# Patient Record
Sex: MALE | Race: WHITE | NOT HISPANIC OR LATINO | Employment: OTHER | URBAN - NONMETROPOLITAN AREA
[De-identification: names, ages, dates, MRNs, and addresses within clinical notes are randomized per-mention and may not be internally consistent; named-entity substitution may affect disease eponyms.]

---

## 2017-09-28 ENCOUNTER — OFFICE VISIT (OUTPATIENT)
Dept: CARDIOLOGY | Facility: CLINIC | Age: 72
End: 2017-09-28

## 2017-09-28 VITALS
OXYGEN SATURATION: 93 % | BODY MASS INDEX: 29.15 KG/M2 | HEART RATE: 73 BPM | SYSTOLIC BLOOD PRESSURE: 102 MMHG | DIASTOLIC BLOOD PRESSURE: 63 MMHG | WEIGHT: 203.6 LBS | HEIGHT: 70 IN

## 2017-09-28 DIAGNOSIS — R60.9 PERIPHERAL EDEMA: ICD-10-CM

## 2017-09-28 DIAGNOSIS — R06.02 SHORTNESS OF BREATH: ICD-10-CM

## 2017-09-28 DIAGNOSIS — I50.22 CHRONIC SYSTOLIC CONGESTIVE HEART FAILURE (HCC): Primary | ICD-10-CM

## 2017-09-28 DIAGNOSIS — E78.5 HYPERLIPIDEMIA, UNSPECIFIED HYPERLIPIDEMIA TYPE: ICD-10-CM

## 2017-09-28 PROCEDURE — 93000 ELECTROCARDIOGRAM COMPLETE: CPT | Performed by: NURSE PRACTITIONER

## 2017-09-28 PROCEDURE — 99213 OFFICE O/P EST LOW 20 MIN: CPT | Performed by: NURSE PRACTITIONER

## 2017-09-28 NOTE — PROGRESS NOTES
Subjective   Rao Alcazar is a 72 y.o. male     Chief Complaint   Patient presents with   • Congestive Heart Failure     patient presents for a 1 year follow up       HPI    Problem list:    1.  Dyslipidemia  2.  CHF  3.  History of cardiac arrest  4.  Cardiomegaly  5.  History of seizures  6.  Mental retardation  7.  Echo 4/19/16-EF 55-60%, diastolic dysfunction 1, one and half centimeter echodense mass superimposed on the superior aspect of the LAD seen only in the apical views.  Trace MR, recommend SERENA or cardiac MRI to further evaluate the LA findings.  7.1  SERENA 9/8/16-no evidence of intracardiac mass trivial MR and TR  8.  Stress test 4/19/1/6-low risk, no ischemia    Patient is a 72-year-old male who presents today for follow-up with his 2 caregivers at his side.  Questions are answered based on caregivers as patient responds yes to everything per the report.  He has no chest pain, pressure, palpitations, fluttering, dizziness, presyncope, syncope, orthopnea, PND or edema.  He does not complain of any shortness of breath with activity and had not noticed him labored in his activity.  His primary care doctor at [facility] which is Dr. Garcia monitors his blood work.      Current Outpatient Prescriptions   Medication Sig Dispense Refill   • acetaminophen (PAIN RELIEVER) 325 MG tablet Take 1-2 tablets by mouth every 4 (four) hours as needed.     • alendronate (FOSAMAX) 70 MG tablet Take 1 tablet by mouth 1 (one) time per week.     • aspirin 81 MG tablet Take 1 tablet by mouth daily.     • Calcium Carb-Cholecalciferol (CALCIUM 600 + D) 600-200 MG-UNIT tablet Take 1 tablet by mouth 2 (two) times a day.     • clonazePAM (KLONOPIN) 0.5 MG tablet Take 0.5 tablets by mouth 3 (three) times a day.     • levETIRAcetam (KEPPRA) 500 MG tablet Take 4 tablets by mouth 2 (two) times a day.     • levothyroxine (SYNTHROID) 25 MCG tablet Take 1 tablet by mouth daily.     • Multiple Vitamin (THERA) tablet Take 1 tablet by mouth  daily.     • phenytoin (DILANTIN) 100 MG ER capsule Take 2 capsules by mouth 2 (two) times a day.     • polyethylene glycol (MIRALAX) powder Take  by mouth daily. Mix 1 capful in 8 ounces of water and drink daily     • ranitidine (ZANTAC) 300 MG tablet Take 1 tablet by mouth 2 (two) times a day.     • Sennosides (SENNA) 8.6 MG capsule Take 1 capsule by mouth 2 (Two) Times a Day.     • simvastatin (ZOCOR) 20 MG tablet Take 1 tablet by mouth daily.     • tamsulosin (FLOMAX) 0.4 MG capsule 24 hr capsule Take 1 capsule by mouth daily.       No current facility-administered medications for this visit.        ALLERGIES    Review of patient's allergies indicates no known allergies.    Past Medical History:   Diagnosis Date   • Atypical chest pain    • Cardiomegaly    • CHF (congestive heart failure)    • History of panic attacks    • History of seizure disorder    • Mental disorder    • Mental retardation    • Panic attacks    • Seizures        Social History     Social History   • Marital status: Single     Spouse name: N/A   • Number of children: N/A   • Years of education: N/A     Occupational History   • Not on file.     Social History Main Topics   • Smoking status: Never Smoker   • Smokeless tobacco: Never Used   • Alcohol use No   • Drug use: No   • Sexual activity: Not on file     Other Topics Concern   • Not on file     Social History Narrative       Family History   Problem Relation Age of Onset   • Family history unknown: Yes       Review of Systems   Constitutional: Negative for diaphoresis and fatigue.   HENT: Negative for rhinorrhea and sneezing.    Eyes: Negative for visual disturbance.   Respiratory: Negative for chest tightness and shortness of breath.    Cardiovascular: Negative for chest pain, palpitations and leg swelling.   Gastrointestinal: Negative for nausea and vomiting.        Dysphagia    Endocrine: Negative.    Genitourinary: Positive for difficulty urinating (incontinent ).   Musculoskeletal:  "Positive for back pain. Negative for arthralgias and neck pain.   Skin: Negative.    Allergic/Immunologic: Negative for environmental allergies.   Neurological: Positive for seizures (H/O seizures, last 2-3 mos 15-20 sec only  ). Negative for dizziness, syncope and light-headedness.   Hematological: Negative.    Psychiatric/Behavioral: The patient is nervous/anxious.         Mentally retarded        Objective   /63 (BP Location: Left arm, Patient Position: Sitting)  Pulse 73  Ht 70\" (177.8 cm)  Wt 203 lb 9.6 oz (92.4 kg)  SpO2 93%  BMI 29.21 kg/m2  Vitals:    09/28/17 0812   BP: 102/63   BP Location: Left arm   Patient Position: Sitting   Pulse: 73   SpO2: 93%   Weight: 203 lb 9.6 oz (92.4 kg)   Height: 70\" (177.8 cm)      Lab Results (most recent)     None        Physical Exam   Constitutional: Vital signs are normal. He appears well-developed and well-nourished. He is active and cooperative.   HENT:   Head: Normocephalic.   Mouth/Throat: Abnormal dentition (edentulous ).   Eyes: Lids are normal.   Neck: Normal carotid pulses, no hepatojugular reflux and no JVD present. Carotid bruit is not present.   Cardiovascular: Normal rate, regular rhythm and normal heart sounds.    Pulses:       Radial pulses are 2+ on the right side, and 2+ on the left side.        Dorsalis pedis pulses are 2+ on the right side, and 2+ on the left side.        Posterior tibial pulses are 2+ on the right side, and 2+ on the left side.   Trace edema BLE   Pulmonary/Chest: Effort normal and breath sounds normal.   Abdominal: Normal appearance and bowel sounds are normal.   Neurological: He is alert.   Patient speaks very little unable to answer questions    Skin: Skin is warm, dry and intact.   Psychiatric: He is slowed.   Flat; not very social        Procedure     ECG 12 Lead  Date/Time: 9/28/2017 8:11 AM  Performed by: MICHELLE RODRIGEZ  Authorized by: MICHELLE RODRIGEZ   Comparison: compared with previous ECG from 3/16/2016  Similar " to previous ECG  Rhythm: sinus rhythm  Rate: normal  BPM: 70  T wave flattening noted on lead: nonspecific changes.  QRS axis: normal  Clinical impression: non-specific ECG  Comments: chf                   Assessment/Plan      Diagnosis Plan   1. Chronic systolic congestive heart failure  ECG 12 Lead   2. Hyperlipidemia, unspecified hyperlipidemia type     3. Shortness of breath     4. Peripheral edema  Compression Knee High Stockings       Return in about 1 year (around 9/28/2018).       CHF-patient doing well.  Hyperlipidemia-patient is on Zocor monitor by PCP.  Shortness of breath-no complaints.  Peripheral edema-ordered compression knee socks.  He will continue his medication regimen.  He will follow-up in one year or sooner if any changes.

## 2017-09-28 NOTE — PATIENT INSTRUCTIONS
Edema  Edema is an abnormal buildup of fluids in your body tissues. Edema is somewhat dependent on gravity to pull the fluid to the lowest place in your body. That makes the condition more common in the legs and thighs (lower extremities). Painless swelling of the feet and ankles is common and becomes more likely as you get older. It is also common in looser tissues, like around your eyes.   When the affected area is squeezed, the fluid may move out of that spot and leave a dent for a few moments. This dent is called pitting.   CAUSES   There are many possible causes of edema. Eating too much salt and being on your feet or sitting for a long time can cause edema in your legs and ankles. Hot weather may make edema worse. Common medical causes of edema include:  · Heart failure.  · Liver disease.  · Kidney disease.  · Weak blood vessels in your legs.  · Cancer.  · An injury.  · Pregnancy.  · Some medications.  · Obesity.   SYMPTOMS   Edema is usually painless. Your skin may look swollen or shiny.   DIAGNOSIS   Your health care provider may be able to diagnose edema by asking about your medical history and doing a physical exam. You may need to have tests such as X-rays, an electrocardiogram, or blood tests to check for medical conditions that may cause edema.   TREATMENT   Edema treatment depends on the cause. If you have heart, liver, or kidney disease, you need the treatment appropriate for these conditions. General treatment may include:  · Elevation of the affected body part above the level of your heart.  · Compression of the affected body part. Pressure from elastic bandages or support stockings squeezes the tissues and forces fluid back into the blood vessels. This keeps fluid from entering the tissues.  · Restriction of fluid and salt intake.  · Use of a water pill (diuretic). These medications are appropriate only for some types of edema. They pull fluid out of your body and make you urinate more often. This  gets rid of fluid and reduces swelling, but diuretics can have side effects. Only use diuretics as directed by your health care provider.  HOME CARE INSTRUCTIONS   · Keep the affected body part above the level of your heart when you are lying down.    · Do not sit still or stand for prolonged periods.    · Do not put anything directly under your knees when lying down.  · Do not wear constricting clothing or garters on your upper legs.    · Exercise your legs to work the fluid back into your blood vessels. This may help the swelling go down.    · Wear elastic bandages or support stockings to reduce ankle swelling as directed by your health care provider.    · Eat a low-salt diet to reduce fluid if your health care provider recommends it.    · Only take medicines as directed by your health care provider.   SEEK MEDICAL CARE IF:   · Your edema is not responding to treatment.  · You have heart, liver, or kidney disease and notice symptoms of edema.  · You have edema in your legs that does not improve after elevating them.    · You have sudden and unexplained weight gain.  SEEK IMMEDIATE MEDICAL CARE IF:   · You develop shortness of breath or chest pain.    · You cannot breathe when you lie down.  · You develop pain, redness, or warmth in the swollen areas.    · You have heart, liver, or kidney disease and suddenly get edema.  · You have a fever and your symptoms suddenly get worse.  MAKE SURE YOU:   · Understand these instructions.  · Will watch your condition.  · Will get help right away if you are not doing well or get worse.     This information is not intended to replace advice given to you by your health care provider. Make sure you discuss any questions you have with your health care provider.     Document Released: 12/18/2006 Document Revised: 04/10/2017 Document Reviewed: 10/10/2014  [employer] Interactive Patient Education ©2017 [employer]

## 2018-09-26 ENCOUNTER — OFFICE VISIT (OUTPATIENT)
Dept: CARDIOLOGY | Facility: CLINIC | Age: 73
End: 2018-09-26

## 2018-09-26 VITALS
BODY MASS INDEX: 27.83 KG/M2 | HEART RATE: 81 BPM | WEIGHT: 194.4 LBS | OXYGEN SATURATION: 94 % | HEIGHT: 70 IN | SYSTOLIC BLOOD PRESSURE: 102 MMHG | DIASTOLIC BLOOD PRESSURE: 63 MMHG

## 2018-09-26 DIAGNOSIS — I51.7 CARDIOMEGALY: ICD-10-CM

## 2018-09-26 DIAGNOSIS — R60.9 PERIPHERAL EDEMA: ICD-10-CM

## 2018-09-26 DIAGNOSIS — R06.02 SHORTNESS OF BREATH: ICD-10-CM

## 2018-09-26 DIAGNOSIS — E78.5 HYPERLIPIDEMIA, UNSPECIFIED HYPERLIPIDEMIA TYPE: Primary | ICD-10-CM

## 2018-09-26 PROCEDURE — 99213 OFFICE O/P EST LOW 20 MIN: CPT | Performed by: NURSE PRACTITIONER

## 2018-09-26 PROCEDURE — 93000 ELECTROCARDIOGRAM COMPLETE: CPT | Performed by: NURSE PRACTITIONER

## 2018-09-26 NOTE — PATIENT INSTRUCTIONS
Obesity, Adult  Obesity is the condition of having too much total body fat. Being overweight or obese means that your weight is greater than what is considered healthy for your body size. Obesity is determined by a measurement called BMI. BMI is an estimate of body fat and is calculated from height and weight. For adults, a BMI of 30 or higher is considered obese.  Obesity can eventually lead to other health concerns and major illnesses, including:  · Stroke.  · Coronary artery disease (CAD).  · Type 2 diabetes.  · Some types of cancer, including cancers of the colon, breast, uterus, and gallbladder.  · Osteoarthritis.  · High blood pressure (hypertension).  · High cholesterol.  · Sleep apnea.  · Gallbladder stones.  · Infertility problems.    What are the causes?  The main cause of obesity is taking in (consuming) more calories than your body uses for energy. Other factors that contribute to this condition may include:  · Being born with genes that make you more likely to become obese.  · Having a medical condition that causes obesity. These conditions include:  ? Hypothyroidism.  ? Polycystic ovarian syndrome (PCOS).  ? Binge-eating disorder.  ? Cushing syndrome.  · Taking certain medicines, such as steroids, antidepressants, and seizure medicines.  · Not being physically active (sedentary lifestyle).  · Living where there are limited places to exercise safely or buy healthy foods.  · Not getting enough sleep.    What increases the risk?  The following factors may increase your risk of this condition:  · Having a family history of obesity.  · Being a woman of -American descent.  · Being a man of  descent.    What are the signs or symptoms?  Having excessive body fat is the main symptom of this condition.  How is this diagnosed?  This condition may be diagnosed based on:  · Your symptoms.  · Your medical history.  · A physical exam. Your health care provider may measure:  ? Your BMI. If you are an  adult with a BMI between 25 and less than 30, you are considered overweight. If you are an adult with a BMI of 30 or higher, you are considered obese.  ? The distances around your hips and your waist (circumferences). These may be compared to each other to help diagnose your condition.  ? Your skinfold thickness. Your health care provider may gently pinch a fold of your skin and measure it.    How is this treated?  Treatment for this condition often includes changing your lifestyle. Treatment may include some or all of the following:  · Dietary changes. Work with your health care provider and a dietitian to set a weight-loss goal that is healthy and reasonable for you. Dietary changes may include eating:  ? Smaller portions. A portion size is the amount of a particular food that is healthy for you to eat at one time. This varies from person to person.  ? Low-calorie or low-fat options.  ? More whole grains, fruits, and vegetables.  · Regular physical activity. This may include aerobic activity (cardio) and strength training.  · Medicine to help you lose weight. Your health care provider may prescribe medicine if you are unable to lose 1 pound a week after 6 weeks of eating more healthily and doing more physical activity.  · Surgery. Surgical options may include gastric banding and gastric bypass. Surgery may be done if:  ? Other treatments have not helped to improve your condition.  ? You have a BMI of 40 or higher.  ? You have life-threatening health problems related to obesity.    Follow these instructions at home:    Eating and drinking    · Follow recommendations from your health care provider about what you eat and drink. Your health care provider may advise you to:  ? Limit fast foods, sweets, and processed snack foods.  ? Choose low-fat options, such as low-fat milk instead of whole milk.  ? Eat 5 or more servings of fruits or vegetables every day.  ? Eat at home more often. This gives you more control over  what you eat.  ? Choose healthy foods when you eat out.  ? Learn what a healthy portion size is.  ? Keep low-fat snacks on hand.  ? Avoid sugary drinks, such as soda, fruit juice, iced tea sweetened with sugar, and flavored milk.  ? Eat a healthy breakfast.  · Drink enough water to keep your urine clear or pale yellow.  · Do not go without eating for long periods of time (do not fast) or follow a fad diet. Fasting and fad diets can be unhealthy and even dangerous.  Physical Activity  · Exercise regularly, as told by your health care provider. Ask your health care provider what types of exercise are safe for you and how often you should exercise.  · Warm up and stretch before being active.  · Cool down and stretch after being active.  · Rest between periods of activity.  Lifestyle  · Limit the time that you spend in front of your TV, computer, or video game system.  · Find ways to reward yourself that do not involve food.  · Limit alcohol intake to no more than 1 drink a day for nonpregnant women and 2 drinks a day for men. One drink equals 12 oz of beer, 5 oz of wine, or 1½ oz of hard liquor.  General instructions  · Keep a weight loss journal to keep track of the food you eat and how much you exercise you get.  · Take over-the-counter and prescription medicines only as told by your health care provider.  · Take vitamins and supplements only as told by your health care provider.  · Consider joining a support group. Your health care provider may be able to recommend a support group.  · Keep all follow-up visits as told by your health care provider. This is important.  Contact a health care provider if:  · You are unable to meet your weight loss goal after 6 weeks of dietary and lifestyle changes.  This information is not intended to replace advice given to you by your health care provider. Make sure you discuss any questions you have with your health care provider.  Document Released: 01/25/2006 Document Revised:  05/22/2017 Document Reviewed: 10/05/2016  [employer] Interactive Patient Education © 2018 [employer]  MyPlate from [employer]  The general, healthful diet is based on the 2010 Dietary Guidelines for Americans. The amount of food you need to eat from each food group depends on your age, sex, and level of physical activity and can be individualized by a dietitian. Go to ChooseMyPlate.gov for more information.  What do I need to know about the MyPlate plan?  · Enjoy your food, but eat less.  · Avoid oversized portions.  ? ½ of your plate should include fruits and vegetables.  ? ¼ of your plate should be grains.  ? ¼ of your plate should be protein.  Grains  · Make at least half of your grains whole grains.  · For a 2,000 calorie daily food plan, eat 6 oz every day.  · 1 oz is about 1 slice bread, 1 cup cereal, or ½ cup cooked rice, cereal, or pasta.  Vegetables  · Make half your plate fruits and vegetables.  · For a 2,000 calorie daily food plan, eat 2½ cups every day.  · 1 cup is about 1 cup raw or cooked vegetables or vegetable juice or 2 cups raw leafy greens.  Fruits  · Make half your plate fruits and vegetables.  · For a 2,000 calorie daily food plan, eat 2 cups every day.  · 1 cup is about 1 cup fruit or 100% fruit juice or ½ cup dried fruit.  Protein  · For a 2,000 calorie daily food plan, eat 5½ oz every day.  · 1 oz is about 1 oz meat, poultry, or fish, ¼ cup cooked beans, 1 egg, 1 Tbsp peanut butter, or ½ oz nuts or seeds.  Dairy  · Switch to fat-free or low-fat (1%) milk.  · For a 2,000 calorie daily food plan, eat 3 cups every day.  · 1 cup is about 1 cup milk or yogurt or soy milk (soy beverage), 1½ oz natural cheese, or 2 oz processed cheese.  Fats, Oils, and Empty Calories  · Only small amounts of oils are recommended.  · Empty calories are calories from solid fats or added sugars.  · Compare sodium in foods like soup, bread, and frozen meals. Choose the foods with lower numbers.  · Drink water instead of  sugary drinks.  What foods can I eat?  Grains  Whole grains such as whole wheat, quinoa, millet, and bulgur. Bread, rolls, and pasta made from whole grains. Brown or wild rice. Hot or cold cereals made from whole grains and without added sugar.  Vegetables  All fresh vegetables, especially fresh red, dark green, or orange vegetables. Peas and beans. Low-sodium frozen or canned vegetables prepared without added salt. Low-sodium vegetable juices.  Fruits  All fresh, frozen, and dried fruits. Canned fruit packed in water or fruit juice without added sugar. Fruit juices without added sugar.  Meats and Other Protein Sources  Boiled, baked, or grilled lean meat trimmed of fat. Skinless poultry. Fresh seafood and shellfish. Canned seafood packed in water. Unsalted nuts and unsalted nut butters. Tofu. Dried beans and pea. Eggs.  Dairy  Low-fat or fat-free milk, yogurt, and cheeses.  Sweets and Desserts  Frozen desserts made from low-fat milk.  Fats and Oils  Olive, peanut, and canola oils and margarine. Salad dressing and mayonnaise made from these oils.  Other  Soups and casseroles made from allowed ingredients and without added fat or salt.  The items listed above may not be a complete list of recommended foods or beverages. Contact your dietitian for more options.  What foods are not recommended?  Grains  Sweetened, low-fiber cereals. Packaged baked goods. Snack crackers and chips. Cheese crackers, butter crackers, and biscuits. Frozen waffles, sweet breads, doughnuts, pastries, packaged baking mixes, pancakes, cakes, and cookies.  Vegetables  Regular canned or frozen vegetables or vegetables prepared with salt. Canned tomatoes. Canned tomato sauce. Fried vegetables. Vegetables in cream sauce or cheese sauce.  Fruits  Fruits packed in syrup or made with added sugar.  Meats and Other Protein Sources  Marbled or fatty meats such as ribs. Poultry with skin. Fried meats, poultry, eggs, or fish. Sausages, hot dogs, and deli  meats such as pastrami, bologna, or salami.  Dairy  Whole milk, cream, cheeses made from whole milk, sour cream. Ice cream or yogurt made from whole milk or with added sugar.  Beverages  For adults, no more than one alcoholic drink per day. Regular soft drinks or other sugary beverages. Juice drinks.  Sweets and Desserts  Sugary or fatty desserts, candy, and other sweets.  Fats and Oils  Solid shortening or partially hydrogenated oils. Solid margarine. Margarine that contains trans fats. Butter.  The items listed above may not be a complete list of foods and beverages to avoid. Contact your dietitian for more information.  This information is not intended to replace advice given to you by your health care provider. Make sure you discuss any questions you have with your health care provider.  Document Released: 01/06/2009 Document Revised: 05/25/2017 Document Reviewed: 11/26/2014  [employer] Interactive Patient Education © 2018 [employer]
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
## 2019-09-26 ENCOUNTER — OFFICE VISIT (OUTPATIENT)
Dept: CARDIOLOGY | Facility: CLINIC | Age: 74
End: 2019-09-26

## 2019-09-26 VITALS
BODY MASS INDEX: 29.58 KG/M2 | WEIGHT: 195.2 LBS | HEART RATE: 77 BPM | HEIGHT: 68 IN | DIASTOLIC BLOOD PRESSURE: 78 MMHG | SYSTOLIC BLOOD PRESSURE: 130 MMHG | OXYGEN SATURATION: 95 %

## 2019-09-26 DIAGNOSIS — R06.02 SHORTNESS OF BREATH: ICD-10-CM

## 2019-09-26 DIAGNOSIS — I51.7 CARDIOMEGALY: Primary | ICD-10-CM

## 2019-09-26 DIAGNOSIS — E78.5 HYPERLIPIDEMIA, UNSPECIFIED HYPERLIPIDEMIA TYPE: ICD-10-CM

## 2019-09-26 DIAGNOSIS — R60.9 PERIPHERAL EDEMA: ICD-10-CM

## 2019-09-26 PROCEDURE — 93000 ELECTROCARDIOGRAM COMPLETE: CPT | Performed by: NURSE PRACTITIONER

## 2019-09-26 PROCEDURE — 99213 OFFICE O/P EST LOW 20 MIN: CPT | Performed by: NURSE PRACTITIONER

## 2019-09-26 RX ORDER — LOPERAMIDE HYDROCHLORIDE 2 MG/1
2 CAPSULE ORAL 4 TIMES DAILY PRN
Status: ON HOLD | COMMUNITY
End: 2021-09-24

## 2019-09-26 RX ORDER — PETROLATUM 42 G/100G
OINTMENT TOPICAL EVERY 12 HOURS SCHEDULED
Status: ON HOLD | COMMUNITY
End: 2021-09-24

## 2019-09-26 RX ORDER — AMOXICILLIN 250 MG
1 CAPSULE ORAL DAILY
Status: ON HOLD | COMMUNITY
End: 2021-09-24

## 2019-09-26 NOTE — PATIENT INSTRUCTIONS
"Fat and Cholesterol Restricted Eating Plan  Getting too much fat and cholesterol in your diet may cause health problems. Choosing the right foods helps keep your fat and cholesterol at normal levels. This can keep you from getting certain diseases.  Your doctor may recommend an eating plan that includes:  · Total fat: ______% or less of total calories a day.  · Saturated fat: ______% or less of total calories a day.  · Cholesterol: less than _________mg a day.  · Fiber: ______g a day.  What are tips for following this plan?  General tips    · Work with your doctor to lose weight if you need to.  · Avoid:  ? Foods with added sugar.  ? Fried foods.  ? Foods with partially hydrogenated oils.  · Limit alcohol intake to no more than 1 drink a day for nonpregnant women and 2 drinks a day for men. One drink equals 12 oz of beer, 5 oz of wine, or 1½ oz of hard liquor.  Reading food labels  · Check food labels for:  ? Trans fats.  ? Partially hydrogenated oils.  ? Saturated fat (g) in each serving.  ? Cholesterol (mg) in each serving.  ? Fiber (g) in each serving.  · Choose foods with healthy fats, such as:  ? Monounsaturated fats.  ? Polyunsaturated fats.  ? Omega-3 fats.  · Choose grain products that have whole grains. Look for the word \"whole\" as the first word in the ingredient list.  Cooking  · Cook foods using low-fat methods. These include baking, boiling, grilling, and broiling.  · Eat more home-cooked foods. Eat at restaurants and buffets less often.  · Avoid cooking using saturated fats, such as butter, cream, palm oil, palm kernel oil, and coconut oil.  Meal planning    · At meals, divide your plate into four equal parts:  ? Fill one-half of your plate with vegetables and green salads.  ? Fill one-fourth of your plate with whole grains.  ? Fill one-fourth of your plate with low-fat (lean) protein foods.  · Eat fish that is high in omega-3 fats at least two times a week. This includes mackerel, tuna, sardines, and " salmon.  · Eat foods that are high in fiber, such as whole grains, beans, apples, broccoli, carrots, peas, and barley.  Recommended foods  Grains  · Whole grains, such as whole wheat or whole grain breads, crackers, cereals, and pasta. Unsweetened oatmeal, bulgur, barley, quinoa, or brown rice. Corn or whole wheat flour tortillas.  Vegetables  · Fresh or frozen vegetables (raw, steamed, roasted, or grilled). Green salads.  Fruits  · All fresh, canned (in natural juice), or frozen fruits.  Meats and other protein foods  · Ground beef (85% or leaner), grass-fed beef, or beef trimmed of fat. Skinless chicken or turkey. Ground chicken or turkey. Pork trimmed of fat. All fish and seafood. Egg whites. Dried beans, peas, or lentils. Unsalted nuts or seeds. Unsalted canned beans. Nut butters without added sugar or oil.  Dairy  · Low-fat or nonfat dairy products, such as skim or 1% milk, 2% or reduced-fat cheeses, low-fat and fat-free ricotta or cottage cheese, or plain low-fat and nonfat yogurt.  Fats and oils  · Tub margarine without trans fats. Light or reduced-fat mayonnaise and salad dressings. Avocado. Olive, canola, sesame, or safflower oils.  The items listed above may not be a complete list of recommended foods or beverages. Contact your dietitian for more options.  The items listed above may not be a complete list of foods and beverages [you/your child] can eat. Contact a dietitian for more information.  Foods to avoid  Grains  · White bread. White pasta. White rice. Cornbread. Bagels, pastries, and croissants. Crackers and snack foods that contain trans fat and hydrogenated oils.  Vegetables  · Vegetables cooked in cheese, cream, or butter sauce. Fried vegetables.  Fruits  · Canned fruit in heavy syrup. Fruit in cream or butter sauce. Fried fruit.  Meats and other protein foods  · Fatty cuts of meat. Ribs, chicken wings, alford, sausage, bologna, salami, chitterlings, fatback, hot dogs, bratwurst, and packaged  lunch meats. Liver and organ meats. Whole eggs and egg yolks. Chicken and turkey with skin. Fried meat.  Dairy  · Whole or 2% milk, cream, half-and-half, and cream cheese. Whole milk cheeses. Whole-fat or sweetened yogurt. Full-fat cheeses. Nondairy creamers and whipped toppings. Processed cheese, cheese spreads, and cheese curds.  Beverages  · Alcohol. Sugar-sweetened drinks such as sodas, lemonade, and fruit drinks.  Fats and oils  · Butter, stick margarine, lard, shortening, ghee, or alford fat. Coconut, palm kernel, and palm oils.  Sweets and desserts  · Corn syrup, sugars, honey, and molasses. Candy. Jam and jelly. Syrup. Sweetened cereals. Cookies, pies, cakes, donuts, muffins, and ice cream.  The items listed above may not be a complete list of foods and beverages to avoid. Contact your dietitian for more information.  The items listed above may not be a complete list of foods and beverages [you/your child] should avoid. Contact a dietitian for more information.  Summary  · Choosing the right foods helps keep your fat and cholesterol at normal levels. This can keep you from getting certain diseases.  · At meals, fill one-half of your plate with vegetables and green salads.  · Eat high-fiber foods, like whole grains, beans, apples, carrots, peas, and barley.  · Limit added sugar, saturated fats, alcohol, and fried foods.  This information is not intended to replace advice given to you by your health care provider. Make sure you discuss any questions you have with your health care provider.  Document Released: 06/18/2013 Document Revised: 09/04/2018 Document Reviewed: 09/04/2018  [employer] Interactive Patient Education © 2019 [employer]  BMI for Adults    Body mass index (BMI) is a number that is calculated from a person's weight and height. BMI may help to estimate how much of a person's weight is composed of fat. BMI can help identify those who may be at higher risk for certain medical problems.  How is BMI  "used with adults?  BMI is used as a screening tool to identify possible weight problems. It is used to check whether a person is obese, overweight, healthy weight, or underweight.  How is BMI calculated?  BMI measures your weight and compares it to your height. This can be done either in English ([place] or metric measurements. Note that charts are available to help you find your BMI quickly and easily without having to do these calculations yourself.  To calculate your BMI in English ([place] measurements, your health care provider will:  1. Measure your weight in pounds (lb).  2. Multiply the number of pounds by 703.  ? For example, for a person who weighs 180 lb, multiply that number by 703, which equals [phone],540.  3. Measure your height in inches (in). Then multiply that number by itself to get a measurement called \"inches squared.\"  ? For example, for a person who is 70 in tall, the \"inches squared\" measurement is 70 in x 70 in, which equals 4900 inches squared.  4. Divide the total from Step 2 (number of lb x 703) by the total from Step 3 (inches squared): 126,540 ÷ 4900 = 25.8. This is your BMI.  To calculate your BMI in metric measurements, your health care provider will:  1. Measure your weight in kilograms (kg).  2. Measure your height in meters (m). Then multiply that number by itself to get a measurement called \"meters squared.\"  ? For example, for a person who is 1.75 m tall, the \"meters squared\" measurement is 1.75 m x 1.75 m, which is equal to 3.1 meters squared.  3. Divide the number of kilograms (your weight) by the meters squared number. In this example: 70 ÷ 3.1 = 22.6. This is your BMI.  How is BMI interpreted?  To interpret your results, your health care provider will use BMI charts to identify whether you are underweight, normal weight, overweight, or obese. The following guidelines will be used:  · Underweight: BMI less than 18.5.  · Normal weight: BMI between 18.5 and 24.9.  · Overweight: BMI " between 25 and 29.9.  · Obese: BMI of 30 and above.  Please note:  · Weight includes both fat and muscle, so someone with a muscular build, such as an athlete, may have a BMI that is higher than 24.9. In cases like these, BMI is not an accurate measure of body fat.  · To determine if excess body fat is the cause of a BMI of 25 or higher, further assessments may need to be done by a health care provider.  · BMI is usually interpreted in the same way for men and women.  Why is BMI a useful tool?  BMI is useful in two ways:  · Identifying a weight problem that may be related to a medical condition, or that may increase the risk for medical problems.  · Promoting lifestyle and diet changes in order to reach a healthy weight.  Summary  · Body mass index (BMI) is a number that is calculated from a person's weight and height.  · BMI may help to estimate how much of a person's weight is composed of fat. BMI can help identify those who may be at higher risk for certain medical problems.  · BMI can be measured using English measurements or metric measurements.  · To interpret your results, your health care provider will use BMI charts to identify whether you are underweight, normal weight, overweight, or obese.  This information is not intended to replace advice given to you by your health care provider. Make sure you discuss any questions you have with your health care provider.  Document Released: 08/29/2005 Document Revised: 10/31/2018 Document Reviewed: 10/31/2018  [employer] Interactive Patient Education © 2019 [employer]

## 2019-09-26 NOTE — PROGRESS NOTES
Subjective   Rao Alcazar is a 74 y.o. male     Chief Complaint   Patient presents with   • Follow-up     1yr   • Congestive Heart Failure       HPI    Problem list:    1.  Dyslipidemia  2.  CHF  3.  History of cardiac arrest  4.  Cardiomegaly  5.  History of seizures  6.  Mental retardation  7.  Echo 4/19/16-EF 55-60%, diastolic dysfunction 1, one and half centimeter echodense mass superimposed on the superior aspect of the LAD seen only in the apical views.  Trace MR, recommend SERENA or cardiac MRI to further evaluate the LA findings.  7.1  SERENA 9/8/16-no evidence of intracardiac mass trivial MR and TR  8.  Stress test 4/19/1/6-low risk, no ischemia    Patient is a 74-year-old male who presents today for follow-up with caregiver at his side.  Questions are answered by caregiver.  She denies that he said any chest pain, pressure, palpitations, fluttering, dizziness, presyncope, syncope, orthopnea or PND.  He did go to using the compression socks as needed due to him getting a rash.  Caregiver says the swelling is actually been doing very well.  She denies any has any shortness of breath with activity.  Patient was just pulling a piece of gum or pudding.    Current Outpatient Medications   Medication Sig Dispense Refill   • acetaminophen (PAIN RELIEVER) 325 MG tablet Take 1-2 tablets by mouth every 4 (four) hours as needed.     • aspirin 81 MG tablet Take 1 tablet by mouth daily.     • Calcium Carb-Cholecalciferol (CALCIUM 600 + D) 600-200 MG-UNIT tablet Take 1 tablet by mouth 2 (two) times a day.     • clonazePAM (KLONOPIN) 0.5 MG tablet Take 0.5 tablets by mouth 3 (three) times a day.     • hydrophor (AQUAPHOR) ointment ointment Apply  topically to the appropriate area as directed Every 12 (Twelve) Hours.     • levETIRAcetam (KEPPRA) 500 MG tablet Take 4 tablets by mouth 2 (two) times a day.     • levothyroxine (SYNTHROID) 25 MCG tablet Take 1 tablet by mouth daily.     • loperamide (IMODIUM) 2 MG capsule Take 2 mg  by mouth 4 (Four) Times a Day As Needed for Diarrhea.     • Menthol-Zinc Oxide (CALMOSEPTINE EX) Apply  topically.     • mupirocin (BACTROBAN) 2 % ointment Apply  topically to the appropriate area as directed 3 (Three) Times a Day.     • phenytoin extended (DILANTIN) 200 MG ER capsule Take 200 mg by mouth 2 (Two) Times a Day.     • Polyethylene Glycol 3350 (MIRALAX PO) Take  by mouth As Needed.     • ranitidine (ZANTAC) 300 MG tablet Take 1 tablet by mouth 2 (two) times a day.     • senna-docusate (SENOKOT S) 8.6-50 MG per tablet Take 1 tablet by mouth Daily.     • simvastatin (ZOCOR) 20 MG tablet Take 1 tablet by mouth daily.     • tamsulosin (FLOMAX) 0.4 MG capsule 24 hr capsule Take 1 capsule by mouth daily.       No current facility-administered medications for this visit.        ALLERGIES    Patient has no known allergies.    Past Medical History:   Diagnosis Date   • Atypical chest pain    • Cardiomegaly    • CHF (congestive heart failure) (CMS/HCC)    • History of panic attacks    • History of seizure disorder    • Mental disorder    • Mental retardation    • Panic attacks    • Seizures (CMS/HCC)        Social History     Socioeconomic History   • Marital status: Single     Spouse name: Not on file   • Number of children: Not on file   • Years of education: Not on file   • Highest education level: Not on file   Tobacco Use   • Smoking status: Never Smoker   • Smokeless tobacco: Never Used   Substance and Sexual Activity   • Alcohol use: No   • Drug use: No       Family History   Family history unknown: Yes       Review of Systems   Constitutional: Negative for diaphoresis and fatigue.   HENT: Negative for congestion, rhinorrhea and sore throat.    Eyes: Negative for visual disturbance.   Respiratory: Negative for chest tightness and shortness of breath.    Cardiovascular: Positive for leg swelling (uses compression socks PRN). Negative for chest pain (Denies CP ) and palpitations.   Gastrointestinal: Positive  "for constipation. Negative for abdominal pain, blood in stool, diarrhea, nausea and vomiting.   Endocrine: Negative for cold intolerance and heat intolerance.   Genitourinary: Negative for difficulty urinating, dysuria, frequency, hematuria and urgency.   Musculoskeletal: Negative for arthralgias, back pain and neck pain.   Skin: Negative for rash and wound.   Allergic/Immunologic: Negative for environmental allergies and food allergies.   Neurological: Positive for seizures (occasionally, usually once/month, last about 15-20 sec typically ). Negative for dizziness, syncope, weakness, light-headedness, numbness and headaches.   Hematological: Does not bruise/bleed easily.   Psychiatric/Behavioral: Negative for sleep disturbance. The patient is nervous/anxious (panic attacks ).        Objective   /78   Pulse 77   Ht 172.7 cm (68\") Comment: caregiver stated  Wt 88.5 kg (195 lb 3.2 oz)   SpO2 95%   BMI 29.68 kg/m²   Vitals:    09/26/19 0827   BP: 130/78   Pulse: 77   SpO2: 95%   Weight: 88.5 kg (195 lb 3.2 oz)   Height: 172.7 cm (68\")      Lab Results (most recent)     None        Physical Exam   Constitutional: Vital signs are normal. He appears well-developed and well-nourished. He is active and cooperative.   HENT:   Head: Normocephalic.   Mouth/Throat: Abnormal dentition (edentulous).   Eyes: Lids are normal.   Neck: Normal carotid pulses, no hepatojugular reflux and no JVD present. Carotid bruit is not present.   Cardiovascular: Normal rate, regular rhythm and normal heart sounds.   Pulses:       Radial pulses are 2+ on the right side, and 2+ on the left side.        Dorsalis pedis pulses are 2+ on the right side, and 2+ on the left side.        Posterior tibial pulses are 2+ on the right side, and 2+ on the left side.   Trace edema BLE.   Pulmonary/Chest: Effort normal and breath sounds normal.   Abdominal: Normal appearance and bowel sounds are normal.   Neurological: He is alert.   Skin: Skin is " warm, dry and intact.   Psychiatric: He has a normal mood and affect.   Mental retardation; repetitive speech       Procedure     ECG 12 Lead  Date/Time: 9/26/2019 8:58 AM  Performed by: Sharron Ugarte APRN  Authorized by: Sharron Ugarte APRN   Comparison: compared with previous ECG from 9/26/2018  Similar to previous ECG  Rhythm: sinus rhythm  Rate: normal  BPM: 73  QRS axis: normal  Other findings: T wave abnormality    Clinical impression: non-specific ECG                 Assessment/Plan      Diagnosis Plan   1. Cardiomegaly  ECG 12 Lead   2. Hyperlipidemia, unspecified hyperlipidemia type     3. Shortness of breath     4. Peripheral edema         Return in about 1 year (around 9/26/2020).         Cardiomegaly-patient is doing well.  Hyperlipidemia-patient is on simvastatin.  Shortness of breath-resolved.  Peripheral edema-patient uses compression socks as needed.        Patient's Body mass index is 29.68 kg/m². BMI is above normal parameters. Recommendations include: educational material.      Electronically signed by:

## 2021-09-24 ENCOUNTER — APPOINTMENT (OUTPATIENT)
Dept: GENERAL RADIOLOGY | Facility: HOSPITAL | Age: 76
End: 2021-09-24

## 2021-09-24 ENCOUNTER — APPOINTMENT (OUTPATIENT)
Dept: CT IMAGING | Facility: HOSPITAL | Age: 76
End: 2021-09-24

## 2021-09-24 ENCOUNTER — HOSPITAL ENCOUNTER (INPATIENT)
Facility: HOSPITAL | Age: 76
LOS: 25 days | Discharge: HOSPICE | End: 2021-10-19
Attending: EMERGENCY MEDICINE | Admitting: INTERNAL MEDICINE

## 2021-09-24 DIAGNOSIS — R13.12 OROPHARYNGEAL DYSPHAGIA: ICD-10-CM

## 2021-09-24 DIAGNOSIS — J18.9 PNEUMONIA OF BOTH LOWER LOBES DUE TO INFECTIOUS ORGANISM: Primary | ICD-10-CM

## 2021-09-24 DIAGNOSIS — J96.21 ACUTE ON CHRONIC RESPIRATORY FAILURE WITH HYPOXIA (HCC): ICD-10-CM

## 2021-09-24 PROBLEM — I50.32 CHRONIC DIASTOLIC CHF (CONGESTIVE HEART FAILURE) (HCC): Status: ACTIVE | Noted: 2021-09-24

## 2021-09-24 LAB
ALBUMIN SERPL-MCNC: 3.7 G/DL (ref 3.5–5.2)
ALBUMIN/GLOB SERPL: 0.9 G/DL
ALP SERPL-CCNC: 181 U/L (ref 39–117)
ALT SERPL W P-5'-P-CCNC: 7 U/L (ref 1–41)
ANION GAP SERPL CALCULATED.3IONS-SCNC: 10 MMOL/L (ref 5–15)
AST SERPL-CCNC: 10 U/L (ref 1–40)
BASOPHILS # BLD AUTO: 0.03 10*3/MM3 (ref 0–0.2)
BASOPHILS NFR BLD AUTO: 0.2 % (ref 0–1.5)
BILIRUB SERPL-MCNC: 0.3 MG/DL (ref 0–1.2)
BILIRUB UR QL STRIP: NEGATIVE
BUN SERPL-MCNC: 16 MG/DL (ref 8–23)
BUN/CREAT SERPL: 17.2 (ref 7–25)
CALCIUM SPEC-SCNC: 9 MG/DL (ref 8.6–10.5)
CHLORIDE SERPL-SCNC: 99 MMOL/L (ref 98–107)
CLARITY UR: CLEAR
CO2 SERPL-SCNC: 29 MMOL/L (ref 22–29)
COLOR UR: YELLOW
CREAT SERPL-MCNC: 0.93 MG/DL (ref 0.76–1.27)
D-LACTATE SERPL-SCNC: 1.9 MMOL/L (ref 0.5–2)
DEPRECATED RDW RBC AUTO: 46.9 FL (ref 37–54)
EOSINOPHIL # BLD AUTO: 0.01 10*3/MM3 (ref 0–0.4)
EOSINOPHIL NFR BLD AUTO: 0.1 % (ref 0.3–6.2)
ERYTHROCYTE [DISTWIDTH] IN BLOOD BY AUTOMATED COUNT: 13.2 % (ref 12.3–15.4)
FLUAV RNA RESP QL NAA+PROBE: NOT DETECTED
FLUBV RNA RESP QL NAA+PROBE: NOT DETECTED
GFR SERPL CREATININE-BSD FRML MDRD: 79 ML/MIN/1.73
GLOBULIN UR ELPH-MCNC: 4.3 GM/DL
GLUCOSE SERPL-MCNC: 162 MG/DL (ref 65–99)
GLUCOSE UR STRIP-MCNC: NEGATIVE MG/DL
HCT VFR BLD AUTO: 47.2 % (ref 37.5–51)
HGB BLD-MCNC: 15.6 G/DL (ref 13–17.7)
HGB UR QL STRIP.AUTO: NEGATIVE
HOLD SPECIMEN: NORMAL
IMM GRANULOCYTES # BLD AUTO: 0.08 10*3/MM3 (ref 0–0.05)
IMM GRANULOCYTES NFR BLD AUTO: 0.4 % (ref 0–0.5)
KETONES UR QL STRIP: NEGATIVE
LEUKOCYTE ESTERASE UR QL STRIP.AUTO: NEGATIVE
LYMPHOCYTES # BLD AUTO: 1.88 10*3/MM3 (ref 0.7–3.1)
LYMPHOCYTES NFR BLD AUTO: 10.3 % (ref 19.6–45.3)
MAGNESIUM SERPL-MCNC: 1.9 MG/DL (ref 1.6–2.4)
MCH RBC QN AUTO: 31.5 PG (ref 26.6–33)
MCHC RBC AUTO-ENTMCNC: 33.1 G/DL (ref 31.5–35.7)
MCV RBC AUTO: 95.4 FL (ref 79–97)
MONOCYTES # BLD AUTO: 1.09 10*3/MM3 (ref 0.1–0.9)
MONOCYTES NFR BLD AUTO: 6 % (ref 5–12)
NEUTROPHILS NFR BLD AUTO: 15.15 10*3/MM3 (ref 1.7–7)
NEUTROPHILS NFR BLD AUTO: 83 % (ref 42.7–76)
NITRITE UR QL STRIP: NEGATIVE
NRBC BLD AUTO-RTO: 0 /100 WBC (ref 0–0.2)
NT-PROBNP SERPL-MCNC: 197.8 PG/ML (ref 0–900)
PH UR STRIP.AUTO: 7.5 [PH] (ref 5–8)
PLATELET # BLD AUTO: 260 10*3/MM3 (ref 140–450)
PMV BLD AUTO: 9.4 FL (ref 6–12)
POTASSIUM SERPL-SCNC: 4.6 MMOL/L (ref 3.5–5.2)
PROCALCITONIN SERPL-MCNC: 0.41 NG/ML (ref 0–0.25)
PROT SERPL-MCNC: 8 G/DL (ref 6–8.5)
PROT UR QL STRIP: NEGATIVE
QT INTERVAL: 362 MS
QTC INTERVAL: 433 MS
RBC # BLD AUTO: 4.95 10*6/MM3 (ref 4.14–5.8)
SARS-COV-2 RNA RESP QL NAA+PROBE: NOT DETECTED
SODIUM SERPL-SCNC: 138 MMOL/L (ref 136–145)
SP GR UR STRIP: 1.02 (ref 1–1.03)
TROPONIN T SERPL-MCNC: <0.01 NG/ML (ref 0–0.03)
UROBILINOGEN UR QL STRIP: NORMAL
WBC # BLD AUTO: 18.24 10*3/MM3 (ref 3.4–10.8)
WHOLE BLOOD HOLD SPECIMEN: NORMAL
WHOLE BLOOD HOLD SPECIMEN: NORMAL

## 2021-09-24 PROCEDURE — 99284 EMERGENCY DEPT VISIT MOD MDM: CPT

## 2021-09-24 PROCEDURE — 71250 CT THORAX DX C-: CPT

## 2021-09-24 PROCEDURE — 74176 CT ABD & PELVIS W/O CONTRAST: CPT

## 2021-09-24 PROCEDURE — 25010000002 VANCOMYCIN 10 G RECONSTITUTED SOLUTION: Performed by: EMERGENCY MEDICINE

## 2021-09-24 PROCEDURE — 93005 ELECTROCARDIOGRAM TRACING: CPT | Performed by: EMERGENCY MEDICINE

## 2021-09-24 PROCEDURE — 83735 ASSAY OF MAGNESIUM: CPT | Performed by: EMERGENCY MEDICINE

## 2021-09-24 PROCEDURE — 83605 ASSAY OF LACTIC ACID: CPT | Performed by: EMERGENCY MEDICINE

## 2021-09-24 PROCEDURE — 25010000002 FOSPHENYTOIN 100 MG PE/2ML SOLUTION 2 ML VIAL

## 2021-09-24 PROCEDURE — 84145 PROCALCITONIN (PCT): CPT | Performed by: EMERGENCY MEDICINE

## 2021-09-24 PROCEDURE — 87040 BLOOD CULTURE FOR BACTERIA: CPT | Performed by: EMERGENCY MEDICINE

## 2021-09-24 PROCEDURE — 25010000002 LEVETRIRACETAM PER 10 MG

## 2021-09-24 PROCEDURE — P9612 CATHETERIZE FOR URINE SPEC: HCPCS

## 2021-09-24 PROCEDURE — 81003 URINALYSIS AUTO W/O SCOPE: CPT | Performed by: EMERGENCY MEDICINE

## 2021-09-24 PROCEDURE — 71045 X-RAY EXAM CHEST 1 VIEW: CPT

## 2021-09-24 PROCEDURE — 25010000002 PIPERACILLIN SOD-TAZOBACTAM PER 1 G: Performed by: NURSE PRACTITIONER

## 2021-09-24 PROCEDURE — 84484 ASSAY OF TROPONIN QUANT: CPT | Performed by: EMERGENCY MEDICINE

## 2021-09-24 PROCEDURE — 80053 COMPREHEN METABOLIC PANEL: CPT | Performed by: EMERGENCY MEDICINE

## 2021-09-24 PROCEDURE — 25010000002 HEPARIN (PORCINE) PER 1000 UNITS: Performed by: NURSE PRACTITIONER

## 2021-09-24 PROCEDURE — 87636 SARSCOV2 & INF A&B AMP PRB: CPT | Performed by: EMERGENCY MEDICINE

## 2021-09-24 PROCEDURE — 83880 ASSAY OF NATRIURETIC PEPTIDE: CPT | Performed by: EMERGENCY MEDICINE

## 2021-09-24 PROCEDURE — 99223 1ST HOSP IP/OBS HIGH 75: CPT | Performed by: HOSPITALIST

## 2021-09-24 PROCEDURE — 85025 COMPLETE CBC W/AUTO DIFF WBC: CPT | Performed by: EMERGENCY MEDICINE

## 2021-09-24 PROCEDURE — 25010000002 PIPERACILLIN SOD-TAZOBACTAM PER 1 G: Performed by: EMERGENCY MEDICINE

## 2021-09-24 RX ORDER — SODIUM CHLORIDE 0.9 % (FLUSH) 0.9 %
10 SYRINGE (ML) INJECTION EVERY 12 HOURS SCHEDULED
Status: DISCONTINUED | OUTPATIENT
Start: 2021-09-24 | End: 2021-10-09

## 2021-09-24 RX ORDER — SODIUM CHLORIDE 0.9 % (FLUSH) 0.9 %
10 SYRINGE (ML) INJECTION AS NEEDED
Status: DISCONTINUED | OUTPATIENT
Start: 2021-09-24 | End: 2021-10-08

## 2021-09-24 RX ORDER — CLOBAZAM 10 MG/1
TABLET ORAL
COMMUNITY
End: 2021-10-19 | Stop reason: HOSPADM

## 2021-09-24 RX ORDER — TAMSULOSIN HYDROCHLORIDE 0.4 MG/1
0.4 CAPSULE ORAL NIGHTLY
Status: DISCONTINUED | OUTPATIENT
Start: 2021-09-24 | End: 2021-09-27

## 2021-09-24 RX ORDER — ASPIRIN 81 MG/1
81 TABLET ORAL DAILY
Status: DISCONTINUED | OUTPATIENT
Start: 2021-09-25 | End: 2021-09-26

## 2021-09-24 RX ORDER — BISACODYL 10 MG
10 SUPPOSITORY, RECTAL RECTAL DAILY PRN
Status: DISCONTINUED | OUTPATIENT
Start: 2021-09-24 | End: 2021-09-27

## 2021-09-24 RX ORDER — MONTELUKAST SODIUM 10 MG/1
10 TABLET ORAL DAILY
COMMUNITY
End: 2021-10-19 | Stop reason: HOSPADM

## 2021-09-24 RX ORDER — PHENYTOIN SODIUM 100 MG/1
200 CAPSULE, EXTENDED RELEASE ORAL EVERY 12 HOURS SCHEDULED
Status: DISCONTINUED | OUTPATIENT
Start: 2021-09-24 | End: 2021-09-24

## 2021-09-24 RX ORDER — ACETAMINOPHEN 325 MG/1
650 TABLET ORAL EVERY 4 HOURS PRN
Status: DISCONTINUED | OUTPATIENT
Start: 2021-09-24 | End: 2021-09-27

## 2021-09-24 RX ORDER — OMEPRAZOLE 20 MG/1
20 CAPSULE, DELAYED RELEASE ORAL DAILY
COMMUNITY

## 2021-09-24 RX ORDER — HYDROXYZINE PAMOATE 25 MG/1
25 CAPSULE ORAL 3 TIMES DAILY PRN
COMMUNITY
End: 2021-10-19 | Stop reason: HOSPADM

## 2021-09-24 RX ORDER — HEPARIN SODIUM 5000 [USP'U]/ML
5000 INJECTION, SOLUTION INTRAVENOUS; SUBCUTANEOUS EVERY 8 HOURS SCHEDULED
Status: DISCONTINUED | OUTPATIENT
Start: 2021-09-24 | End: 2021-10-19 | Stop reason: HOSPADM

## 2021-09-24 RX ORDER — POLYETHYLENE GLYCOL 3350 17 G/17G
17 POWDER, FOR SOLUTION ORAL DAILY PRN
Status: DISCONTINUED | OUTPATIENT
Start: 2021-09-24 | End: 2021-09-27

## 2021-09-24 RX ORDER — AMOXICILLIN 250 MG
2 CAPSULE ORAL 2 TIMES DAILY
Status: DISCONTINUED | OUTPATIENT
Start: 2021-09-24 | End: 2021-09-27

## 2021-09-24 RX ORDER — PHENYTOIN SODIUM 50 MG/ML
100 INJECTION, SOLUTION INTRAMUSCULAR; INTRAVENOUS EVERY 6 HOURS
Status: DISCONTINUED | OUTPATIENT
Start: 2021-09-24 | End: 2021-09-24

## 2021-09-24 RX ORDER — BISACODYL 5 MG/1
5 TABLET, DELAYED RELEASE ORAL DAILY PRN
Status: DISCONTINUED | OUTPATIENT
Start: 2021-09-24 | End: 2021-09-27

## 2021-09-24 RX ORDER — SODIUM CHLORIDE 0.9 % (FLUSH) 0.9 %
10 SYRINGE (ML) INJECTION AS NEEDED
Status: DISCONTINUED | OUTPATIENT
Start: 2021-09-24 | End: 2021-10-09

## 2021-09-24 RX ORDER — GUAIFENESIN 600 MG/1
600 TABLET, EXTENDED RELEASE ORAL 2 TIMES DAILY
COMMUNITY

## 2021-09-24 RX ORDER — LEVOTHYROXINE SODIUM 0.03 MG/1
25 TABLET ORAL
Status: DISCONTINUED | OUTPATIENT
Start: 2021-09-25 | End: 2021-09-27

## 2021-09-24 RX ORDER — ATORVASTATIN CALCIUM 10 MG/1
10 TABLET, FILM COATED ORAL NIGHTLY
Status: DISCONTINUED | OUTPATIENT
Start: 2021-09-24 | End: 2021-09-27

## 2021-09-24 RX ADMIN — TAZOBACTAM SODIUM AND PIPERACILLIN SODIUM 3.38 G: 375; 3 INJECTION, SOLUTION INTRAVENOUS at 14:34

## 2021-09-24 RX ADMIN — HEPARIN SODIUM 5000 UNITS: 5000 INJECTION, SOLUTION INTRAVENOUS; SUBCUTANEOUS at 22:59

## 2021-09-24 RX ADMIN — TAZOBACTAM SODIUM AND PIPERACILLIN SODIUM 3.38 G: 375; 3 INJECTION, SOLUTION INTRAVENOUS at 22:58

## 2021-09-24 RX ADMIN — SODIUM CHLORIDE 100 MG PE: 9 INJECTION, SOLUTION INTRAVENOUS at 22:59

## 2021-09-24 RX ADMIN — VANCOMYCIN HYDROCHLORIDE 1750 MG: 10 INJECTION, POWDER, LYOPHILIZED, FOR SOLUTION INTRAVENOUS at 14:35

## 2021-09-24 RX ADMIN — LEVETIRACETAM 2000 MG: 100 INJECTION, SOLUTION INTRAVENOUS at 22:57

## 2021-09-24 RX ADMIN — SODIUM CHLORIDE, PRESERVATIVE FREE 10 ML: 5 INJECTION INTRAVENOUS at 22:59

## 2021-09-25 ENCOUNTER — APPOINTMENT (OUTPATIENT)
Dept: CARDIOLOGY | Facility: HOSPITAL | Age: 76
End: 2021-09-25

## 2021-09-25 LAB
ANION GAP SERPL CALCULATED.3IONS-SCNC: 9 MMOL/L (ref 5–15)
BASOPHILS # BLD AUTO: 0.02 10*3/MM3 (ref 0–0.2)
BASOPHILS NFR BLD AUTO: 0.2 % (ref 0–1.5)
BUN SERPL-MCNC: 13 MG/DL (ref 8–23)
BUN/CREAT SERPL: 22.8 (ref 7–25)
CALCIUM SPEC-SCNC: 8.4 MG/DL (ref 8.6–10.5)
CHLORIDE SERPL-SCNC: 102 MMOL/L (ref 98–107)
CO2 SERPL-SCNC: 27 MMOL/L (ref 22–29)
CREAT SERPL-MCNC: 0.57 MG/DL (ref 0.76–1.27)
CRP SERPL-MCNC: 16.74 MG/DL (ref 0–0.5)
DEPRECATED RDW RBC AUTO: 47.8 FL (ref 37–54)
EOSINOPHIL # BLD AUTO: 0.08 10*3/MM3 (ref 0–0.4)
EOSINOPHIL NFR BLD AUTO: 0.9 % (ref 0.3–6.2)
ERYTHROCYTE [DISTWIDTH] IN BLOOD BY AUTOMATED COUNT: 13 % (ref 12.3–15.4)
GFR SERPL CREATININE-BSD FRML MDRD: 140 ML/MIN/1.73
GLUCOSE SERPL-MCNC: 98 MG/DL (ref 65–99)
HCT VFR BLD AUTO: 43.7 % (ref 37.5–51)
HGB BLD-MCNC: 14 G/DL (ref 13–17.7)
IMM GRANULOCYTES # BLD AUTO: 0.03 10*3/MM3 (ref 0–0.05)
IMM GRANULOCYTES NFR BLD AUTO: 0.3 % (ref 0–0.5)
LYMPHOCYTES # BLD AUTO: 1.19 10*3/MM3 (ref 0.7–3.1)
LYMPHOCYTES NFR BLD AUTO: 13.3 % (ref 19.6–45.3)
MCH RBC QN AUTO: 32 PG (ref 26.6–33)
MCHC RBC AUTO-ENTMCNC: 32 G/DL (ref 31.5–35.7)
MCV RBC AUTO: 100 FL (ref 79–97)
MONOCYTES # BLD AUTO: 0.87 10*3/MM3 (ref 0.1–0.9)
MONOCYTES NFR BLD AUTO: 9.8 % (ref 5–12)
MRSA DNA SPEC QL NAA+PROBE: POSITIVE
NEUTROPHILS NFR BLD AUTO: 6.73 10*3/MM3 (ref 1.7–7)
NEUTROPHILS NFR BLD AUTO: 75.5 % (ref 42.7–76)
NRBC BLD AUTO-RTO: 0 /100 WBC (ref 0–0.2)
PLATELET # BLD AUTO: 190 10*3/MM3 (ref 140–450)
PMV BLD AUTO: 9.5 FL (ref 6–12)
POTASSIUM SERPL-SCNC: 4 MMOL/L (ref 3.5–5.2)
RBC # BLD AUTO: 4.37 10*6/MM3 (ref 4.14–5.8)
SODIUM SERPL-SCNC: 138 MMOL/L (ref 136–145)
WBC # BLD AUTO: 8.92 10*3/MM3 (ref 3.4–10.8)

## 2021-09-25 PROCEDURE — 80048 BASIC METABOLIC PNL TOTAL CA: CPT | Performed by: NURSE PRACTITIONER

## 2021-09-25 PROCEDURE — 94799 UNLISTED PULMONARY SVC/PX: CPT

## 2021-09-25 PROCEDURE — 85025 COMPLETE CBC W/AUTO DIFF WBC: CPT | Performed by: NURSE PRACTITIONER

## 2021-09-25 PROCEDURE — 92610 EVALUATE SWALLOWING FUNCTION: CPT

## 2021-09-25 PROCEDURE — 94660 CPAP INITIATION&MGMT: CPT

## 2021-09-25 PROCEDURE — 99232 SBSQ HOSP IP/OBS MODERATE 35: CPT | Performed by: INTERNAL MEDICINE

## 2021-09-25 PROCEDURE — 93306 TTE W/DOPPLER COMPLETE: CPT | Performed by: INTERNAL MEDICINE

## 2021-09-25 PROCEDURE — 25010000002 HEPARIN (PORCINE) PER 1000 UNITS: Performed by: NURSE PRACTITIONER

## 2021-09-25 PROCEDURE — 87641 MR-STAPH DNA AMP PROBE: CPT | Performed by: NURSE PRACTITIONER

## 2021-09-25 PROCEDURE — 93306 TTE W/DOPPLER COMPLETE: CPT

## 2021-09-25 PROCEDURE — 25010000002 LEVETRIRACETAM PER 10 MG

## 2021-09-25 PROCEDURE — 86140 C-REACTIVE PROTEIN: CPT | Performed by: INTERNAL MEDICINE

## 2021-09-25 PROCEDURE — 25010000002 METHYLPREDNISOLONE PER 40 MG: Performed by: INTERNAL MEDICINE

## 2021-09-25 PROCEDURE — 25010000002 VANCOMYCIN 10 G RECONSTITUTED SOLUTION

## 2021-09-25 PROCEDURE — 25010000002 AZITHROMYCIN PER 500 MG: Performed by: INTERNAL MEDICINE

## 2021-09-25 PROCEDURE — 25010000002 FOSPHENYTOIN 100 MG PE/2ML SOLUTION 2 ML VIAL

## 2021-09-25 PROCEDURE — 25010000002 FUROSEMIDE PER 20 MG: Performed by: INTERNAL MEDICINE

## 2021-09-25 PROCEDURE — 25010000002 PIPERACILLIN SOD-TAZOBACTAM PER 1 G: Performed by: NURSE PRACTITIONER

## 2021-09-25 RX ORDER — FUROSEMIDE 10 MG/ML
60 INJECTION INTRAMUSCULAR; INTRAVENOUS ONCE
Status: COMPLETED | OUTPATIENT
Start: 2021-09-25 | End: 2021-09-25

## 2021-09-25 RX ORDER — METHYLPREDNISOLONE SODIUM SUCCINATE 40 MG/ML
40 INJECTION, POWDER, LYOPHILIZED, FOR SOLUTION INTRAMUSCULAR; INTRAVENOUS DAILY
Status: DISCONTINUED | OUTPATIENT
Start: 2021-09-25 | End: 2021-09-27

## 2021-09-25 RX ADMIN — VANCOMYCIN HYDROCHLORIDE 1250 MG: 10 INJECTION, POWDER, LYOPHILIZED, FOR SOLUTION INTRAVENOUS at 17:53

## 2021-09-25 RX ADMIN — FUROSEMIDE 60 MG: 10 INJECTION INTRAMUSCULAR; INTRAVENOUS at 18:04

## 2021-09-25 RX ADMIN — TAZOBACTAM SODIUM AND PIPERACILLIN SODIUM 3.38 G: 375; 3 INJECTION, SOLUTION INTRAVENOUS at 05:11

## 2021-09-25 RX ADMIN — AZITHROMYCIN 500 MG: 500 INJECTION, POWDER, LYOPHILIZED, FOR SOLUTION INTRAVENOUS at 19:33

## 2021-09-25 RX ADMIN — LEVETIRACETAM 2000 MG: 100 INJECTION, SOLUTION INTRAVENOUS at 08:49

## 2021-09-25 RX ADMIN — TAZOBACTAM SODIUM AND PIPERACILLIN SODIUM 3.38 G: 375; 3 INJECTION, SOLUTION INTRAVENOUS at 21:30

## 2021-09-25 RX ADMIN — HEPARIN SODIUM 5000 UNITS: 5000 INJECTION, SOLUTION INTRAVENOUS; SUBCUTANEOUS at 15:20

## 2021-09-25 RX ADMIN — TAZOBACTAM SODIUM AND PIPERACILLIN SODIUM 3.38 G: 375; 3 INJECTION, SOLUTION INTRAVENOUS at 15:21

## 2021-09-25 RX ADMIN — HEPARIN SODIUM 5000 UNITS: 5000 INJECTION, SOLUTION INTRAVENOUS; SUBCUTANEOUS at 21:30

## 2021-09-25 RX ADMIN — SODIUM CHLORIDE 100 MG PE: 9 INJECTION, SOLUTION INTRAVENOUS at 18:54

## 2021-09-25 RX ADMIN — SODIUM CHLORIDE, PRESERVATIVE FREE 10 ML: 5 INJECTION INTRAVENOUS at 21:24

## 2021-09-25 RX ADMIN — SODIUM CHLORIDE 100 MG PE: 9 INJECTION, SOLUTION INTRAVENOUS at 11:10

## 2021-09-25 RX ADMIN — SODIUM CHLORIDE 100 MG PE: 9 INJECTION, SOLUTION INTRAVENOUS at 23:59

## 2021-09-25 RX ADMIN — LEVETIRACETAM 2000 MG: 100 INJECTION, SOLUTION INTRAVENOUS at 21:20

## 2021-09-25 RX ADMIN — METHYLPREDNISOLONE SODIUM SUCCINATE 40 MG: 40 INJECTION, POWDER, FOR SOLUTION INTRAMUSCULAR; INTRAVENOUS at 18:04

## 2021-09-25 RX ADMIN — SODIUM CHLORIDE 100 MG PE: 9 INJECTION, SOLUTION INTRAVENOUS at 05:11

## 2021-09-25 RX ADMIN — HEPARIN SODIUM 5000 UNITS: 5000 INJECTION, SOLUTION INTRAVENOUS; SUBCUTANEOUS at 05:11

## 2021-09-26 ENCOUNTER — APPOINTMENT (OUTPATIENT)
Dept: GENERAL RADIOLOGY | Facility: HOSPITAL | Age: 76
End: 2021-09-26

## 2021-09-26 LAB
ALBUMIN SERPL-MCNC: 3.9 G/DL (ref 3.5–5.2)
ALBUMIN/GLOB SERPL: 0.8 G/DL
ALP SERPL-CCNC: 159 U/L (ref 39–117)
ALT SERPL W P-5'-P-CCNC: 6 U/L (ref 1–41)
ANION GAP SERPL CALCULATED.3IONS-SCNC: 15 MMOL/L (ref 5–15)
ASCENDING AORTA: 3.1 CM
AST SERPL-CCNC: 9 U/L (ref 1–40)
BASOPHILS # BLD AUTO: 0.03 10*3/MM3 (ref 0–0.2)
BASOPHILS NFR BLD AUTO: 0.4 % (ref 0–1.5)
BH CV ECHO MEAS - AO MAX PG (FULL): 3.2 MMHG
BH CV ECHO MEAS - AO MAX PG: 5.3 MMHG
BH CV ECHO MEAS - AO MEAN PG (FULL): 1.4 MMHG
BH CV ECHO MEAS - AO MEAN PG: 2.4 MMHG
BH CV ECHO MEAS - AO ROOT DIAM: 3.2 CM
BH CV ECHO MEAS - AO V2 MAX: 115.4 CM/SEC
BH CV ECHO MEAS - AO V2 MEAN: 72.1 CM/SEC
BH CV ECHO MEAS - AO V2 VTI: 26.1 CM
BH CV ECHO MEAS - ASC AORTA: 3.1 CM
BH CV ECHO MEAS - AVA(I,A): 2.1 CM^2
BH CV ECHO MEAS - AVA(I,D): 2.1 CM^2
BH CV ECHO MEAS - AVA(V,A): 2 CM^2
BH CV ECHO MEAS - AVA(V,D): 2 CM^2
BH CV ECHO MEAS - BSA(HAYCOCK): 2.1 M^2
BH CV ECHO MEAS - BSA: 2 M^2
BH CV ECHO MEAS - BZI_BMI: 25.9 KILOGRAMS/M^2
BH CV ECHO MEAS - BZI_METRIC_HEIGHT: 180 CM
BH CV ECHO MEAS - BZI_METRIC_WEIGHT: 83.9 KG
BH CV ECHO MEAS - EDV(CUBED): 189.1 ML
BH CV ECHO MEAS - EDV(MOD-SP2): 90.2 ML
BH CV ECHO MEAS - EDV(MOD-SP4): 78.6 ML
BH CV ECHO MEAS - EDV(TEICH): 162.6 ML
BH CV ECHO MEAS - EF(CUBED): 70.6 %
BH CV ECHO MEAS - EF(MOD-BP): 57 %
BH CV ECHO MEAS - EF(MOD-SP2): 56.8 %
BH CV ECHO MEAS - EF(MOD-SP4): 44 %
BH CV ECHO MEAS - EF(TEICH): 61.5 %
BH CV ECHO MEAS - ESV(CUBED): 55.7 ML
BH CV ECHO MEAS - ESV(MOD-SP2): 39 ML
BH CV ECHO MEAS - ESV(MOD-SP4): 44 ML
BH CV ECHO MEAS - ESV(TEICH): 62.7 ML
BH CV ECHO MEAS - FS: 33.5 %
BH CV ECHO MEAS - IVS/LVPW: 1.5
BH CV ECHO MEAS - IVSD: 1.1 CM
BH CV ECHO MEAS - LV DIASTOLIC VOL/BSA (35-75): 38.6 ML/M^2
BH CV ECHO MEAS - LV IVRT: 0.09 SEC
BH CV ECHO MEAS - LV MASS(C)D: 206.9 GRAMS
BH CV ECHO MEAS - LV MASS(C)DI: 101.6 GRAMS/M^2
BH CV ECHO MEAS - LV MAX PG: 2.2 MMHG
BH CV ECHO MEAS - LV MEAN PG: 1.1 MMHG
BH CV ECHO MEAS - LV SYSTOLIC VOL/BSA (12-30): 21.6 ML/M^2
BH CV ECHO MEAS - LV V1 MAX: 73.6 CM/SEC
BH CV ECHO MEAS - LV V1 MEAN: 47.8 CM/SEC
BH CV ECHO MEAS - LV V1 VTI: 17.6 CM
BH CV ECHO MEAS - LVIDD: 5.7 CM
BH CV ECHO MEAS - LVIDS: 3.8 CM
BH CV ECHO MEAS - LVOT AREA: 3.1 CM^2
BH CV ECHO MEAS - LVOT DIAM: 2 CM
BH CV ECHO MEAS - LVPWD: 0.73 CM
BH CV ECHO MEAS - MV A MAX VEL: 79 CM/SEC
BH CV ECHO MEAS - MV DEC SLOPE: 366.6 CM/SEC^2
BH CV ECHO MEAS - MV DEC TIME: 0.23 SEC
BH CV ECHO MEAS - MV E MAX VEL: 85.3 CM/SEC
BH CV ECHO MEAS - MV E/A: 1.1
BH CV ECHO MEAS - MV MAX PG: 3.5 MMHG
BH CV ECHO MEAS - MV MEAN PG: 1.4 MMHG
BH CV ECHO MEAS - MV V2 MAX: 93 CM/SEC
BH CV ECHO MEAS - MV V2 MEAN: 54.9 CM/SEC
BH CV ECHO MEAS - MV V2 VTI: 31.9 CM
BH CV ECHO MEAS - MVA(VTI): 1.7 CM^2
BH CV ECHO MEAS - PA ACC TIME: 0.09 SEC
BH CV ECHO MEAS - PA MAX PG: 2.8 MMHG
BH CV ECHO MEAS - PA PR(ACCEL): 37 MMHG
BH CV ECHO MEAS - PA V2 MAX: 84.2 CM/SEC
BH CV ECHO MEAS - RAP SYSTOLE: 3 MMHG
BH CV ECHO MEAS - RVSP: 15 MMHG
BH CV ECHO MEAS - SI(CUBED): 65.5 ML/M^2
BH CV ECHO MEAS - SI(LVOT): 26.5 ML/M^2
BH CV ECHO MEAS - SI(MOD-SP2): 25.2 ML/M^2
BH CV ECHO MEAS - SI(MOD-SP4): 17 ML/M^2
BH CV ECHO MEAS - SI(TEICH): 49.1 ML/M^2
BH CV ECHO MEAS - SV(CUBED): 133.4 ML
BH CV ECHO MEAS - SV(LVOT): 54 ML
BH CV ECHO MEAS - SV(MOD-SP2): 51.3 ML
BH CV ECHO MEAS - SV(MOD-SP4): 34.6 ML
BH CV ECHO MEAS - SV(TEICH): 99.9 ML
BH CV ECHO MEAS - TAPSE (>1.6): 1.8 CM
BH CV ECHO MEAS - TR MAX PG: 12 MMHG
BH CV ECHO MEAS - TR MAX VEL: 176 CM/SEC
BH CV VAS BP RIGHT ARM: NORMAL MMHG
BH CV XLRA - RV BASE: 3.8 CM
BH CV XLRA - RV LENGTH: 7.3 CM
BH CV XLRA - RV MID: 3.1 CM
BH CV XLRA - TDI S': 15 CM/SEC
BILIRUB SERPL-MCNC: 0.3 MG/DL (ref 0–1.2)
BUN SERPL-MCNC: 14 MG/DL (ref 8–23)
BUN/CREAT SERPL: 18.2 (ref 7–25)
CALCIUM SPEC-SCNC: 8.9 MG/DL (ref 8.6–10.5)
CHLORIDE SERPL-SCNC: 95 MMOL/L (ref 98–107)
CO2 SERPL-SCNC: 31 MMOL/L (ref 22–29)
CREAT SERPL-MCNC: 0.77 MG/DL (ref 0.76–1.27)
CRP SERPL-MCNC: 20.06 MG/DL (ref 0–0.5)
DEPRECATED RDW RBC AUTO: 45.1 FL (ref 37–54)
EOSINOPHIL # BLD AUTO: 0.06 10*3/MM3 (ref 0–0.4)
EOSINOPHIL NFR BLD AUTO: 0.9 % (ref 0.3–6.2)
ERYTHROCYTE [DISTWIDTH] IN BLOOD BY AUTOMATED COUNT: 12.6 % (ref 12.3–15.4)
GFR SERPL CREATININE-BSD FRML MDRD: 99 ML/MIN/1.73
GLOBULIN UR ELPH-MCNC: 5 GM/DL
GLUCOSE BLDC GLUCOMTR-MCNC: 73 MG/DL (ref 70–130)
GLUCOSE BLDC GLUCOMTR-MCNC: 75 MG/DL (ref 70–130)
GLUCOSE SERPL-MCNC: 84 MG/DL (ref 65–99)
HCT VFR BLD AUTO: 46.4 % (ref 37.5–51)
HGB BLD-MCNC: 15 G/DL (ref 13–17.7)
IMM GRANULOCYTES # BLD AUTO: 0.02 10*3/MM3 (ref 0–0.05)
IMM GRANULOCYTES NFR BLD AUTO: 0.3 % (ref 0–0.5)
IVRT: 89 MSEC
LEFT ATRIUM VOLUME INDEX: 20.7 ML/M^2
LEFT ATRIUM VOLUME: 42.3 ML
LV EF 2D ECHO EST: 60 %
LYMPHOCYTES # BLD AUTO: 1.2 10*3/MM3 (ref 0.7–3.1)
LYMPHOCYTES NFR BLD AUTO: 17.1 % (ref 19.6–45.3)
MAGNESIUM SERPL-MCNC: 2 MG/DL (ref 1.6–2.4)
MCH RBC QN AUTO: 31.4 PG (ref 26.6–33)
MCHC RBC AUTO-ENTMCNC: 32.3 G/DL (ref 31.5–35.7)
MCV RBC AUTO: 97.3 FL (ref 79–97)
MONOCYTES # BLD AUTO: 0.67 10*3/MM3 (ref 0.1–0.9)
MONOCYTES NFR BLD AUTO: 9.6 % (ref 5–12)
NEUTROPHILS NFR BLD AUTO: 5.03 10*3/MM3 (ref 1.7–7)
NEUTROPHILS NFR BLD AUTO: 71.7 % (ref 42.7–76)
NRBC BLD AUTO-RTO: 0 /100 WBC (ref 0–0.2)
PHOSPHATE SERPL-MCNC: 3.1 MG/DL (ref 2.5–4.5)
PLATELET # BLD AUTO: 234 10*3/MM3 (ref 140–450)
PMV BLD AUTO: 9.4 FL (ref 6–12)
POTASSIUM SERPL-SCNC: 3.5 MMOL/L (ref 3.5–5.2)
PROCALCITONIN SERPL-MCNC: 0.08 NG/ML (ref 0–0.25)
PROT SERPL-MCNC: 8.9 G/DL (ref 6–8.5)
RBC # BLD AUTO: 4.77 10*6/MM3 (ref 4.14–5.8)
SODIUM SERPL-SCNC: 141 MMOL/L (ref 136–145)
WBC # BLD AUTO: 7.01 10*3/MM3 (ref 3.4–10.8)

## 2021-09-26 PROCEDURE — 25010000002 HEPARIN (PORCINE) PER 1000 UNITS: Performed by: NURSE PRACTITIONER

## 2021-09-26 PROCEDURE — 92610 EVALUATE SWALLOWING FUNCTION: CPT

## 2021-09-26 PROCEDURE — 83735 ASSAY OF MAGNESIUM: CPT | Performed by: INTERNAL MEDICINE

## 2021-09-26 PROCEDURE — 25010000002 VANCOMYCIN 10 G RECONSTITUTED SOLUTION

## 2021-09-26 PROCEDURE — 84100 ASSAY OF PHOSPHORUS: CPT | Performed by: INTERNAL MEDICINE

## 2021-09-26 PROCEDURE — 25010000002 LEVETRIRACETAM PER 10 MG: Performed by: INTERNAL MEDICINE

## 2021-09-26 PROCEDURE — 85025 COMPLETE CBC W/AUTO DIFF WBC: CPT | Performed by: INTERNAL MEDICINE

## 2021-09-26 PROCEDURE — 86140 C-REACTIVE PROTEIN: CPT | Performed by: INTERNAL MEDICINE

## 2021-09-26 PROCEDURE — 99233 SBSQ HOSP IP/OBS HIGH 50: CPT | Performed by: INTERNAL MEDICINE

## 2021-09-26 PROCEDURE — 74018 RADEX ABDOMEN 1 VIEW: CPT

## 2021-09-26 PROCEDURE — 97161 PT EVAL LOW COMPLEX 20 MIN: CPT

## 2021-09-26 PROCEDURE — 80053 COMPREHEN METABOLIC PANEL: CPT | Performed by: INTERNAL MEDICINE

## 2021-09-26 PROCEDURE — 25010000002 AZITHROMYCIN PER 500 MG: Performed by: INTERNAL MEDICINE

## 2021-09-26 PROCEDURE — 25010000002 LEVETRIRACETAM PER 10 MG

## 2021-09-26 PROCEDURE — 25010000002 METHYLPREDNISOLONE PER 40 MG: Performed by: INTERNAL MEDICINE

## 2021-09-26 PROCEDURE — 25010000002 FOSPHENYTOIN 100 MG PE/2ML SOLUTION 2 ML VIAL

## 2021-09-26 PROCEDURE — 25010000002 PIPERACILLIN SOD-TAZOBACTAM PER 1 G: Performed by: NURSE PRACTITIONER

## 2021-09-26 PROCEDURE — 82962 GLUCOSE BLOOD TEST: CPT

## 2021-09-26 PROCEDURE — 84145 PROCALCITONIN (PCT): CPT | Performed by: INTERNAL MEDICINE

## 2021-09-26 RX ORDER — ASPIRIN 81 MG/1
81 TABLET ORAL DAILY
Status: DISCONTINUED | OUTPATIENT
Start: 2021-09-26 | End: 2021-09-27

## 2021-09-26 RX ORDER — ASPIRIN 300 MG/1
300 SUPPOSITORY RECTAL DAILY
Status: DISCONTINUED | OUTPATIENT
Start: 2021-09-26 | End: 2021-09-27

## 2021-09-26 RX ADMIN — TAZOBACTAM SODIUM AND PIPERACILLIN SODIUM 3.38 G: 375; 3 INJECTION, SOLUTION INTRAVENOUS at 15:23

## 2021-09-26 RX ADMIN — HEPARIN SODIUM 5000 UNITS: 5000 INJECTION, SOLUTION INTRAVENOUS; SUBCUTANEOUS at 15:32

## 2021-09-26 RX ADMIN — LEVETIRACETAM 2000 MG: 100 INJECTION, SOLUTION INTRAVENOUS at 22:31

## 2021-09-26 RX ADMIN — SODIUM CHLORIDE 100 MG PE: 9 INJECTION, SOLUTION INTRAVENOUS at 11:53

## 2021-09-26 RX ADMIN — VANCOMYCIN HYDROCHLORIDE 1250 MG: 10 INJECTION, POWDER, LYOPHILIZED, FOR SOLUTION INTRAVENOUS at 15:23

## 2021-09-26 RX ADMIN — METHYLPREDNISOLONE SODIUM SUCCINATE 40 MG: 40 INJECTION, POWDER, FOR SOLUTION INTRAMUSCULAR; INTRAVENOUS at 11:54

## 2021-09-26 RX ADMIN — AZITHROMYCIN 500 MG: 500 INJECTION, POWDER, LYOPHILIZED, FOR SOLUTION INTRAVENOUS at 18:41

## 2021-09-26 RX ADMIN — TAZOBACTAM SODIUM AND PIPERACILLIN SODIUM 3.38 G: 375; 3 INJECTION, SOLUTION INTRAVENOUS at 05:20

## 2021-09-26 RX ADMIN — SODIUM CHLORIDE, PRESERVATIVE FREE 10 ML: 5 INJECTION INTRAVENOUS at 22:32

## 2021-09-26 RX ADMIN — SODIUM CHLORIDE 100 MG PE: 9 INJECTION, SOLUTION INTRAVENOUS at 22:31

## 2021-09-26 RX ADMIN — SODIUM CHLORIDE 100 MG PE: 9 INJECTION, SOLUTION INTRAVENOUS at 18:41

## 2021-09-26 RX ADMIN — LEVETIRACETAM 2000 MG: 100 INJECTION, SOLUTION INTRAVENOUS at 11:52

## 2021-09-26 RX ADMIN — ASPIRIN 300 MG: 300 SUPPOSITORY RECTAL at 11:52

## 2021-09-26 RX ADMIN — SODIUM CHLORIDE 100 MG PE: 9 INJECTION, SOLUTION INTRAVENOUS at 05:20

## 2021-09-26 RX ADMIN — HEPARIN SODIUM 5000 UNITS: 5000 INJECTION, SOLUTION INTRAVENOUS; SUBCUTANEOUS at 22:31

## 2021-09-26 RX ADMIN — HEPARIN SODIUM 5000 UNITS: 5000 INJECTION, SOLUTION INTRAVENOUS; SUBCUTANEOUS at 05:20

## 2021-09-26 RX ADMIN — SODIUM CHLORIDE, PRESERVATIVE FREE 10 ML: 5 INJECTION INTRAVENOUS at 11:54

## 2021-09-26 RX ADMIN — TAZOBACTAM SODIUM AND PIPERACILLIN SODIUM 3.38 G: 375; 3 INJECTION, SOLUTION INTRAVENOUS at 22:31

## 2021-09-26 RX ADMIN — LEVETIRACETAM 2000 MG: 100 INJECTION, SOLUTION INTRAVENOUS at 15:18

## 2021-09-27 ENCOUNTER — APPOINTMENT (OUTPATIENT)
Dept: GENERAL RADIOLOGY | Facility: HOSPITAL | Age: 76
End: 2021-09-27

## 2021-09-27 LAB
ANION GAP SERPL CALCULATED.3IONS-SCNC: 18 MMOL/L (ref 5–15)
BASOPHILS # BLD AUTO: 0.03 10*3/MM3 (ref 0–0.2)
BASOPHILS NFR BLD AUTO: 0.5 % (ref 0–1.5)
BUN SERPL-MCNC: 9 MG/DL (ref 8–23)
BUN/CREAT SERPL: 13.8 (ref 7–25)
CALCIUM SPEC-SCNC: 8.8 MG/DL (ref 8.6–10.5)
CHLORIDE SERPL-SCNC: 100 MMOL/L (ref 98–107)
CO2 SERPL-SCNC: 23 MMOL/L (ref 22–29)
CREAT SERPL-MCNC: 0.65 MG/DL (ref 0.76–1.27)
DEPRECATED RDW RBC AUTO: 46.5 FL (ref 37–54)
EOSINOPHIL # BLD AUTO: 0.07 10*3/MM3 (ref 0–0.4)
EOSINOPHIL NFR BLD AUTO: 1.1 % (ref 0.3–6.2)
ERYTHROCYTE [DISTWIDTH] IN BLOOD BY AUTOMATED COUNT: 13 % (ref 12.3–15.4)
GFR SERPL CREATININE-BSD FRML MDRD: 120 ML/MIN/1.73
GLUCOSE BLDC GLUCOMTR-MCNC: 89 MG/DL (ref 70–130)
GLUCOSE SERPL-MCNC: 76 MG/DL (ref 65–99)
HCT VFR BLD AUTO: 44.8 % (ref 37.5–51)
HGB BLD-MCNC: 14.8 G/DL (ref 13–17.7)
IMM GRANULOCYTES # BLD AUTO: 0.05 10*3/MM3 (ref 0–0.05)
IMM GRANULOCYTES NFR BLD AUTO: 0.8 % (ref 0–0.5)
LYMPHOCYTES # BLD AUTO: 1.03 10*3/MM3 (ref 0.7–3.1)
LYMPHOCYTES NFR BLD AUTO: 16.8 % (ref 19.6–45.3)
MAGNESIUM SERPL-MCNC: 2 MG/DL (ref 1.6–2.4)
MCH RBC QN AUTO: 32 PG (ref 26.6–33)
MCHC RBC AUTO-ENTMCNC: 33 G/DL (ref 31.5–35.7)
MCV RBC AUTO: 96.8 FL (ref 79–97)
MONOCYTES # BLD AUTO: 0.49 10*3/MM3 (ref 0.1–0.9)
MONOCYTES NFR BLD AUTO: 8 % (ref 5–12)
NEUTROPHILS NFR BLD AUTO: 4.45 10*3/MM3 (ref 1.7–7)
NEUTROPHILS NFR BLD AUTO: 72.8 % (ref 42.7–76)
NRBC BLD AUTO-RTO: 0 /100 WBC (ref 0–0.2)
PHOSPHATE SERPL-MCNC: 2.3 MG/DL (ref 2.5–4.5)
PLAT MORPH BLD: NORMAL
PLATELET # BLD AUTO: 215 10*3/MM3 (ref 140–450)
PMV BLD AUTO: 9.8 FL (ref 6–12)
POTASSIUM SERPL-SCNC: 3.9 MMOL/L (ref 3.5–5.2)
RBC # BLD AUTO: 4.63 10*6/MM3 (ref 4.14–5.8)
RBC MORPH BLD: NORMAL
SODIUM SERPL-SCNC: 141 MMOL/L (ref 136–145)
WBC # BLD AUTO: 6.12 10*3/MM3 (ref 3.4–10.8)
WBC MORPH BLD: NORMAL

## 2021-09-27 PROCEDURE — 82962 GLUCOSE BLOOD TEST: CPT

## 2021-09-27 PROCEDURE — 83735 ASSAY OF MAGNESIUM: CPT | Performed by: INTERNAL MEDICINE

## 2021-09-27 PROCEDURE — 74018 RADEX ABDOMEN 1 VIEW: CPT

## 2021-09-27 PROCEDURE — 25010000002 PIPERACILLIN SOD-TAZOBACTAM PER 1 G: Performed by: NURSE PRACTITIONER

## 2021-09-27 PROCEDURE — 94799 UNLISTED PULMONARY SVC/PX: CPT

## 2021-09-27 PROCEDURE — 92611 MOTION FLUOROSCOPY/SWALLOW: CPT

## 2021-09-27 PROCEDURE — 92610 EVALUATE SWALLOWING FUNCTION: CPT

## 2021-09-27 PROCEDURE — 25010000002 LEVETRIRACETAM PER 10 MG

## 2021-09-27 PROCEDURE — 80048 BASIC METABOLIC PNL TOTAL CA: CPT

## 2021-09-27 PROCEDURE — 25010000002 HEPARIN (PORCINE) PER 1000 UNITS: Performed by: NURSE PRACTITIONER

## 2021-09-27 PROCEDURE — 25010000002 FOSPHENYTOIN 100 MG PE/2ML SOLUTION 2 ML VIAL

## 2021-09-27 PROCEDURE — 71045 X-RAY EXAM CHEST 1 VIEW: CPT

## 2021-09-27 PROCEDURE — 99233 SBSQ HOSP IP/OBS HIGH 50: CPT | Performed by: FAMILY MEDICINE

## 2021-09-27 PROCEDURE — 84100 ASSAY OF PHOSPHORUS: CPT | Performed by: INTERNAL MEDICINE

## 2021-09-27 PROCEDURE — 85025 COMPLETE CBC W/AUTO DIFF WBC: CPT | Performed by: INTERNAL MEDICINE

## 2021-09-27 PROCEDURE — 25010000002 LEVETRIRACETAM PER 10 MG: Performed by: INTERNAL MEDICINE

## 2021-09-27 PROCEDURE — 25010000002 METHYLPREDNISOLONE PER 40 MG: Performed by: INTERNAL MEDICINE

## 2021-09-27 PROCEDURE — 74230 X-RAY XM SWLNG FUNCJ C+: CPT

## 2021-09-27 PROCEDURE — 85007 BL SMEAR W/DIFF WBC COUNT: CPT | Performed by: INTERNAL MEDICINE

## 2021-09-27 RX ORDER — BISACODYL 5 MG/1
5 TABLET, DELAYED RELEASE ORAL DAILY PRN
Status: DISCONTINUED | OUTPATIENT
Start: 2021-09-27 | End: 2021-10-06

## 2021-09-27 RX ORDER — ASPIRIN 81 MG/1
81 TABLET, CHEWABLE ORAL DAILY
Status: DISCONTINUED | OUTPATIENT
Start: 2021-09-27 | End: 2021-10-06

## 2021-09-27 RX ORDER — ASPIRIN 300 MG/1
300 SUPPOSITORY RECTAL DAILY
Status: DISCONTINUED | OUTPATIENT
Start: 2021-09-27 | End: 2021-10-06

## 2021-09-27 RX ORDER — LEVOTHYROXINE SODIUM 0.03 MG/1
25 TABLET ORAL
Status: DISCONTINUED | OUTPATIENT
Start: 2021-09-27 | End: 2021-09-29

## 2021-09-27 RX ORDER — POLYETHYLENE GLYCOL 3350 17 G/17G
17 POWDER, FOR SOLUTION ORAL DAILY PRN
Status: DISCONTINUED | OUTPATIENT
Start: 2021-09-27 | End: 2021-10-06

## 2021-09-27 RX ORDER — BISACODYL 10 MG
10 SUPPOSITORY, RECTAL RECTAL DAILY PRN
Status: DISCONTINUED | OUTPATIENT
Start: 2021-09-27 | End: 2021-10-06

## 2021-09-27 RX ORDER — AMINO ACIDS/PROTEIN HYDROLYS 15G-100/30
30 LIQUID (ML) ORAL DAILY
Status: DISCONTINUED | OUTPATIENT
Start: 2021-09-27 | End: 2021-10-06

## 2021-09-27 RX ORDER — ATORVASTATIN CALCIUM 10 MG/1
10 TABLET, FILM COATED ORAL NIGHTLY
Status: DISCONTINUED | OUTPATIENT
Start: 2021-09-27 | End: 2021-10-06

## 2021-09-27 RX ORDER — AMOXICILLIN 250 MG
2 CAPSULE ORAL 2 TIMES DAILY
Status: DISCONTINUED | OUTPATIENT
Start: 2021-09-27 | End: 2021-10-06

## 2021-09-27 RX ORDER — ACETAMINOPHEN 160 MG/5ML
650 SOLUTION ORAL EVERY 4 HOURS PRN
Status: DISCONTINUED | OUTPATIENT
Start: 2021-09-27 | End: 2021-10-06

## 2021-09-27 RX ADMIN — SODIUM CHLORIDE 100 MG PE: 9 INJECTION, SOLUTION INTRAVENOUS at 11:49

## 2021-09-27 RX ADMIN — BARIUM SULFATE 10 ML: 400 SUSPENSION ORAL at 13:21

## 2021-09-27 RX ADMIN — HEPARIN SODIUM 5000 UNITS: 5000 INJECTION, SOLUTION INTRAVENOUS; SUBCUTANEOUS at 18:00

## 2021-09-27 RX ADMIN — HEPARIN SODIUM 5000 UNITS: 5000 INJECTION, SOLUTION INTRAVENOUS; SUBCUTANEOUS at 05:12

## 2021-09-27 RX ADMIN — TAZOBACTAM SODIUM AND PIPERACILLIN SODIUM 3.38 G: 375; 3 INJECTION, SOLUTION INTRAVENOUS at 20:12

## 2021-09-27 RX ADMIN — SODIUM CHLORIDE, PRESERVATIVE FREE 10 ML: 5 INJECTION INTRAVENOUS at 09:18

## 2021-09-27 RX ADMIN — TAZOBACTAM SODIUM AND PIPERACILLIN SODIUM 3.38 G: 375; 3 INJECTION, SOLUTION INTRAVENOUS at 15:50

## 2021-09-27 RX ADMIN — BARIUM SULFATE 20 ML: 400 PASTE ORAL at 13:21

## 2021-09-27 RX ADMIN — TAZOBACTAM SODIUM AND PIPERACILLIN SODIUM 3.38 G: 375; 3 INJECTION, SOLUTION INTRAVENOUS at 05:12

## 2021-09-27 RX ADMIN — SODIUM CHLORIDE, PRESERVATIVE FREE 10 ML: 5 INJECTION INTRAVENOUS at 20:21

## 2021-09-27 RX ADMIN — HEPARIN SODIUM 5000 UNITS: 5000 INJECTION, SOLUTION INTRAVENOUS; SUBCUTANEOUS at 20:14

## 2021-09-27 RX ADMIN — SODIUM CHLORIDE 100 MG PE: 9 INJECTION, SOLUTION INTRAVENOUS at 23:14

## 2021-09-27 RX ADMIN — SODIUM CHLORIDE 100 MG PE: 9 INJECTION, SOLUTION INTRAVENOUS at 18:01

## 2021-09-27 RX ADMIN — METHYLPREDNISOLONE SODIUM SUCCINATE 40 MG: 40 INJECTION, POWDER, FOR SOLUTION INTRAMUSCULAR; INTRAVENOUS at 09:18

## 2021-09-27 RX ADMIN — LEVETIRACETAM 2000 MG: 100 INJECTION, SOLUTION INTRAVENOUS at 09:18

## 2021-09-27 RX ADMIN — LEVETIRACETAM 2000 MG: 100 INJECTION, SOLUTION INTRAVENOUS at 20:12

## 2021-09-27 RX ADMIN — SODIUM CHLORIDE 100 MG PE: 9 INJECTION, SOLUTION INTRAVENOUS at 05:12

## 2021-09-28 LAB
ANION GAP SERPL CALCULATED.3IONS-SCNC: 11 MMOL/L (ref 5–15)
BUN SERPL-MCNC: 5 MG/DL (ref 8–23)
BUN/CREAT SERPL: 7.8 (ref 7–25)
CALCIUM SPEC-SCNC: 8.7 MG/DL (ref 8.6–10.5)
CHLORIDE SERPL-SCNC: 103 MMOL/L (ref 98–107)
CO2 SERPL-SCNC: 26 MMOL/L (ref 22–29)
CREAT SERPL-MCNC: 0.64 MG/DL (ref 0.76–1.27)
GFR SERPL CREATININE-BSD FRML MDRD: 122 ML/MIN/1.73
GLUCOSE BLDC GLUCOMTR-MCNC: 108 MG/DL (ref 70–130)
GLUCOSE BLDC GLUCOMTR-MCNC: 117 MG/DL (ref 70–130)
GLUCOSE BLDC GLUCOMTR-MCNC: 142 MG/DL (ref 70–130)
GLUCOSE SERPL-MCNC: 155 MG/DL (ref 65–99)
POTASSIUM SERPL-SCNC: 3.6 MMOL/L (ref 3.5–5.2)
SODIUM SERPL-SCNC: 140 MMOL/L (ref 136–145)

## 2021-09-28 PROCEDURE — 25010000002 PIPERACILLIN SOD-TAZOBACTAM PER 1 G: Performed by: NURSE PRACTITIONER

## 2021-09-28 PROCEDURE — 92526 ORAL FUNCTION THERAPY: CPT

## 2021-09-28 PROCEDURE — 99232 SBSQ HOSP IP/OBS MODERATE 35: CPT | Performed by: FAMILY MEDICINE

## 2021-09-28 PROCEDURE — 25010000002 FOSPHENYTOIN 100 MG PE/2ML SOLUTION 2 ML VIAL

## 2021-09-28 PROCEDURE — 80048 BASIC METABOLIC PNL TOTAL CA: CPT

## 2021-09-28 PROCEDURE — 82962 GLUCOSE BLOOD TEST: CPT

## 2021-09-28 PROCEDURE — 25010000002 LEVETRIRACETAM PER 10 MG

## 2021-09-28 PROCEDURE — 25010000002 HEPARIN (PORCINE) PER 1000 UNITS: Performed by: NURSE PRACTITIONER

## 2021-09-28 RX ORDER — LEVETIRACETAM 100 MG/ML
2000 SOLUTION ORAL EVERY 12 HOURS SCHEDULED
Status: DISCONTINUED | OUTPATIENT
Start: 2021-09-28 | End: 2021-09-29

## 2021-09-28 RX ORDER — PHENYTOIN 125 MG/5ML
125 SUSPENSION ORAL 3 TIMES DAILY
Status: DISCONTINUED | OUTPATIENT
Start: 2021-09-28 | End: 2021-09-29

## 2021-09-28 RX ADMIN — DOCUSATE SODIUM 50 MG AND SENNOSIDES 8.6 MG 2 TABLET: 8.6; 5 TABLET, FILM COATED ORAL at 08:13

## 2021-09-28 RX ADMIN — SODIUM CHLORIDE, PRESERVATIVE FREE 10 ML: 5 INJECTION INTRAVENOUS at 08:14

## 2021-09-28 RX ADMIN — ATORVASTATIN CALCIUM 10 MG: 10 TABLET, FILM COATED ORAL at 20:07

## 2021-09-28 RX ADMIN — PHENYTOIN 125 MG: 125 SUSPENSION ORAL at 18:19

## 2021-09-28 RX ADMIN — HEPARIN SODIUM 5000 UNITS: 5000 INJECTION, SOLUTION INTRAVENOUS; SUBCUTANEOUS at 13:33

## 2021-09-28 RX ADMIN — TAZOBACTAM SODIUM AND PIPERACILLIN SODIUM 3.38 G: 375; 3 INJECTION, SOLUTION INTRAVENOUS at 05:05

## 2021-09-28 RX ADMIN — HEPARIN SODIUM 5000 UNITS: 5000 INJECTION, SOLUTION INTRAVENOUS; SUBCUTANEOUS at 05:05

## 2021-09-28 RX ADMIN — LEVETIRACETAM 2000 MG: 100 SOLUTION ORAL at 20:07

## 2021-09-28 RX ADMIN — HEPARIN SODIUM 5000 UNITS: 5000 INJECTION, SOLUTION INTRAVENOUS; SUBCUTANEOUS at 20:07

## 2021-09-28 RX ADMIN — PHENYTOIN 125 MG: 125 SUSPENSION ORAL at 20:07

## 2021-09-28 RX ADMIN — SODIUM CHLORIDE, PRESERVATIVE FREE 10 ML: 5 INJECTION INTRAVENOUS at 20:07

## 2021-09-28 RX ADMIN — SODIUM CHLORIDE 100 MG PE: 9 INJECTION, SOLUTION INTRAVENOUS at 04:13

## 2021-09-28 RX ADMIN — SODIUM CHLORIDE 100 MG PE: 9 INJECTION, SOLUTION INTRAVENOUS at 10:59

## 2021-09-28 RX ADMIN — TAZOBACTAM SODIUM AND PIPERACILLIN SODIUM 3.38 G: 375; 3 INJECTION, SOLUTION INTRAVENOUS at 13:33

## 2021-09-28 RX ADMIN — TAZOBACTAM SODIUM AND PIPERACILLIN SODIUM 3.38 G: 375; 3 INJECTION, SOLUTION INTRAVENOUS at 20:13

## 2021-09-28 RX ADMIN — ASPIRIN 81 MG CHEWABLE TABLET 81 MG: 81 TABLET CHEWABLE at 08:14

## 2021-09-28 RX ADMIN — LEVETIRACETAM 2000 MG: 100 INJECTION, SOLUTION INTRAVENOUS at 08:13

## 2021-09-28 RX ADMIN — LEVOTHYROXINE SODIUM 25 MCG: 25 TABLET ORAL at 05:05

## 2021-09-28 RX ADMIN — DOCUSATE SODIUM 50 MG AND SENNOSIDES 8.6 MG 2 TABLET: 8.6; 5 TABLET, FILM COATED ORAL at 20:07

## 2021-09-28 RX ADMIN — Medication 30 ML: at 08:14

## 2021-09-29 LAB
BACTERIA SPEC AEROBE CULT: NORMAL
BACTERIA SPEC AEROBE CULT: NORMAL
GLUCOSE BLDC GLUCOMTR-MCNC: 77 MG/DL (ref 70–130)
GLUCOSE BLDC GLUCOMTR-MCNC: 88 MG/DL (ref 70–130)
GLUCOSE BLDC GLUCOMTR-MCNC: 91 MG/DL (ref 70–130)

## 2021-09-29 PROCEDURE — 25010000002 FOSPHENYTOIN 100 MG PE/2ML SOLUTION 2 ML VIAL: Performed by: NURSE PRACTITIONER

## 2021-09-29 PROCEDURE — 25010000002 HEPARIN (PORCINE) PER 1000 UNITS: Performed by: NURSE PRACTITIONER

## 2021-09-29 PROCEDURE — 25810000003 DEXTROSE 5 % WITH KCL 20 MEQ 20-5 MEQ/L-% SOLUTION: Performed by: NURSE PRACTITIONER

## 2021-09-29 PROCEDURE — 25010000002 LEVETRIRACETAM PER 10 MG: Performed by: NURSE PRACTITIONER

## 2021-09-29 PROCEDURE — 82962 GLUCOSE BLOOD TEST: CPT

## 2021-09-29 PROCEDURE — 99232 SBSQ HOSP IP/OBS MODERATE 35: CPT | Performed by: FAMILY MEDICINE

## 2021-09-29 PROCEDURE — 92526 ORAL FUNCTION THERAPY: CPT

## 2021-09-29 PROCEDURE — 25010000002 PIPERACILLIN SOD-TAZOBACTAM PER 1 G: Performed by: NURSE PRACTITIONER

## 2021-09-29 RX ORDER — LEVOTHYROXINE SODIUM 20 UG/ML
20 INJECTION, SOLUTION INTRAVENOUS
Status: DISCONTINUED | OUTPATIENT
Start: 2021-09-29 | End: 2021-10-06

## 2021-09-29 RX ORDER — POTASSIUM CHLORIDE, DEXTROSE MONOHYDRATE 150; 5 MG/100ML; G/100ML
75 INJECTION, SOLUTION INTRAVENOUS CONTINUOUS
Status: DISCONTINUED | OUTPATIENT
Start: 2021-09-29 | End: 2021-10-07

## 2021-09-29 RX ADMIN — HEPARIN SODIUM 5000 UNITS: 5000 INJECTION, SOLUTION INTRAVENOUS; SUBCUTANEOUS at 22:16

## 2021-09-29 RX ADMIN — MINERAL OIL: 1000 LIQUID ORAL at 22:18

## 2021-09-29 RX ADMIN — HEPARIN SODIUM 5000 UNITS: 5000 INJECTION, SOLUTION INTRAVENOUS; SUBCUTANEOUS at 17:24

## 2021-09-29 RX ADMIN — LEVETIRACETAM 2000 MG: 100 INJECTION, SOLUTION INTRAVENOUS at 22:16

## 2021-09-29 RX ADMIN — TAZOBACTAM SODIUM AND PIPERACILLIN SODIUM 3.38 G: 375; 3 INJECTION, SOLUTION INTRAVENOUS at 06:14

## 2021-09-29 RX ADMIN — HEPARIN SODIUM 5000 UNITS: 5000 INJECTION, SOLUTION INTRAVENOUS; SUBCUTANEOUS at 06:14

## 2021-09-29 RX ADMIN — SODIUM CHLORIDE 100 MG PE: 9 INJECTION, SOLUTION INTRAVENOUS at 10:41

## 2021-09-29 RX ADMIN — SODIUM CHLORIDE 100 MG PE: 9 INJECTION, SOLUTION INTRAVENOUS at 23:03

## 2021-09-29 RX ADMIN — SODIUM CHLORIDE, PRESERVATIVE FREE 10 ML: 5 INJECTION INTRAVENOUS at 23:01

## 2021-09-29 RX ADMIN — LEVETIRACETAM 2000 MG: 100 INJECTION, SOLUTION INTRAVENOUS at 10:41

## 2021-09-29 RX ADMIN — LEVOTHYROXINE SODIUM 20 MCG: 20 INJECTION, SOLUTION INTRAVENOUS at 11:56

## 2021-09-29 RX ADMIN — TAZOBACTAM SODIUM AND PIPERACILLIN SODIUM 3.38 G: 375; 3 INJECTION, SOLUTION INTRAVENOUS at 17:24

## 2021-09-29 RX ADMIN — POTASSIUM CHLORIDE AND DEXTROSE MONOHYDRATE 75 ML/HR: 150; 5 INJECTION, SOLUTION INTRAVENOUS at 23:23

## 2021-09-29 RX ADMIN — SODIUM CHLORIDE 100 MG PE: 9 INJECTION, SOLUTION INTRAVENOUS at 20:27

## 2021-09-29 RX ADMIN — ASPIRIN 300 MG: 300 SUPPOSITORY RECTAL at 12:43

## 2021-09-30 LAB
ANION GAP SERPL CALCULATED.3IONS-SCNC: 10 MMOL/L (ref 5–15)
BASOPHILS # BLD AUTO: 0.02 10*3/MM3 (ref 0–0.2)
BASOPHILS NFR BLD AUTO: 0.5 % (ref 0–1.5)
BUN SERPL-MCNC: 4 MG/DL (ref 8–23)
BUN/CREAT SERPL: 10.8 (ref 7–25)
CALCIUM SPEC-SCNC: 8.8 MG/DL (ref 8.6–10.5)
CHLORIDE SERPL-SCNC: 97 MMOL/L (ref 98–107)
CO2 SERPL-SCNC: 31 MMOL/L (ref 22–29)
CREAT SERPL-MCNC: 0.37 MG/DL (ref 0.76–1.27)
DEPRECATED RDW RBC AUTO: 42.9 FL (ref 37–54)
EOSINOPHIL # BLD AUTO: 0.26 10*3/MM3 (ref 0–0.4)
EOSINOPHIL NFR BLD AUTO: 6.8 % (ref 0.3–6.2)
ERYTHROCYTE [DISTWIDTH] IN BLOOD BY AUTOMATED COUNT: 12.4 % (ref 12.3–15.4)
GFR SERPL CREATININE-BSD FRML MDRD: >150 ML/MIN/1.73
GLUCOSE BLDC GLUCOMTR-MCNC: 108 MG/DL (ref 70–130)
GLUCOSE BLDC GLUCOMTR-MCNC: 110 MG/DL (ref 70–130)
GLUCOSE BLDC GLUCOMTR-MCNC: 85 MG/DL (ref 70–130)
GLUCOSE BLDC GLUCOMTR-MCNC: 95 MG/DL (ref 70–130)
GLUCOSE SERPL-MCNC: 126 MG/DL (ref 65–99)
HCT VFR BLD AUTO: 42.5 % (ref 37.5–51)
HGB BLD-MCNC: 14.4 G/DL (ref 13–17.7)
IMM GRANULOCYTES # BLD AUTO: 0.01 10*3/MM3 (ref 0–0.05)
IMM GRANULOCYTES NFR BLD AUTO: 0.3 % (ref 0–0.5)
LYMPHOCYTES # BLD AUTO: 1.24 10*3/MM3 (ref 0.7–3.1)
LYMPHOCYTES NFR BLD AUTO: 32.2 % (ref 19.6–45.3)
MAGNESIUM SERPL-MCNC: 1.8 MG/DL (ref 1.6–2.4)
MCH RBC QN AUTO: 31.6 PG (ref 26.6–33)
MCHC RBC AUTO-ENTMCNC: 33.9 G/DL (ref 31.5–35.7)
MCV RBC AUTO: 93.2 FL (ref 79–97)
MONOCYTES # BLD AUTO: 0.53 10*3/MM3 (ref 0.1–0.9)
MONOCYTES NFR BLD AUTO: 13.8 % (ref 5–12)
NEUTROPHILS NFR BLD AUTO: 1.79 10*3/MM3 (ref 1.7–7)
NEUTROPHILS NFR BLD AUTO: 46.4 % (ref 42.7–76)
NRBC BLD AUTO-RTO: 0 /100 WBC (ref 0–0.2)
PLATELET # BLD AUTO: 187 10*3/MM3 (ref 140–450)
PMV BLD AUTO: 9.3 FL (ref 6–12)
POTASSIUM SERPL-SCNC: 3.7 MMOL/L (ref 3.5–5.2)
RBC # BLD AUTO: 4.56 10*6/MM3 (ref 4.14–5.8)
SODIUM SERPL-SCNC: 138 MMOL/L (ref 136–145)
WBC # BLD AUTO: 3.85 10*3/MM3 (ref 3.4–10.8)

## 2021-09-30 PROCEDURE — 25810000003 DEXTROSE 5 % WITH KCL 20 MEQ 20-5 MEQ/L-% SOLUTION: Performed by: NURSE PRACTITIONER

## 2021-09-30 PROCEDURE — 82962 GLUCOSE BLOOD TEST: CPT

## 2021-09-30 PROCEDURE — 25010000002 LEVETRIRACETAM PER 10 MG: Performed by: NURSE PRACTITIONER

## 2021-09-30 PROCEDURE — 83735 ASSAY OF MAGNESIUM: CPT | Performed by: NURSE PRACTITIONER

## 2021-09-30 PROCEDURE — 25010000002 HEPARIN (PORCINE) PER 1000 UNITS: Performed by: NURSE PRACTITIONER

## 2021-09-30 PROCEDURE — 80048 BASIC METABOLIC PNL TOTAL CA: CPT | Performed by: NURSE PRACTITIONER

## 2021-09-30 PROCEDURE — 99233 SBSQ HOSP IP/OBS HIGH 50: CPT | Performed by: FAMILY MEDICINE

## 2021-09-30 PROCEDURE — 85025 COMPLETE CBC W/AUTO DIFF WBC: CPT | Performed by: NURSE PRACTITIONER

## 2021-09-30 PROCEDURE — 25010000002 FOSPHENYTOIN 100 MG PE/2ML SOLUTION 2 ML VIAL: Performed by: NURSE PRACTITIONER

## 2021-09-30 RX ADMIN — LEVETIRACETAM 2000 MG: 100 INJECTION, SOLUTION INTRAVENOUS at 09:32

## 2021-09-30 RX ADMIN — MINERAL OIL: 1000 LIQUID ORAL at 14:55

## 2021-09-30 RX ADMIN — POTASSIUM CHLORIDE AND DEXTROSE MONOHYDRATE 75 ML/HR: 150; 5 INJECTION, SOLUTION INTRAVENOUS at 19:12

## 2021-09-30 RX ADMIN — SODIUM CHLORIDE 100 MG PE: 9 INJECTION, SOLUTION INTRAVENOUS at 17:49

## 2021-09-30 RX ADMIN — SODIUM CHLORIDE 100 MG PE: 9 INJECTION, SOLUTION INTRAVENOUS at 05:11

## 2021-09-30 RX ADMIN — HEPARIN SODIUM 5000 UNITS: 5000 INJECTION, SOLUTION INTRAVENOUS; SUBCUTANEOUS at 21:11

## 2021-09-30 RX ADMIN — SODIUM CHLORIDE 100 MG PE: 9 INJECTION, SOLUTION INTRAVENOUS at 12:55

## 2021-09-30 RX ADMIN — LEVETIRACETAM 2000 MG: 100 INJECTION, SOLUTION INTRAVENOUS at 21:11

## 2021-09-30 RX ADMIN — MINERAL OIL: 1000 LIQUID ORAL at 09:32

## 2021-09-30 RX ADMIN — HEPARIN SODIUM 5000 UNITS: 5000 INJECTION, SOLUTION INTRAVENOUS; SUBCUTANEOUS at 14:54

## 2021-09-30 RX ADMIN — LEVOTHYROXINE SODIUM 20 MCG: 20 INJECTION, SOLUTION INTRAVENOUS at 12:55

## 2021-09-30 RX ADMIN — SODIUM CHLORIDE, PRESERVATIVE FREE 10 ML: 5 INJECTION INTRAVENOUS at 21:11

## 2021-09-30 RX ADMIN — ASPIRIN 300 MG: 300 SUPPOSITORY RECTAL at 09:32

## 2021-09-30 RX ADMIN — HEPARIN SODIUM 5000 UNITS: 5000 INJECTION, SOLUTION INTRAVENOUS; SUBCUTANEOUS at 05:11

## 2021-09-30 RX ADMIN — SODIUM CHLORIDE 100 MG PE: 9 INJECTION, SOLUTION INTRAVENOUS at 23:56

## 2021-10-01 LAB
GLUCOSE BLDC GLUCOMTR-MCNC: 110 MG/DL (ref 70–130)
GLUCOSE BLDC GLUCOMTR-MCNC: 112 MG/DL (ref 70–130)
GLUCOSE BLDC GLUCOMTR-MCNC: 115 MG/DL (ref 70–130)
GLUCOSE BLDC GLUCOMTR-MCNC: 99 MG/DL (ref 70–130)

## 2021-10-01 PROCEDURE — 25010000002 HEPARIN (PORCINE) PER 1000 UNITS: Performed by: NURSE PRACTITIONER

## 2021-10-01 PROCEDURE — 92526 ORAL FUNCTION THERAPY: CPT

## 2021-10-01 PROCEDURE — 82962 GLUCOSE BLOOD TEST: CPT

## 2021-10-01 PROCEDURE — 99233 SBSQ HOSP IP/OBS HIGH 50: CPT | Performed by: FAMILY MEDICINE

## 2021-10-01 PROCEDURE — 25810000003 DEXTROSE 5 % WITH KCL 20 MEQ 20-5 MEQ/L-% SOLUTION: Performed by: NURSE PRACTITIONER

## 2021-10-01 PROCEDURE — 25010000002 LEVETRIRACETAM PER 10 MG: Performed by: NURSE PRACTITIONER

## 2021-10-01 PROCEDURE — 25010000002 FOSPHENYTOIN 100 MG PE/2ML SOLUTION 2 ML VIAL: Performed by: NURSE PRACTITIONER

## 2021-10-01 RX ADMIN — SODIUM CHLORIDE, PRESERVATIVE FREE 10 ML: 5 INJECTION INTRAVENOUS at 20:06

## 2021-10-01 RX ADMIN — HEPARIN SODIUM 5000 UNITS: 5000 INJECTION, SOLUTION INTRAVENOUS; SUBCUTANEOUS at 15:43

## 2021-10-01 RX ADMIN — HEPARIN SODIUM 5000 UNITS: 5000 INJECTION, SOLUTION INTRAVENOUS; SUBCUTANEOUS at 20:06

## 2021-10-01 RX ADMIN — POTASSIUM CHLORIDE AND DEXTROSE MONOHYDRATE 75 ML/HR: 150; 5 INJECTION, SOLUTION INTRAVENOUS at 22:39

## 2021-10-01 RX ADMIN — SODIUM CHLORIDE 100 MG PE: 9 INJECTION, SOLUTION INTRAVENOUS at 05:35

## 2021-10-01 RX ADMIN — ASPIRIN 300 MG: 300 SUPPOSITORY RECTAL at 08:49

## 2021-10-01 RX ADMIN — SODIUM CHLORIDE 100 MG PE: 9 INJECTION, SOLUTION INTRAVENOUS at 11:20

## 2021-10-01 RX ADMIN — POTASSIUM CHLORIDE AND DEXTROSE MONOHYDRATE 75 ML/HR: 150; 5 INJECTION, SOLUTION INTRAVENOUS at 08:49

## 2021-10-01 RX ADMIN — MINERAL OIL: 1000 LIQUID ORAL at 15:44

## 2021-10-01 RX ADMIN — MINERAL OIL: 1000 LIQUID ORAL at 09:01

## 2021-10-01 RX ADMIN — LEVETIRACETAM 2000 MG: 100 INJECTION, SOLUTION INTRAVENOUS at 08:49

## 2021-10-01 RX ADMIN — HEPARIN SODIUM 5000 UNITS: 5000 INJECTION, SOLUTION INTRAVENOUS; SUBCUTANEOUS at 05:35

## 2021-10-01 RX ADMIN — SODIUM CHLORIDE 100 MG PE: 9 INJECTION, SOLUTION INTRAVENOUS at 17:03

## 2021-10-01 RX ADMIN — LEVOTHYROXINE SODIUM 20 MCG: 20 INJECTION, SOLUTION INTRAVENOUS at 11:20

## 2021-10-01 RX ADMIN — LEVETIRACETAM 2000 MG: 100 INJECTION, SOLUTION INTRAVENOUS at 20:06

## 2021-10-01 RX ADMIN — MINERAL OIL: 1000 LIQUID ORAL at 20:06

## 2021-10-01 NOTE — PLAN OF CARE
Problem: Palliative Care  Goal: Enhanced Quality of Life  Intervention: Promote Advance Care Planning  Recent Flowsheet Documentation  Taken 10/1/2021 1102 by Zoe Carrillo MSW  Life Transition/Adjustment: (awaiting decision from state guardianship about EOL care) other (see comments)  Patient resting calmly and comfortably - nonverbal indicators of pain/discomfort absent.  EOL request sent to [facility] nurse and awaiting guardianship discussion with family/decision.  Patient DNR request approved yesterday.    1300 Palliative Medicine IDT - Palliative Team members present:  QUITA Vázquez DO; QUITA Hall APRN; JUNIOR Lira RN, CHPN; NEHEMIAH Otto RN, CHPN; JUNIOR [facility], [facility]; SHEYLA Landa MDiv; Hospice  CELIA George RN, CHPN    4278 Discussion with [facility] Nurse Consultant MARISA Brock RN regarding EOL care recommendation/request - she states family is not in agreement.  Will await guidance from guardianship - if they do not pursue guardianship, then state guardianship will proceed with recommendation request.  Likely at least Monday before further information.  Notified Hospital Medicine JUNIOR Abbott DO and Palliative Medicine QUITA Vázquez DO.  DNR remains in place.    2656 Dr. Abbott spoke with patient's sister - status/issues/prognosis explained - pts sister will discuss with her brother.

## 2021-10-01 NOTE — PROGRESS NOTES
[facility]  PROGRESS NOTE    Patient Name: Rao Alcazar  : 1945  MRN: [id number]    Date of Admission: 2021  Primary Care Physician: Provider, No Known    Subjective   Subjective     CC:  F/u Fever and hypoxia    HPI:  Patient is more alert today. He indicates that he wants food and to go home.       ROS:  Unable to obtain due to mental status     Objective   Objective     Vital Signs:   Temp:  [97.2 °F (36.2 °C)-98.4 °F (36.9 °C)] 97.7 °F (36.5 °C)  Heart Rate:  [68-74] 70  Resp:  [18] 18  BP: (132-147)/(62-84) 141/70  Flow (L/min):  [2] 2     Physical Exam:  Constitutional: No acute distress, awake, chronically ill appearing male   HENT: NCAT, mucous membranes moist  Respiratory: Clear to auscultation bilaterally, respiratory effort normal   Cardiovascular: RRR, no murmurs, rubs, or gallops  Gastrointestinal: Positive bowel sounds, soft, nontender, nondistended  Musculoskeletal: No bilateral ankle edema  Psychiatric: unable to assess   Neurologic: Opens eyes to stimuli   Skin: No rashes      Results Reviewed:  LAB RESULTS:      Lab 21  1205 21  0835 21  1043 21  0558   WBC 3.85 6.12 7.01 8.92   HEMOGLOBIN 14.4 14.8 15.0 14.0   HEMATOCRIT 42.5 44.8 46.4 43.7   PLATELETS 187 215 234 190   NEUTROS ABS 1.79 4.45 5.03 6.73   IMMATURE GRANS (ABS) 0.01 0.05 0.02 0.03   LYMPHS ABS 1.24 1.03 1.20 1.19   MONOS ABS 0.53 0.49 0.67 0.87   EOS ABS 0.26 0.07 0.06 0.08   MCV 93.2 96.8 97.3* 100.0*   CRP  --   --  20.06* 16.74*   PROCALCITONIN  --   --  0.08  --          Lab 21  1205 21  0720 21  0835 21  1043 21  0558   SODIUM 138 140 141 141 138   POTASSIUM 3.7 3.6 3.9 3.5 4.0   CHLORIDE 97* 103 100 95* 102   CO2 31.0* 26.0 23.0 31.0* 27.0   ANION GAP 10.0 11.0 18.0* 15.0 9.0   BUN 4* 5* 9 14 13   CREATININE 0.37* 0.64* 0.65* 0.77 0.57*   GLUCOSE 126* 155* 76 84 98   CALCIUM 8.8 8.7 8.8 8.9 8.4*   MAGNESIUM 1.8  --   2.0 2.0  --    PHOSPHORUS  --   --  2.3* 3.1  --          Lab 09/26/21  1043   TOTAL PROTEIN 8.9*   ALBUMIN 3.90   GLOBULIN 5.0   ALT (SGPT) 6   AST (SGOT) 9   BILIRUBIN 0.3   ALK PHOS 159*                     Brief Urine Lab Results  (Last result in the past 365 days)      Color   Clarity   Blood   Leuk Est   Nitrite   Protein   CREAT   Urine HCG        09/24/21 1203 Yellow Clear Negative Negative Negative Negative               Microbiology Results Abnormal     Procedure Component Value - Date/Time    Blood Culture - Blood, Arm, Right [[id number]] Collected: 09/24/21 1150    Lab Status: Final result Specimen: Blood from Arm, Right Updated: 09/29/21 1231     Blood Culture No growth at 5 days    Blood Culture - Blood, Arm, Left [[id number]] Collected: 09/24/21 1150    Lab Status: Final result Specimen: Blood from Arm, Left Updated: 09/29/21 1231     Blood Culture No growth at 5 days    COVID PRE-OP / PRE-PROCEDURE SCREENING ORDER (NO ISOLATION) - Swab, Nasopharynx [[id number]]  (Normal) Collected: 09/24/21 1208    Lab Status: Final result Specimen: Swab from Nasopharynx Updated: 09/24/21 1236    Narrative:      The following orders were created for panel order COVID PRE-OP / PRE-PROCEDURE SCREENING ORDER (NO ISOLATION) - Swab, Nasopharynx.  Procedure                               Abnormality         Status                     ---------                               -----------         ------                     COVID-19 and FLU A/B PCR...[[id number]]  Normal              Final result                 Please view results for these tests on the individual orders.    COVID-19 and FLU A/B PCR - Swab, Nasopharynx [[id number]]  (Normal) Collected: 09/24/21 1208    Lab Status: Final result Specimen: Swab from Nasopharynx Updated: 09/24/21 1236     COVID19 Not Detected     Influenza A PCR Not Detected     Influenza B PCR Not Detected    Narrative:      Fact sheet for providers: https://www.fda.gov/media/115088/download    Fact  sheet for patients: https://www.[employer].gov/media/559208/download    Test performed by PCR.          No radiology results from the last 24 hrs    Results for orders placed during the hospital encounter of 09/24/21    Adult Transthoracic Echo Complete W/ Cont if Necessary Per Protocol    Interpretation Summary  · Normal left ventricular systolic function, estimated EF 60%.  · Aortic sclerosis without aortic stenosis.  · Trace aortic insufficiency.  · Trace mitral regurgitation, trace tricuspid regurgitation, normal RVSP.      I have reviewed the medications:  Scheduled Meds:aspirin, 81 mg, Nasogastric, Daily   Or  aspirin, 300 mg, Rectal, Daily  atorvastatin, 10 mg, Nasogastric, Nightly  fosphenytoin, 100 mg PE, Intravenous, Q6H  heparin (porcine), 5,000 Units, Subcutaneous, Q8H  levETIRAcetam, 2,000 mg, Intravenous, Q12H  Levothyroxine Sodium, 20 mcg, Intravenous, Daily  palliative care oral rinse, , Mouth/Throat, TID - RT  PRO-STAT, 30 mL, Nasogastric, Daily  senna-docusate sodium, 2 tablet, Nasogastric, BID  sodium chloride, 10 mL, Intravenous, Q12H      Continuous Infusions:dextrose 5 % with KCl 20 mEq, 75 mL/hr, Last Rate: 75 mL/hr (10/01/21 0849)      PRN Meds:.•  acetaminophen  •  senna-docusate sodium **AND** polyethylene glycol **AND** bisacodyl **AND** bisacodyl  •  sodium chloride  •  sodium chloride    Assessment/Plan   Assessment & Plan     Active Hospital Problems    Diagnosis  POA   • Pneumonia of both lower lobes due to infectious organism [J18.9]  Yes   • Chronic diastolic CHF (congestive heart failure) (HCC) [I50.32]  Unknown   • Hyperlipidemia [E78.5]  Yes   • Mental disorder [F99]  Yes   • History of seizure disorder [Z86.69]  Not Applicable   • Cardiomegaly [I51.7]  Yes      Resolved Hospital Problems   No resolved problems to display.        Brief Hospital Course to date:  Rao Alcazar is a 76 y.o. male patient with severe intellectual disability, nonverbal at baseline (guardian of state), with  "history of dyslipidemia, seizure disorder and chronic hypoxic respiratory failure on home oxygen at 2 L/min presented to the hospital with fever and cough and was found to have bilateral basal pneumonia.     Assessment and plan:    This patient's problems and plans were partially entered by my partner and updated as appropriate by me 10/01/21.    [facility]   This patient suffers from chronic conditions such as seizure disorder, cognitive delay and heart failure. DNR/DNI is appropriate as this would only prolong suffering and would most likely not be successful.   This patient has been diagnosed with severe dysphagia. He has been receiving nutrition via an NG tube which is temporary. He has pulled at this tube and continues trying to push it out with his mouth. His sitter, whom knows him well, states that he \"lives to eat\". A comfort diet along with comfort measures would be most appropriate as a PEG placement and any further aggressive treatment would only prolong suffering. He is progressive in his chronic conditions with no improvement foreseen.     Bilateral community-acquired pneumonia vs    aspiration pneumonia  Dysphagia   -CT chest showed bilateral pulmonary infiltrates and pleural effusion, concerning for community acquired pneumonia  -MRSA swab is positive.  -zosyn completed   -Currently has NG, has been pulling at tube and mouthing and moving       Seizure disorder  - Keppra and Dilantin  -trying to obtain home onfi   -Seizure precautions    Diastolic heart failure, not in decompensation  - echo EF 60%, aortic stenosis, normal RVSP   -Continue aspirin     Hyperlipidemia  · Continue statin     Severe intellectual disability   Nonverbal     Family Update  Patient is a goddard of the [employer]. I spoke with his guardian and she gave permission for me to speak to his step sister that he has not seen in well over 20 years ( he was made a goddard of the [employer] [facility] [employer] in 70's). She stated that she wanted him to leave. I discussed that " he has chronic medical conditions and that all he wants is to go home and to eat. I reminded her that he just wants to get back to his previous living situation. She said she is going to talk to her step brother. She states she herself has had a PEG and it is painful. I reminded her that the only way she is going to make a choice is if she seeks guardianship.     DVT prophylaxis:  Medical DVT prophylaxis orders are present.       AM-PAC 6 Clicks Score (PT): 6 (10/01/21 0800)    Disposition: I expect the patient to be discharged to be determined    CODE STATUS:   Code Status and Medical Interventions:   Ordered at: 09/30/21 4051     Limited Support to NOT Include:    Intubation    Cardioversion/Defibrillation     Code Status:    No CPR     Medical Interventions (Level of Support Prior to Arrest):    Limited       Sapphire Abbott,   10/01/21

## 2021-10-01 NOTE — PLAN OF CARE
Goal Outcome Evaluation:  Plan of Care Reviewed With: patient   SLP treatment completed. Will continue to address dysphagia. Please see note for further details and recommendations.

## 2021-10-01 NOTE — CASE MANAGEMENT/SOCIAL WORK
Continued Stay Note  [facility]     Patient Name: Rao Alcazar  MRN: [id number]  Today's Date: 10/1/2021    Admit Date: 9/24/2021    Discharge Plan     Row Name 10/01/21 1053       Plan    Plan Comments  Case management continues to follow for discharge needs. DNR/DNI was initiated yesterday and faxed to state through Palliative team. MSW remains available. Plan per MD rounds is for pt to have keofeed as this is more appropriate for pt at this time. MSW continues to follow and will make any discharge arrangements as needed.        Discharge Codes    No documentation.       Expected Discharge Date and Time     Expected Discharge Date Expected Discharge Time    Oct 1, 2021             CHELSEY Jurado

## 2021-10-01 NOTE — NURSING NOTE
WOC consult for:  Follow-up to sacrococcygeal area     Assessment and Care provided:   1. Sacrococcygeal area symptoms very similar to previous work assessment.  Scar tissue hyperpigmented and pink areas all seem to darrius.  Continue with current plan of care of Zguard and turning every 2 hours  2. Heels are intact.  Patient was found without a heel boots, legs were crossed and pink areas were blanchable.     Recommendation(s):   *Maintain good skin care, keep dry, turn q 2 hr with wedge if possible, keep heels elevated and offloaded with heel boots.    *Apply z-guard to bottom and bony prominences daily and as needed with incontinence episodes.  *Follow C.A.R.E protocol for medical devices (Bipap, diaz, Ng tube, etc)      All skin interventions in place. Patient is on low air loss specialty mattress. Head to toe assessment completed. WOC orders placed.  Discussed plan of care with RN. Thank you for consulting WOCN.  Will  sign off.  Please call with questions or if needs arise.

## 2021-10-01 NOTE — CASE MANAGEMENT/SOCIAL WORK
Continued Stay Note  [facility]     Patient Name: Rao Alcazar  MRN: [id number]  Today's Date: 10/1/2021    Admit Date: 9/24/2021    Discharge Plan     Row Name 10/01/21 1611       Plan    Plan Comments  NALINI had spoken with both [facility] and the medical coordinator [place] for pt's home. [place] reports that regardless, they will be able to accept pt back when ready for discharge, they will just have to be updated on everything so they know the plan of care and what to do. Pt's DNR status came abck today approved by the state, however, per state, the pt's family is not in agreement with Hospice/comfort measures at this time and may pursue guardianship of pt. Due to this, plan of care will be to place keofeed for now and reevaluate guardianshp situation with family. Physician has spoken with pt's family members to update and provide further information. pt's family to discuss and let sate and everyone know on Monday. NALINI continues to follow for discharge needs.        Discharge Codes    No documentation.       Expected Discharge Date and Time     Expected Discharge Date Expected Discharge Time    Oct 1, 2021             CHELSEY Jurado

## 2021-10-01 NOTE — PLAN OF CARE
Goal Outcome Evaluation:               PT NSR. Pts facility nurse to visit pt. Nurse informed RN that pt had a modified diet at home and the facility would like him to eat.

## 2021-10-01 NOTE — THERAPY TREATMENT NOTE
Acute Care - Speech Language Pathology   Swallow Treatment Note [facility]     Patient Name: Rao Alcazar  : 1945  MRN: [id number]  Today's Date: 10/1/2021               Admit Date: 2021    Visit Dx:     ICD-10-CM ICD-9-CM   1. Pneumonia of both lower lobes due to infectious organism  J18.9 486   2. Acute on chronic respiratory failure with hypoxia (HCC)  J96.21 518.84     799.02   3. Oropharyngeal dysphagia  R13.12 787.22     Patient Active Problem List   Diagnosis   • Atypical chest pain   • Cardiomegaly   • Congestive heart failure (HCC)   • Shortness of breath   • Cardiac mass   • Chest pain   • Mental disorder   • History of panic attacks   • History of seizure disorder   • Mental retardation   • Hyperlipidemia   • Pneumonia of both lower lobes due to infectious organism   • Chronic diastolic CHF (congestive heart failure) (HCC)     Past Medical History:   Diagnosis Date   • Atypical chest pain    • Cardiomegaly    • CHF (congestive heart failure) (CMS/HCC)    • History of panic attacks    • History of seizure disorder    • Mental disorder    • Mental retardation    • Panic attacks    • Seizures (CMS/HCC)      Past Surgical History:   Procedure Laterality Date   • EXPLORATORY LAPAROTOMY         SLP Recommendation and Plan     SLP Diet Recommendation: NPO, temporary alternate methods of nutrition/hydration, long term alternate methods of nutrition/hydration, other (see comments) (per MD discretion) (10/01/21 1440)  Recommended Precautions and Strategies: general aspiration precautions (10/01/21 1440)  SLP Rec. for Method of Medication Administration: meds via alternate route (10/01/21 1440)     Monitor for Signs of Aspiration: yes, notify SLP if any concerns (10/01/21 1440)     Swallow Criteria for Skilled Therapeutic Interventions Met: demonstrates skilled criteria (10/01/21 1440)  Anticipated Discharge Disposition (SLP): unknown, anticipate therapy at next level of care (10/01/21 1440)  Rehab  Potential/Prognosis, Swallowing: adequate, monitor progress closely (10/01/21 1440)  Therapy Frequency (Swallow): PRN (10/01/21 1440)  Predicted Duration Therapy Intervention (Days): until discharge (10/01/21 1440)     Daily Summary of Progress (SLP): progress toward functional goals as expected (10/01/21 1440)    Plan for Continued Treatment (SLP): Continued s/s of aspiration during all trials of thin liquids and ice chips this date. Wet vocal quality and coughing present throughout. Safest recommendation continues to be NPO w/ alternate route of nutrition. Continue to follow.  (10/01/21 1440)              Plan of Care Reviewed With: patient         SWALLOW EVALUATION (last 72 hours)      SLP Adult Swallow Evaluation     Row Name 10/01/21 1440 09/29/21 0900                Rehab Evaluation    Document Type  therapy note (daily note)  -EN  therapy note (daily note)  -RD       Subjective Information  no complaints  -EN  no complaints  -RD       Patient Observations  cooperative  -EN  alert;cooperative  -RD       Patient/Family/Caregiver Comments/Observations  no family present   -EN  no family present  -RD       Care Plan Review  evaluation/treatment results reviewed;care plan/treatment goals reviewed;risks/benefits reviewed;current/potential barriers reviewed;patient/other agree to care plan  -EN  evaluation/treatment results reviewed;care plan/treatment goals reviewed;risks/benefits reviewed;current/potential barriers reviewed;patient/other agree to care plan  -RD       Patient Effort  adequate  -EN  adequate  -RD       Symptoms Noted During/After Treatment  none  -EN  --          General Information    Patient Profile Reviewed  yes  -EN  --          Pain    Additional Documentation  Pain Scale: FACES Pre/Post-Treatment (Group)  -EN  Pain Scale: FACES Pre/Post-Treatment (Group)  -RD          Pain Scale: FACES Pre/Post-Treatment    Pain: FACES Scale, Pretreatment  0-->no hurt  -EN  2-->hurts little bit  -RD        Posttreatment Pain Rating  0-->no hurt  -EN  2-->hurts little bit  -RD          Clinical Impression    Daily Summary of Progress (SLP)  progress toward functional goals as expected  -EN  progress toward functional goals as expected  -RD       SLP Swallowing Diagnosis  --  mod-severe;oral dysphagia;severe;pharyngeal dysphagia  -RD       Functional Impact  risk of aspiration/pneumonia;risk of malnutrition;risk of dehydration  -EN  risk of aspiration/pneumonia;risk of malnutrition;risk of dehydration  -RD       Rehab Potential/Prognosis, Swallowing  adequate, monitor progress closely  -EN  adequate, monitor progress closely  -RD       Swallow Criteria for Skilled Therapeutic Interventions Met  demonstrates skilled criteria  -EN  demonstrates skilled criteria  -RD       Plan for Continued Treatment (SLP)  Continued s/s of aspiration during all trials of thin liquids and ice chips this date. Wet vocal quality and coughing present throughout. Safest recommendation continues to be NPO w/ alternate route of nutrition. Continue to follow.   -EN  Saw for dysphagia treatment. Oral care recently completed. Therapeutic H2O trials given w/ overt s/s of aspiration c/b wet vocal quality & coughing/choking requiring suctioning. Pt continuing to cough at end of treatment & requiring suctions. Suspect cont'd severe dysphagia. Will continue to follow. Safest still NPO w/ meds via alt route. Per RN pt pulled NG tube.   -RD          Recommendations    Therapy Frequency (Swallow)  PRN  -EN  PRN  -RD       Predicted Duration Therapy Intervention (Days)  until discharge  -EN  until discharge  -RD       SLP Diet Recommendation  NPO;temporary alternate methods of nutrition/hydration;long term alternate methods of nutrition/hydration;other (see comments) per MD discretion  -EN  NPO;temporary alternate methods of nutrition/hydration;long term alternate methods of nutrition/hydration;other (see comments) per MD discretion  -RD       Recommended  Precautions and Strategies  general aspiration precautions  -EN  general aspiration precautions  -RD       Oral Care Recommendations  Oral Care BID/PRN;Suction toothbrush  -EN  Oral Care BID/PRN;Suction toothbrush  -RD       SLP Rec. for Method of Medication Administration  meds via alternate route  -EN  meds via alternate route  -RD       Monitor for Signs of Aspiration  yes;notify SLP if any concerns  -EN  yes;notify SLP if any concerns  -RD       Anticipated Discharge Disposition (SLP)  unknown;anticipate therapy at next level of care  -EN  unknown;anticipate therapy at next level of care  -RD         User Key  (r) = Recorded By, (t) = Taken By, (c) = Cosigned By    Initials Name Effective Dates    RD Ariana Smalls MS CCC-SLP 06/16/21 -     EN Radhika Resendez MS, CFY-SLP 05/20/21 -           EDUCATION  The patient has been educated in the following areas:   Dysphagia (Swallowing Impairment).       SLP GOALS     Row Name 10/01/21 1440 09/29/21 0900          Oral Nutrition/Hydration Goal 1 (SLP)    Oral Nutrition/Hydration Goal 1, SLP  LTG: Pt will return to PO diet w/o s/s of aspiration w/ 100% accuracy w/o cues  -EN  LTG: Pt will return to PO diet w/o s/s of aspiration w/ 100% accuracy w/o cues  -RD     Time Frame (Oral Nutrition/Hydration Goal 1, SLP)  by discharge  -EN  by discharge  -RD     Progress/Outcomes (Oral Nutrition/Hydration Goal 1, SLP)  progress slower than expected  -EN  progress slower than expected  -RD        Oral Nutrition/Hydration Goal 2 (SLP)    Oral Nutrition/Hydration Goal 2, SLP  Pt will tolerate therapeutic H2O trials w/o s/s of aspiration w/ 60% accuracy to indicate readiness for repeat instrumental eval.  -EN  Pt will tolerate therapeutic H2O trials w/o s/s of aspiration w/ 60% accuracy to indicate readiness for repeat instrumental eval.  -RD     Time Frame (Oral Nutrition/Hydration Goal 2, SLP)  short term goal (STG)  -EN  short term goal (STG)  -RD     Barriers (Oral  Nutrition/Hydration Goal 2, SLP)  --  overt s/s of aspiration w/ therapeutic thin trials c/b wet vocal quality & coughing w/ suctioning. Not ready for repeat instrumental eval  -RD     Progress/Outcomes (Oral Nutrition/Hydration Goal 2, SLP)  progress slower than expected  -EN  progress slower than expected  -RD       User Key  (r) = Recorded By, (t) = Taken By, (c) = Cosigned By    Initials Name Provider Type    Ariana Perkins MS CCC-SLP Speech and Language Pathologist    EN Radhika Resendez MS, CFY-SLP Speech and Language Pathologist             Time Calculation:   Time Calculation- SLP     Row Name 10/01/21 1609             Time Calculation- SLP    SLP Start Time  1440  -EN      SLP Received On  10/01/21  -EN         Untimed Charges    05533-CA Treatment Swallow Minutes  25  -EN         Total Minutes    Untimed Charges Total Minutes  25  -EN       Total Minutes  25  -EN        User Key  (r) = Recorded By, (t) = Taken By, (c) = Cosigned By    Initials Name Provider Type    Radhika Madison MS, CFY-SLP Speech and Language Pathologist          Therapy Charges for Today     Code Description Service Date Service Provider Modifiers Qty    08289895222 HC ST TREATMENT SWALLOW 2 10/1/2021 Radhika Resendez MS, CFY-SLP GN 1               Radhika Resendez MS, ROSENDO-SLP  10/1/2021

## 2021-10-02 LAB
ANION GAP SERPL CALCULATED.3IONS-SCNC: 5 MMOL/L (ref 5–15)
BUN SERPL-MCNC: 3 MG/DL (ref 8–23)
BUN/CREAT SERPL: 7.9 (ref 7–25)
CALCIUM SPEC-SCNC: 8.9 MG/DL (ref 8.6–10.5)
CHLORIDE SERPL-SCNC: 100 MMOL/L (ref 98–107)
CO2 SERPL-SCNC: 32 MMOL/L (ref 22–29)
CREAT SERPL-MCNC: 0.38 MG/DL (ref 0.76–1.27)
GFR SERPL CREATININE-BSD FRML MDRD: >150 ML/MIN/1.73
GLUCOSE BLDC GLUCOMTR-MCNC: 106 MG/DL (ref 70–130)
GLUCOSE BLDC GLUCOMTR-MCNC: 151 MG/DL (ref 70–130)
GLUCOSE BLDC GLUCOMTR-MCNC: 97 MG/DL (ref 70–130)
GLUCOSE BLDC GLUCOMTR-MCNC: 97 MG/DL (ref 70–130)
GLUCOSE SERPL-MCNC: 115 MG/DL (ref 65–99)
POTASSIUM SERPL-SCNC: 3.8 MMOL/L (ref 3.5–5.2)
SODIUM SERPL-SCNC: 137 MMOL/L (ref 136–145)

## 2021-10-02 PROCEDURE — 80048 BASIC METABOLIC PNL TOTAL CA: CPT | Performed by: FAMILY MEDICINE

## 2021-10-02 PROCEDURE — 82962 GLUCOSE BLOOD TEST: CPT

## 2021-10-02 PROCEDURE — 25010000002 LEVETRIRACETAM PER 10 MG: Performed by: NURSE PRACTITIONER

## 2021-10-02 PROCEDURE — 99232 SBSQ HOSP IP/OBS MODERATE 35: CPT | Performed by: FAMILY MEDICINE

## 2021-10-02 PROCEDURE — 25010000002 HEPARIN (PORCINE) PER 1000 UNITS: Performed by: NURSE PRACTITIONER

## 2021-10-02 PROCEDURE — 25010000002 FOSPHENYTOIN 100 MG PE/2ML SOLUTION 2 ML VIAL: Performed by: NURSE PRACTITIONER

## 2021-10-02 RX ADMIN — MINERAL OIL: 1000 LIQUID ORAL at 14:28

## 2021-10-02 RX ADMIN — SODIUM CHLORIDE 100 MG PE: 9 INJECTION, SOLUTION INTRAVENOUS at 17:59

## 2021-10-02 RX ADMIN — SODIUM CHLORIDE, PRESERVATIVE FREE 10 ML: 5 INJECTION INTRAVENOUS at 21:52

## 2021-10-02 RX ADMIN — LEVETIRACETAM 2000 MG: 100 INJECTION, SOLUTION INTRAVENOUS at 21:51

## 2021-10-02 RX ADMIN — SODIUM CHLORIDE 100 MG PE: 9 INJECTION, SOLUTION INTRAVENOUS at 08:38

## 2021-10-02 RX ADMIN — MINERAL OIL: 1000 LIQUID ORAL at 21:00

## 2021-10-02 RX ADMIN — ASPIRIN 300 MG: 300 SUPPOSITORY RECTAL at 08:39

## 2021-10-02 RX ADMIN — HEPARIN SODIUM 5000 UNITS: 5000 INJECTION, SOLUTION INTRAVENOUS; SUBCUTANEOUS at 14:28

## 2021-10-02 RX ADMIN — LEVETIRACETAM 2000 MG: 100 INJECTION, SOLUTION INTRAVENOUS at 08:39

## 2021-10-02 RX ADMIN — LEVOTHYROXINE SODIUM 20 MCG: 20 INJECTION, SOLUTION INTRAVENOUS at 12:22

## 2021-10-02 RX ADMIN — SODIUM CHLORIDE 100 MG PE: 9 INJECTION, SOLUTION INTRAVENOUS at 00:28

## 2021-10-02 RX ADMIN — HEPARIN SODIUM 5000 UNITS: 5000 INJECTION, SOLUTION INTRAVENOUS; SUBCUTANEOUS at 08:45

## 2021-10-02 RX ADMIN — SODIUM CHLORIDE, PRESERVATIVE FREE 10 ML: 5 INJECTION INTRAVENOUS at 08:39

## 2021-10-02 RX ADMIN — HEPARIN SODIUM 5000 UNITS: 5000 INJECTION, SOLUTION INTRAVENOUS; SUBCUTANEOUS at 21:51

## 2021-10-02 RX ADMIN — MINERAL OIL: 1000 LIQUID ORAL at 08:44

## 2021-10-02 RX ADMIN — SODIUM CHLORIDE 100 MG PE: 9 INJECTION, SOLUTION INTRAVENOUS at 12:22

## 2021-10-02 NOTE — PLAN OF CARE
Goal Outcome Evaluation:      Pt NSR. Pt had several large urine incontinences. PT alert. Unable to assess orientation.

## 2021-10-02 NOTE — PROGRESS NOTES
[facility] Medicine Services  PROGRESS NOTE    Patient Name: Rao Alcazar  : 1945  MRN: [id number]    Date of Admission: 2021  Primary Care Physician: Provider, No Known    Subjective   Subjective     CC:  F/u Fever and hypoxia    HPI:  Patient is indicates that he would like to eat.       ROS:  Unable to obtain due to mental status     Objective   Objective     Vital Signs:   Temp:  [97.2 °F (36.2 °C)-98.2 °F (36.8 °C)] 98 °F (36.7 °C)  Heart Rate:  [66-83] 74  Resp:  [16-18] 16  BP: (127-144)/(68-87) 127/72     Physical Exam:  Constitutional: No acute distress, awake, chronically ill appearing male   HENT: NCAT, mucous membranes moist  Respiratory: Clear to auscultation bilaterally, respiratory effort normal   Cardiovascular: RRR, no murmurs, rubs, or gallops  Gastrointestinal: Positive bowel sounds, soft, nontender, nondistended  Musculoskeletal: No bilateral ankle edema  Psychiatric: unable to assess   Neurologic: Opens eyes to stimuli   Skin: No rashes      Results Reviewed:  LAB RESULTS:      Lab 21  1205 21  0835 21  1043   WBC 3.85 6.12 7.01   HEMOGLOBIN 14.4 14.8 15.0   HEMATOCRIT 42.5 44.8 46.4   PLATELETS 187 215 234   NEUTROS ABS 1.79 4.45 5.03   IMMATURE GRANS (ABS) 0.01 0.05 0.02   LYMPHS ABS 1.24 1.03 1.20   MONOS ABS 0.53 0.49 0.67   EOS ABS 0.26 0.07 0.06   MCV 93.2 96.8 97.3*   CRP  --   --  20.06*   PROCALCITONIN  --   --  0.08         Lab 10/02/21  1534 21  1205 21  0720 21  0835 21  1043   SODIUM 137 138 140 141 141   POTASSIUM 3.8 3.7 3.6 3.9 3.5   CHLORIDE 100 97* 103 100 95*   CO2 32.0* 31.0* 26.0 23.0 31.0*   ANION GAP 5.0 10.0 11.0 18.0* 15.0   BUN 3* 4* 5* 9 14   CREATININE 0.38* 0.37* 0.64* 0.65* 0.77   GLUCOSE 115* 126* 155* 76 84   CALCIUM 8.9 8.8 8.7 8.8 8.9   MAGNESIUM  --  1.8  --  2.0 2.0   PHOSPHORUS  --   --   --  2.3* 3.1         Lab 21  1043   TOTAL PROTEIN 8.9*   ALBUMIN 3.90   GLOBULIN  5.0   ALT (SGPT) 6   AST (SGOT) 9   BILIRUBIN 0.3   ALK PHOS 159*                     Brief Urine Lab Results  (Last result in the past 365 days)      Color   Clarity   Blood   Leuk Est   Nitrite   Protein   CREAT   Urine HCG        09/24/21 1203 Yellow Clear Negative Negative Negative Negative               Microbiology Results Abnormal     Procedure Component Value - Date/Time    Blood Culture - Blood, Arm, Right [[id number]] Collected: 09/24/21 1150    Lab Status: Final result Specimen: Blood from Arm, Right Updated: 09/29/21 1231     Blood Culture No growth at 5 days    Blood Culture - Blood, Arm, Left [[id number]] Collected: 09/24/21 1150    Lab Status: Final result Specimen: Blood from Arm, Left Updated: 09/29/21 1231     Blood Culture No growth at 5 days    COVID PRE-OP / PRE-PROCEDURE SCREENING ORDER (NO ISOLATION) - Swab, Nasopharynx [[id number]]  (Normal) Collected: 09/24/21 1208    Lab Status: Final result Specimen: Swab from Nasopharynx Updated: 09/24/21 1236    Narrative:      The following orders were created for panel order COVID PRE-OP / PRE-PROCEDURE SCREENING ORDER (NO ISOLATION) - Swab, Nasopharynx.  Procedure                               Abnormality         Status                     ---------                               -----------         ------                     COVID-19 and FLU A/B PCR...[[id number]]  Normal              Final result                 Please view results for these tests on the individual orders.    COVID-19 and FLU A/B PCR - Swab, Nasopharynx [[id number]]  (Normal) Collected: 09/24/21 1208    Lab Status: Final result Specimen: Swab from Nasopharynx Updated: 09/24/21 1236     COVID19 Not Detected     Influenza A PCR Not Detected     Influenza B PCR Not Detected    Narrative:      Fact sheet for providers: https://www.fda.gov/media/446754/download    Fact sheet for patients: https://www.fda.gov/media/067158/download    Test performed by PCR.          No radiology results from the  last 24 hrs    Results for orders placed during the hospital encounter of 09/24/21    Adult Transthoracic Echo Complete W/ Cont if Necessary Per Protocol    Interpretation Summary  · Normal left ventricular systolic function, estimated EF 60%.  · Aortic sclerosis without aortic stenosis.  · Trace aortic insufficiency.  · Trace mitral regurgitation, trace tricuspid regurgitation, normal RVSP.      I have reviewed the medications:  Scheduled Meds:aspirin, 81 mg, Nasogastric, Daily   Or  aspirin, 300 mg, Rectal, Daily  atorvastatin, 10 mg, Nasogastric, Nightly  fosphenytoin, 100 mg PE, Intravenous, Q6H  heparin (porcine), 5,000 Units, Subcutaneous, Q8H  levETIRAcetam, 2,000 mg, Intravenous, Q12H  Levothyroxine Sodium, 20 mcg, Intravenous, Daily  palliative care oral rinse, , Mouth/Throat, TID - RT  PRO-STAT, 30 mL, Nasogastric, Daily  senna-docusate sodium, 2 tablet, Nasogastric, BID  sodium chloride, 10 mL, Intravenous, Q12H      Continuous Infusions:dextrose 5 % with KCl 20 mEq, 75 mL/hr, Last Rate: 75 mL/hr (10/01/21 3472)      PRN Meds:.•  acetaminophen  •  senna-docusate sodium **AND** polyethylene glycol **AND** bisacodyl **AND** bisacodyl  •  sodium chloride  •  sodium chloride    Assessment/Plan   Assessment & Plan     Active Hospital Problems    Diagnosis  POA   • Pneumonia of both lower lobes due to infectious organism [J18.9]  Yes   • Chronic diastolic CHF (congestive heart failure) (HCC) [I50.32]  Unknown   • Hyperlipidemia [E78.5]  Yes   • Mental disorder [F99]  Yes   • History of seizure disorder [Z86.69]  Not Applicable   • Cardiomegaly [I51.7]  Yes      Resolved Hospital Problems   No resolved problems to display.        Brief Hospital Course to date:  Rao Alcazar is a 76 y.o. male patient with severe intellectual disability, nonverbal at baseline (guardian of state), with history of dyslipidemia, seizure disorder and chronic hypoxic respiratory failure on home oxygen at 2 L/min presented to the  "hospital with fever and cough and was found to have bilateral basal pneumonia.     Assessment and plan:    This patient's problems and plans were partially entered by my partner and updated as appropriate by me 10/02/21.    [facility]   This patient suffers from chronic conditions such as seizure disorder, cognitive delay and heart failure. DNR/DNI is appropriate as this would only prolong suffering and would most likely not be successful.   This patient has been diagnosed with severe dysphagia. He has been receiving nutrition via an NG tube which is temporary. He has pulled at this tube and continues trying to push it out with his mouth. His sitter, whom knows him well, states that he \"lives to eat\". A comfort diet along with comfort measures would be most appropriate as a PEG placement and any further aggressive treatment would only prolong suffering. He is progressive in his chronic conditions with no improvement foreseen.     Bilateral community-acquired pneumonia vs    aspiration pneumonia  Dysphagia   -CT chest showed bilateral pulmonary infiltrates and pleural effusion, concerning for community acquired pneumonia  -MRSA swab is positive.  -zosyn completed   -NG removed, getting IVF        Seizure disorder  - Keppra and Dilantin  -trying to obtain home onfi   -Seizure precautions    Diastolic heart failure, not in decompensation  - echo EF 60%, aortic stenosis, normal RVSP   -Continue aspirin     Hyperlipidemia  · Continue statin     Severe intellectual disability   Nonverbal     Family Update  Waiting on state guardian to give permission for comfort measures.     DVT prophylaxis:  Medical DVT prophylaxis orders are present.       AM-PAC 6 Clicks Score (PT): 6 (10/02/21 0800)    Disposition: I expect the patient to be discharged to be determined    CODE STATUS:   Code Status and Medical Interventions:   Ordered at: 09/30/21 5439     Limited Support to NOT Include:    Intubation    Cardioversion/Defibrillation     " Code Status:    No CPR     Medical Interventions (Level of Support Prior to Arrest):    Limited       Sapphire Abbott,   10/02/21

## 2021-10-03 LAB
GLUCOSE BLDC GLUCOMTR-MCNC: 100 MG/DL (ref 70–130)
GLUCOSE BLDC GLUCOMTR-MCNC: 107 MG/DL (ref 70–130)
GLUCOSE BLDC GLUCOMTR-MCNC: 130 MG/DL (ref 70–130)
GLUCOSE BLDC GLUCOMTR-MCNC: 97 MG/DL (ref 70–130)

## 2021-10-03 PROCEDURE — 25010000002 HEPARIN (PORCINE) PER 1000 UNITS: Performed by: NURSE PRACTITIONER

## 2021-10-03 PROCEDURE — 25810000003 DEXTROSE 5 % WITH KCL 20 MEQ 20-5 MEQ/L-% SOLUTION: Performed by: NURSE PRACTITIONER

## 2021-10-03 PROCEDURE — 25010000002 FOSPHENYTOIN 100 MG PE/2ML SOLUTION 2 ML VIAL: Performed by: NURSE PRACTITIONER

## 2021-10-03 PROCEDURE — 82962 GLUCOSE BLOOD TEST: CPT

## 2021-10-03 PROCEDURE — 99231 SBSQ HOSP IP/OBS SF/LOW 25: CPT | Performed by: FAMILY MEDICINE

## 2021-10-03 PROCEDURE — 25010000002 LEVETRIRACETAM PER 10 MG: Performed by: NURSE PRACTITIONER

## 2021-10-03 RX ADMIN — MINERAL OIL: 1000 LIQUID ORAL at 08:26

## 2021-10-03 RX ADMIN — LEVETIRACETAM 2000 MG: 100 INJECTION, SOLUTION INTRAVENOUS at 20:05

## 2021-10-03 RX ADMIN — SODIUM CHLORIDE 100 MG PE: 9 INJECTION, SOLUTION INTRAVENOUS at 23:58

## 2021-10-03 RX ADMIN — LEVETIRACETAM 2000 MG: 100 INJECTION, SOLUTION INTRAVENOUS at 08:24

## 2021-10-03 RX ADMIN — SODIUM CHLORIDE 100 MG PE: 9 INJECTION, SOLUTION INTRAVENOUS at 17:37

## 2021-10-03 RX ADMIN — SODIUM CHLORIDE 100 MG PE: 9 INJECTION, SOLUTION INTRAVENOUS at 05:38

## 2021-10-03 RX ADMIN — LEVOTHYROXINE SODIUM 20 MCG: 20 INJECTION, SOLUTION INTRAVENOUS at 12:28

## 2021-10-03 RX ADMIN — MINERAL OIL: 1000 LIQUID ORAL at 16:01

## 2021-10-03 RX ADMIN — MINERAL OIL: 1000 LIQUID ORAL at 20:05

## 2021-10-03 RX ADMIN — HEPARIN SODIUM 5000 UNITS: 5000 INJECTION, SOLUTION INTRAVENOUS; SUBCUTANEOUS at 20:06

## 2021-10-03 RX ADMIN — HEPARIN SODIUM 5000 UNITS: 5000 INJECTION, SOLUTION INTRAVENOUS; SUBCUTANEOUS at 16:01

## 2021-10-03 RX ADMIN — POTASSIUM CHLORIDE AND DEXTROSE MONOHYDRATE 75 ML/HR: 150; 5 INJECTION, SOLUTION INTRAVENOUS at 19:11

## 2021-10-03 RX ADMIN — SODIUM CHLORIDE 100 MG PE: 9 INJECTION, SOLUTION INTRAVENOUS at 00:25

## 2021-10-03 RX ADMIN — SODIUM CHLORIDE, PRESERVATIVE FREE 10 ML: 5 INJECTION INTRAVENOUS at 20:06

## 2021-10-03 RX ADMIN — HEPARIN SODIUM 5000 UNITS: 5000 INJECTION, SOLUTION INTRAVENOUS; SUBCUTANEOUS at 05:38

## 2021-10-03 RX ADMIN — SODIUM CHLORIDE 100 MG PE: 9 INJECTION, SOLUTION INTRAVENOUS at 12:28

## 2021-10-03 RX ADMIN — SODIUM CHLORIDE, PRESERVATIVE FREE 10 ML: 5 INJECTION INTRAVENOUS at 08:26

## 2021-10-04 LAB
ALBUMIN SERPL-MCNC: 3.3 G/DL (ref 3.5–5.2)
ALP SERPL-CCNC: 134 U/L (ref 39–117)
ALT SERPL W P-5'-P-CCNC: 30 U/L (ref 1–41)
ANION GAP SERPL CALCULATED.3IONS-SCNC: 8 MMOL/L (ref 5–15)
AST SERPL-CCNC: 32 U/L (ref 1–40)
BASOPHILS # BLD AUTO: 0.03 10*3/MM3 (ref 0–0.2)
BASOPHILS NFR BLD AUTO: 0.7 % (ref 0–1.5)
BILIRUB SERPL-MCNC: 0.4 MG/DL (ref 0–1.2)
BUN SERPL-MCNC: 4 MG/DL (ref 8–23)
CALCIUM SPEC-SCNC: 8.8 MG/DL (ref 8.6–10.5)
CHLORIDE SERPL-SCNC: 99 MMOL/L (ref 98–107)
CHOLEST SERPL-MCNC: 177 MG/DL (ref 0–200)
CO2 SERPL-SCNC: 27 MMOL/L (ref 22–29)
CREAT SERPL-MCNC: 0.42 MG/DL (ref 0.76–1.27)
DEPRECATED RDW RBC AUTO: 43.5 FL (ref 37–54)
EOSINOPHIL # BLD AUTO: 0.34 10*3/MM3 (ref 0–0.4)
EOSINOPHIL NFR BLD AUTO: 7.9 % (ref 0.3–6.2)
ERYTHROCYTE [DISTWIDTH] IN BLOOD BY AUTOMATED COUNT: 12.6 % (ref 12.3–15.4)
GLUCOSE BLDC GLUCOMTR-MCNC: 102 MG/DL (ref 70–130)
GLUCOSE BLDC GLUCOMTR-MCNC: 89 MG/DL (ref 70–130)
GLUCOSE SERPL-MCNC: 101 MG/DL (ref 65–99)
HCT VFR BLD AUTO: 44.1 % (ref 37.5–51)
HGB BLD-MCNC: 14.9 G/DL (ref 13–17.7)
IMM GRANULOCYTES # BLD AUTO: 0.02 10*3/MM3 (ref 0–0.05)
IMM GRANULOCYTES NFR BLD AUTO: 0.5 % (ref 0–0.5)
LYMPHOCYTES # BLD AUTO: 1.28 10*3/MM3 (ref 0.7–3.1)
LYMPHOCYTES NFR BLD AUTO: 29.9 % (ref 19.6–45.3)
MAGNESIUM SERPL-MCNC: 1.9 MG/DL (ref 1.6–2.4)
MCH RBC QN AUTO: 31.4 PG (ref 26.6–33)
MCHC RBC AUTO-ENTMCNC: 33.8 G/DL (ref 31.5–35.7)
MCV RBC AUTO: 93 FL (ref 79–97)
MONOCYTES # BLD AUTO: 0.5 10*3/MM3 (ref 0.1–0.9)
MONOCYTES NFR BLD AUTO: 11.7 % (ref 5–12)
NEUTROPHILS NFR BLD AUTO: 2.11 10*3/MM3 (ref 1.7–7)
NEUTROPHILS NFR BLD AUTO: 49.3 % (ref 42.7–76)
NRBC BLD AUTO-RTO: 0 /100 WBC (ref 0–0.2)
PHOSPHATE SERPL-MCNC: 3 MG/DL (ref 2.5–4.5)
PLATELET # BLD AUTO: 227 10*3/MM3 (ref 140–450)
PMV BLD AUTO: 9.9 FL (ref 6–12)
POTASSIUM SERPL-SCNC: 3.8 MMOL/L (ref 3.5–5.2)
PROT SERPL-MCNC: 7.6 G/DL (ref 6–8.5)
RBC # BLD AUTO: 4.74 10*6/MM3 (ref 4.14–5.8)
SODIUM SERPL-SCNC: 134 MMOL/L (ref 136–145)
TRIGL SERPL-MCNC: 136 MG/DL (ref 0–150)
WBC # BLD AUTO: 4.28 10*3/MM3 (ref 3.4–10.8)

## 2021-10-04 PROCEDURE — 25010000002 FOSPHENYTOIN 100 MG PE/2ML SOLUTION 2 ML VIAL: Performed by: NURSE PRACTITIONER

## 2021-10-04 PROCEDURE — 85025 COMPLETE CBC W/AUTO DIFF WBC: CPT | Performed by: FAMILY MEDICINE

## 2021-10-04 PROCEDURE — 82962 GLUCOSE BLOOD TEST: CPT

## 2021-10-04 PROCEDURE — 99233 SBSQ HOSP IP/OBS HIGH 50: CPT | Performed by: INTERNAL MEDICINE

## 2021-10-04 PROCEDURE — 84478 ASSAY OF TRIGLYCERIDES: CPT

## 2021-10-04 PROCEDURE — 83735 ASSAY OF MAGNESIUM: CPT

## 2021-10-04 PROCEDURE — 25010000002 LEVETRIRACETAM PER 10 MG: Performed by: NURSE PRACTITIONER

## 2021-10-04 PROCEDURE — 25010000002 HEPARIN (PORCINE) PER 1000 UNITS: Performed by: NURSE PRACTITIONER

## 2021-10-04 PROCEDURE — 80053 COMPREHEN METABOLIC PANEL: CPT

## 2021-10-04 PROCEDURE — 92526 ORAL FUNCTION THERAPY: CPT

## 2021-10-04 PROCEDURE — 84100 ASSAY OF PHOSPHORUS: CPT

## 2021-10-04 PROCEDURE — 82465 ASSAY BLD/SERUM CHOLESTEROL: CPT

## 2021-10-04 RX ADMIN — SODIUM CHLORIDE 100 MG PE: 9 INJECTION, SOLUTION INTRAVENOUS at 18:06

## 2021-10-04 RX ADMIN — MINERAL OIL: 1000 LIQUID ORAL at 22:19

## 2021-10-04 RX ADMIN — LEVOTHYROXINE SODIUM 20 MCG: 20 INJECTION, SOLUTION INTRAVENOUS at 10:52

## 2021-10-04 RX ADMIN — LEVETIRACETAM 2000 MG: 100 INJECTION, SOLUTION INTRAVENOUS at 10:52

## 2021-10-04 RX ADMIN — LEVETIRACETAM 2000 MG: 100 INJECTION, SOLUTION INTRAVENOUS at 22:00

## 2021-10-04 RX ADMIN — SODIUM CHLORIDE 100 MG PE: 9 INJECTION, SOLUTION INTRAVENOUS at 04:54

## 2021-10-04 RX ADMIN — MINERAL OIL: 1000 LIQUID ORAL at 18:04

## 2021-10-04 RX ADMIN — HEPARIN SODIUM 5000 UNITS: 5000 INJECTION, SOLUTION INTRAVENOUS; SUBCUTANEOUS at 18:03

## 2021-10-04 RX ADMIN — HEPARIN SODIUM 5000 UNITS: 5000 INJECTION, SOLUTION INTRAVENOUS; SUBCUTANEOUS at 22:00

## 2021-10-04 RX ADMIN — HEPARIN SODIUM 5000 UNITS: 5000 INJECTION, SOLUTION INTRAVENOUS; SUBCUTANEOUS at 04:55

## 2021-10-04 RX ADMIN — LEVOTHYROXINE SODIUM 20 MCG: 20 INJECTION, SOLUTION INTRAVENOUS at 18:04

## 2021-10-04 NOTE — PROGRESS NOTES
[facility] Medicine Services  PROGRESS NOTE    Patient Name: Rao Alcazar  : 1945  MRN: [id number]    Date of Admission: 2021  Primary Care Physician: Provider, No Known    Subjective   Subjective     CC:  F/u Fever and hypoxia    HPI:  Patient is indicates that he would like to eat again today  No acute events over night.       ROS:  Unable to obtain due to mental status     Objective   Objective     Vital Signs:   Temp:  [96 °F (35.6 °C)-98.3 °F (36.8 °C)] 96.2 °F (35.7 °C)  Heart Rate:  [63-82] 63  Resp:  [16] 16  BP: (116-154)/(69-92) 139/71  Flow (L/min):  [2] 2     Physical Exam:  Constitutional: No acute distress, awake, chronically ill appearing male   HENT: NCAT, mucous membranes moist  Respiratory: Clear to auscultation bilaterally, respiratory effort normal   Cardiovascular: RRR, no murmurs, rubs, or gallops  Gastrointestinal: Positive bowel sounds, soft, nontender, nondistended  Musculoskeletal: No bilateral ankle edema  Psychiatric: unable to assess   Neurologic: Opens eyes to stimuli   Skin: No rashes      Results Reviewed:  LAB RESULTS:      Lab 21  1205 21  0835   WBC 3.85 6.12   HEMOGLOBIN 14.4 14.8   HEMATOCRIT 42.5 44.8   PLATELETS 187 215   NEUTROS ABS 1.79 4.45   IMMATURE GRANS (ABS) 0.01 0.05   LYMPHS ABS 1.24 1.03   MONOS ABS 0.53 0.49   EOS ABS 0.26 0.07   MCV 93.2 96.8         Lab 10/02/21  1534 21  1205 21  0720 21  0835   SODIUM 137 138 140 141   POTASSIUM 3.8 3.7 3.6 3.9   CHLORIDE 100 97* 103 100   CO2 32.0* 31.0* 26.0 23.0   ANION GAP 5.0 10.0 11.0 18.0*   BUN 3* 4* 5* 9   CREATININE 0.38* 0.37* 0.64* 0.65*   GLUCOSE 115* 126* 155* 76   CALCIUM 8.9 8.8 8.7 8.8   MAGNESIUM  --  1.8  --  2.0   PHOSPHORUS  --   --   --  2.3*                         Brief Urine Lab Results  (Last result in the past 365 days)      Color   Clarity   Blood   Leuk Est   Nitrite   Protein   CREAT   Urine HCG        21 1203 Yellow Clear  Negative Negative Negative Negative               Microbiology Results Abnormal     Procedure Component Value - Date/Time    Blood Culture - Blood, Arm, Right [[id number]] Collected: 09/24/21 1150    Lab Status: Final result Specimen: Blood from Arm, Right Updated: 09/29/21 1231     Blood Culture No growth at 5 days    Blood Culture - Blood, Arm, Left [[id number]] Collected: 09/24/21 1150    Lab Status: Final result Specimen: Blood from Arm, Left Updated: 09/29/21 1231     Blood Culture No growth at 5 days    COVID PRE-OP / PRE-PROCEDURE SCREENING ORDER (NO ISOLATION) - Swab, Nasopharynx [[id number]]  (Normal) Collected: 09/24/21 1208    Lab Status: Final result Specimen: Swab from Nasopharynx Updated: 09/24/21 1236    Narrative:      The following orders were created for panel order COVID PRE-OP / PRE-PROCEDURE SCREENING ORDER (NO ISOLATION) - Swab, Nasopharynx.  Procedure                               Abnormality         Status                     ---------                               -----------         ------                     COVID-19 and FLU A/B PCR...[[id number]]  Normal              Final result                 Please view results for these tests on the individual orders.    COVID-19 and FLU A/B PCR - Swab, Nasopharynx [[id number]]  (Normal) Collected: 09/24/21 1208    Lab Status: Final result Specimen: Swab from Nasopharynx Updated: 09/24/21 1236     COVID19 Not Detected     Influenza A PCR Not Detected     Influenza B PCR Not Detected    Narrative:      Fact sheet for providers: https://www.fda.gov/media/914105/download    Fact sheet for patients: https://www.fda.gov/media/357270/download    Test performed by PCR.          No radiology results from the last 24 hrs    Results for orders placed during the hospital encounter of 09/24/21    Adult Transthoracic Echo Complete W/ Cont if Necessary Per Protocol    Interpretation Summary  · Normal left ventricular systolic function, estimated EF 60%.  · Aortic  sclerosis without aortic stenosis.  · Trace aortic insufficiency.  · Trace mitral regurgitation, trace tricuspid regurgitation, normal RVSP.      I have reviewed the medications:  Scheduled Meds:aspirin, 81 mg, Nasogastric, Daily   Or  aspirin, 300 mg, Rectal, Daily  atorvastatin, 10 mg, Nasogastric, Nightly  fosphenytoin, 100 mg PE, Intravenous, Q6H  heparin (porcine), 5,000 Units, Subcutaneous, Q8H  levETIRAcetam, 2,000 mg, Intravenous, Q12H  Levothyroxine Sodium, 20 mcg, Intravenous, Daily  palliative care oral rinse, , Mouth/Throat, TID - RT  PRO-STAT, 30 mL, Nasogastric, Daily  senna-docusate sodium, 2 tablet, Nasogastric, BID  sodium chloride, 10 mL, Intravenous, Q12H      Continuous Infusions:dextrose 5 % with KCl 20 mEq, 75 mL/hr, Last Rate: 75 mL/hr (10/03/21 1911)      PRN Meds:.•  acetaminophen  •  senna-docusate sodium **AND** polyethylene glycol **AND** bisacodyl **AND** bisacodyl  •  sodium chloride  •  sodium chloride    Assessment/Plan   Assessment & Plan     Active Hospital Problems    Diagnosis  POA   • Pneumonia of both lower lobes due to infectious organism [J18.9]  Yes   • Chronic diastolic CHF (congestive heart failure) (HCC) [I50.32]  Unknown   • Hyperlipidemia [E78.5]  Yes   • Mental disorder [F99]  Yes   • History of seizure disorder [Z86.69]  Not Applicable   • Cardiomegaly [I51.7]  Yes      Resolved Hospital Problems   No resolved problems to display.        Brief Hospital Course to date:  Rao Alcazar is a 76 y.o. male patient with severe intellectual disability, nonverbal at baseline (guardian of state), with history of dyslipidemia, seizure disorder and chronic hypoxic respiratory failure on home oxygen at 2 L/min presented to the hospital with fever and cough and was found to have bilateral basal pneumonia.     Assessment and plan:    This patient's problems and plans were partially entered by my partner and updated as appropriate by me 10/03/21.    [facility]   This patient suffers from  "chronic conditions such as seizure disorder, cognitive delay and heart failure. DNR/DNI is appropriate as this would only prolong suffering and would most likely not be successful.   This patient has been diagnosed with severe dysphagia. He has been receiving nutrition via an NG tube which is temporary. He has pulled at this tube and continues trying to push it out with his mouth. His sitter, whom knows him well, states that he \"lives to eat\". A comfort diet along with comfort measures would be most appropriate as a PEG placement and any further aggressive treatment would only prolong suffering. He is progressive in his chronic conditions with no improvement foreseen.     Bilateral community-acquired pneumonia vs    aspiration pneumonia  Dysphagia   -CT chest showed bilateral pulmonary infiltrates and pleural effusion, concerning for community acquired pneumonia  -MRSA swab is positive.  -zosyn completed   -NG removed, getting IVF        Seizure disorder  - Keppra and Dilantin  -trying to obtain home onfi   -Seizure precautions    Diastolic heart failure, not in decompensation  - echo EF 60%, aortic stenosis, normal RVSP   -Continue aspirin     Hyperlipidemia  · Continue statin     Severe intellectual disability   Nonverbal     Family Update  Waiting on state guardian to give permission for comfort measures.     DVT prophylaxis:  Medical DVT prophylaxis orders are present.       AM-PAC 6 Clicks Score (PT): 6 (10/02/21 0800)    Disposition: I expect the patient to be discharged to be determined    CODE STATUS:   Code Status and Medical Interventions:   Ordered at: 09/30/21 2033     Limited Support to NOT Include:    Intubation    Cardioversion/Defibrillation     Code Status:    No CPR     Medical Interventions (Level of Support Prior to Arrest):    Limited       Sapphire Abbott, DO  10/03/21              "

## 2021-10-04 NOTE — PROGRESS NOTES
"                    Clinical Nutrition     Patient Name: Rao Alcazar  YOB: 1945  MRN: [id number]  Date of Encounter: 10/04/21 14:53 EDT  Admission date: 9/24/2021    Reason for Visit   NPO x 3 days    EMR  Reviewed   Yes  - SLP following; MBS on 9/27 - failed.  - Palliative following; POC/GOC pending    - Per MD note \"Most currently this patient has been diagnosed with severe dysphagia. He has been receiving nutrition via an NG tube which is temporary but he has pulled at this tube and continues trying to push it out with his mouth. His sitter, whom knows him well, states that he \"lives to eat\". A comfort diet along with comfort measures would be most appropriate as a PEG placement and any further aggressive treatment would only prolong suffering. He is progressive in his chronic conditions with no improvement foreseen. Awaiting guardian decision regarding this.\"    - dextrose 5% with KCl 20 mEq/L infusion at 75 ml/hr     Reported/Observed/Food/Nutrition Related - Comments     Pt sleeping at time of RD visit. No EN access for TF at this time (NG pulled). LBM 10/2.     Current Nutrition Prescription     NPO Diet    Intake: NPO    Actions     Follow treatment progress, Care plan reviewed     - establish nutrition source within 24-48 hrs    Monitor Per Protocol    Jimmy Chacon, CATRACHITA  Time Spent: 15 min        "

## 2021-10-04 NOTE — PLAN OF CARE
Problem: Palliative Care  Goal: Enhanced Quality of Life  Intervention: Optimize Psychosocial Wellbeing  Recent Flowsheet Documentation  Taken 10/4/2021 1208 by Zoe Carrillo, MSW  Supportive Measures: (symptom assessment) other (see comments)  Patient sleeping, appears to resting calmly and quietly, nonverbal indicators of pain/distress absent.  Awaiting decision on end of life care.    1300 Palliative IDT - Palliative Team members present:  UQITA Vázquez DO; CONNIE Shafer APRN; EDWARDO Bridges RN, CHPN; NEHEMIAH Otto RN, CHPN; [facility]UNIOR [facility]; SAY Villela RN, MILES; Hospice CM CELIA George RN, CHPN

## 2021-10-04 NOTE — PROGRESS NOTES
[facility] Medicine Services  PROGRESS NOTE    Patient Name: Rao Alcazar  : 1945  MRN: [id number]    Date of Admission: 2021  Primary Care Physician: Provider, No Known    Subjective   Subjective     CC: f/u dysphagia    HPI: Up in bed awake. Nods head no that nothing is bothering him.    ROS:  UTO due to AMS.     Objective   Objective     Vital Signs:   Temp:  [96.2 °F (35.7 °C)-98.2 °F (36.8 °C)] 97.9 °F (36.6 °C)  Heart Rate:  [63-70] 65  Resp:  [16-18] 18  BP: (104-142)/(64-77) 118/69  Flow (L/min):  [2] 2     Physical Exam:  Constitutional: No acute distress, awake, alert  HENT: NCAT, mucous membranes moist  Respiratory: Clear to auscultation bilaterally, respiratory effort normal   Cardiovascular: RRR, no murmurs, rubs, or gallops  Gastrointestinal: Positive bowel sounds, soft, nontender, nondistended  Musculoskeletal: No bilateral ankle edema  Psychiatric: ADDIE  Neurologic: ADDIE  Skin: No rashes    Results Reviewed:  LAB RESULTS:      Lab 10/04/21  0653 21  1205   WBC 4.28 3.85   HEMOGLOBIN 14.9 14.4   HEMATOCRIT 44.1 42.5   PLATELETS 227 187   NEUTROS ABS 2.11 1.79   IMMATURE GRANS (ABS) 0.02 0.01   LYMPHS ABS 1.28 1.24   MONOS ABS 0.50 0.53   EOS ABS 0.34 0.26   MCV 93.0 93.2         Lab 10/04/21  0653 10/02/21  1534 21  1205 21  0720   SODIUM 134* 137 138 140   POTASSIUM 3.8 3.8 3.7 3.6   CHLORIDE 99 100 97* 103   CO2 27.0 32.0* 31.0* 26.0   ANION GAP 8.0 5.0 10.0 11.0   BUN 4* 3* 4* 5*   CREATININE 0.42* 0.38* 0.37* 0.64*   GLUCOSE 101* 115* 126* 155*   CALCIUM 8.8 8.9 8.8 8.7   MAGNESIUM 1.9  --  1.8  --    PHOSPHORUS 3.0  --   --   --          Lab 10/04/21  0653   TOTAL PROTEIN 7.6   ALBUMIN 3.30*   ALT (SGPT) 30   AST (SGOT) 32   BILIRUBIN 0.4   ALK PHOS 134*             Lab 10/04/21  0653   CHOLESTEROL 177   TRIGLYCERIDES 136             Brief Urine Lab Results  (Last result in the past 365 days)      Color   Clarity   Blood   Leuk Est    Nitrite   Protein   CREAT   Urine HCG        09/24/21 1203 Yellow Clear Negative Negative Negative Negative               Microbiology Results Abnormal     Procedure Component Value - Date/Time    Blood Culture - Blood, Arm, Right [[id number]] Collected: 09/24/21 1150    Lab Status: Final result Specimen: Blood from Arm, Right Updated: 09/29/21 1231     Blood Culture No growth at 5 days    Blood Culture - Blood, Arm, Left [[id number]] Collected: 09/24/21 1150    Lab Status: Final result Specimen: Blood from Arm, Left Updated: 09/29/21 1231     Blood Culture No growth at 5 days    COVID PRE-OP / PRE-PROCEDURE SCREENING ORDER (NO ISOLATION) - Swab, Nasopharynx [[id number]]  (Normal) Collected: 09/24/21 1208    Lab Status: Final result Specimen: Swab from Nasopharynx Updated: 09/24/21 1236    Narrative:      The following orders were created for panel order COVID PRE-OP / PRE-PROCEDURE SCREENING ORDER (NO ISOLATION) - Swab, Nasopharynx.  Procedure                               Abnormality         Status                     ---------                               -----------         ------                     COVID-19 and FLU A/B PCR...[[id number]]  Normal              Final result                 Please view results for these tests on the individual orders.    COVID-19 and FLU A/B PCR - Swab, Nasopharynx [[id number]]  (Normal) Collected: 09/24/21 1208    Lab Status: Final result Specimen: Swab from Nasopharynx Updated: 09/24/21 1236     COVID19 Not Detected     Influenza A PCR Not Detected     Influenza B PCR Not Detected    Narrative:      Fact sheet for providers: https://www.fda.gov/media/678250/download    Fact sheet for patients: https://www.fda.gov/media/486030/download    Test performed by PCR.        CXR personally reviewed showing bilateral opacities. Agree with interpretation.    Results for orders placed during the hospital encounter of 09/24/21    Adult Transthoracic Echo Complete W/ Cont if Necessary Per  Protocol    Interpretation Summary  · Normal left ventricular systolic function, estimated EF 60%.  · Aortic sclerosis without aortic stenosis.  · Trace aortic insufficiency.  · Trace mitral regurgitation, trace tricuspid regurgitation, normal RVSP.      I have reviewed the medications:  Scheduled Meds:aspirin, 81 mg, Nasogastric, Daily   Or  aspirin, 300 mg, Rectal, Daily  atorvastatin, 10 mg, Nasogastric, Nightly  fosphenytoin, 100 mg PE, Intravenous, Q6H  heparin (porcine), 5,000 Units, Subcutaneous, Q8H  levETIRAcetam, 2,000 mg, Intravenous, Q12H  Levothyroxine Sodium, 20 mcg, Intravenous, Daily  palliative care oral rinse, , Mouth/Throat, TID - RT  PRO-STAT, 30 mL, Nasogastric, Daily  senna-docusate sodium, 2 tablet, Nasogastric, BID  sodium chloride, 10 mL, Intravenous, Q12H      Continuous Infusions:dextrose 5 % with KCl 20 mEq, 75 mL/hr, Last Rate: 75 mL/hr (10/03/21 1911)      PRN Meds:.•  acetaminophen  •  senna-docusate sodium **AND** polyethylene glycol **AND** bisacodyl **AND** bisacodyl  •  sodium chloride  •  sodium chloride    Assessment/Plan   Assessment & Plan     Active Hospital Problems    Diagnosis  POA   • Pneumonia of both lower lobes due to infectious organism [J18.9]  Yes   • Chronic diastolic CHF (congestive heart failure) (HCC) [I50.32]  Unknown   • Hyperlipidemia [E78.5]  Yes   • Mental disorder [F99]  Yes   • History of seizure disorder [Z86.69]  Not Applicable   • Cardiomegaly [I51.7]  Yes      Resolved Hospital Problems   No resolved problems to display.        Brief Hospital Course to date:  Rao Alcazar is a 76 y.o. male patient with severe intellectual disability, nonverbal at baseline (guardian of state), with history of dyslipidemia, seizure disorder and chronic hypoxic respiratory failure on home oxygen at 2 L/min presented to the hospital with fever and cough and was found to have bilateral basal pneumonia. This is my first day assessing patient's active medical  "issues.     GOC  --My partner has d/w patient's family. This patient suffers from chronic conditions such as seizure disorder, cognitive delay and heart failure. DNR/DNI is appropriate as this would only prolong suffering and would most likely not be successful - guardian in agreement - changed to DNR/DNI.  --Most currently this patient has been diagnosed with severe dysphagia. He has been receiving nutrition via an NG tube which is temporary but he has pulled at this tube and continues trying to push it out with his mouth. His sitter, whom knows him well, states that he \"lives to eat\". A comfort diet along with comfort measures would be most appropriate as a PEG placement and any further aggressive treatment would only prolong suffering. He is progressive in his chronic conditions with no improvement foreseen. Awaiting guardian decision regarding this.     Aspiration pneumonia  Dysphagia   -CT chest showed bilateral pulmonary infiltrates and pleural effusion, concerning for community acquired pneumonia  -Has completed course of IV abx.     Seizure disorder  -Keppra and Dilantin  -Seizure precautions     Diastolic heart failure, not in decompensation  -echo EF 60%, aortic stenosis, normal RVSP   -Compensated. Continue aspirin     Hyperlipidemia  -Continue statin     Severe intellectual disability   Nonverbal      This patient's problems and plans were partially entered by my partner and updated as appropriate by me 10/04/21.    DVT prophylaxis:  Medical DVT prophylaxis orders are present.       AM-PAC 6 Clicks Score (PT): 6 (10/03/21 2010)    Disposition: I expect the patient to be discharged TBD.    CODE STATUS:   Code Status and Medical Interventions:   Ordered at: 09/30/21 0676     Limited Support to NOT Include:    Intubation    Cardioversion/Defibrillation     Code Status:    No CPR     Medical Interventions (Level of Support Prior to Arrest):    Yasmin Red II, DO  10/04/21            "

## 2021-10-04 NOTE — PLAN OF CARE
Goal Outcome Evaluation:     SLP treatment completed. Will continue to address dysphagia as appropriate pending goals of care decisions. Please see note for further details and recommendations.

## 2021-10-04 NOTE — THERAPY TREATMENT NOTE
Acute Care - Speech Language Pathology   Swallow Treatment Note [facility]     Patient Name: Rao Alcazar  : 1945  MRN: [id number]  Today's Date: 10/4/2021               Admit Date: 2021    Visit Dx:     ICD-10-CM ICD-9-CM   1. Pneumonia of both lower lobes due to infectious organism  J18.9 486   2. Acute on chronic respiratory failure with hypoxia (HCC)  J96.21 518.84     799.02   3. Oropharyngeal dysphagia  R13.12 787.22     Patient Active Problem List   Diagnosis   • Atypical chest pain   • Cardiomegaly   • Congestive heart failure (HCC)   • Shortness of breath   • Cardiac mass   • Chest pain   • Mental disorder   • History of panic attacks   • History of seizure disorder   • Mental retardation   • Hyperlipidemia   • Pneumonia of both lower lobes due to infectious organism   • Chronic diastolic CHF (congestive heart failure) (HCC)     Past Medical History:   Diagnosis Date   • Atypical chest pain    • Cardiomegaly    • CHF (congestive heart failure) (CMS/HCC)    • History of panic attacks    • History of seizure disorder    • Mental disorder    • Mental retardation    • Panic attacks    • Seizures (CMS/HCC)      Past Surgical History:   Procedure Laterality Date   • EXPLORATORY LAPAROTOMY         SLP Recommendation and Plan  SLP Swallowing Diagnosis: mod-severe, oral dysphagia, severe, pharyngeal dysphagia (10/04/21 113)  SLP Diet Recommendation: NPO, temporary alternate methods of nutrition/hydration, long term alternate methods of nutrition/hydration, other (see comments) (10/04/21 113)  Recommended Precautions and Strategies: general aspiration precautions (10/04/21 113)  SLP Rec. for Method of Medication Administration: meds via alternate route (10/04/21 113)     Monitor for Signs of Aspiration: yes, notify SLP if any concerns (10/04/21 113)     Swallow Criteria for Skilled Therapeutic Interventions Met: demonstrates skilled criteria (10/04/21 1130)  Anticipated Discharge Disposition  (SLP): unknown, anticipate therapy at next level of care (10/04/21 1130)  Rehab Potential/Prognosis, Swallowing: adequate, monitor progress closely (10/04/21 1130)  Therapy Frequency (Swallow): PRN (10/04/21 1130)  Predicted Duration Therapy Intervention (Days): until discharge (10/04/21 1130)     Daily Summary of Progress (SLP): progress toward functional goals as expected (10/04/21 1130)    Plan for Continued Treatment (SLP): Patient continues with overt clinical s/s of aspiration with trials of ice, thin liquids and NT liquid trials (10/04/21 1130)                        SWALLOW EVALUATION (last 72 hours)      SLP Adult Swallow Evaluation     Row Name 10/04/21 1130 10/01/21 1440                Rehab Evaluation    Document Type  therapy note (daily note)  -CH  therapy note (daily note)  -EN       Subjective Information  no complaints  -CH  no complaints  -EN       Patient Observations  alert;cooperative  -CH  cooperative  -EN       Patient/Family/Caregiver Comments/Observations  No family present  -CH  no family present   -EN       Care Plan Review  evaluation/treatment results reviewed;care plan/treatment goals reviewed;risks/benefits reviewed;current/potential barriers reviewed;patient/other agree to care plan  -CH  evaluation/treatment results reviewed;care plan/treatment goals reviewed;risks/benefits reviewed;current/potential barriers reviewed;patient/other agree to care plan  -EN       Patient Effort  adequate  -CH  adequate  -EN       Symptoms Noted During/After Treatment  none  -CH  none  -EN          General Information    Patient Profile Reviewed  yes  -CH  yes  -EN          Pain    Additional Documentation  Pain Scale: FACES Pre/Post-Treatment (Group)  -CH  Pain Scale: FACES Pre/Post-Treatment (Group)  -EN          Pain Scale: FACES Pre/Post-Treatment    Pain: FACES Scale, Pretreatment  0-->no hurt  -CH  0-->no hurt  -EN       Posttreatment Pain Rating  0-->no hurt  -CH  0-->no hurt  -EN           Clinical Impression    Daily Summary of Progress (SLP)  progress toward functional goals as expected  -CH  progress toward functional goals as expected  -EN       SLP Swallowing Diagnosis  mod-severe;oral dysphagia;severe;pharyngeal dysphagia  -CH  --       Functional Impact  risk of aspiration/pneumonia;risk of malnutrition;risk of dehydration  -CH  risk of aspiration/pneumonia;risk of malnutrition;risk of dehydration  -EN       Rehab Potential/Prognosis, Swallowing  adequate, monitor progress closely  -CH  adequate, monitor progress closely  -EN       Swallow Criteria for Skilled Therapeutic Interventions Met  demonstrates skilled criteria  -CH  demonstrates skilled criteria  -EN       Plan for Continued Treatment (SLP)  Patient continues with overt clinical s/s of aspiration with trials of ice, thin liquids and NT liquid trials  -CH  Continued s/s of aspiration during all trials of thin liquids and ice chips this date. Wet vocal quality and coughing present throughout. Safest recommendation continues to be NPO w/ alternate route of nutrition. Continue to follow.   -EN          Recommendations    Therapy Frequency (Swallow)  PRN  -CH  PRN  -EN       Predicted Duration Therapy Intervention (Days)  until discharge  -CH  until discharge  -EN       SLP Diet Recommendation  NPO;temporary alternate methods of nutrition/hydration;long term alternate methods of nutrition/hydration;other (see comments)  -  NPO;temporary alternate methods of nutrition/hydration;long term alternate methods of nutrition/hydration;other (see comments) per MD discretion  -EN       Recommended Precautions and Strategies  general aspiration precautions  -  general aspiration precautions  -EN       Oral Care Recommendations  Oral Care BID/PRN;Suction toothbrush  -CH  Oral Care BID/PRN;Suction toothbrush  -EN       SLP Rec. for Method of Medication Administration  meds via alternate route  -CH  meds via alternate route  -EN       Monitor for  Signs of Aspiration  yes;notify SLP if any concerns  -CH  yes;notify SLP if any concerns  -EN       Anticipated Discharge Disposition (SLP)  unknown;anticipate therapy at next level of care  -CH  unknown;anticipate therapy at next level of care  -EN         User Key  (r) = Recorded By, (t) = Taken By, (c) = Cosigned By    Initials Name Effective Dates    CH Radha Kapoor, MS CCC-SLP 06/16/21 -     EN Radhika Resendez MS, CFY-SLP 05/20/21 -           EDUCATION  The patient has been educated in the following areas:   Dysphagia (Swallowing Impairment) Oral Care/Hydration NPO rationale.       SLP GOALS     Row Name 10/04/21 1300 10/01/21 1440          Oral Nutrition/Hydration Goal 1 (SLP)    Oral Nutrition/Hydration Goal 1, SLP  LTG: Pt will return to PO diet w/o s/s of aspiration w/ 100% accuracy w/o cues  -CH  LTG: Pt will return to PO diet w/o s/s of aspiration w/ 100% accuracy w/o cues  -EN     Time Frame (Oral Nutrition/Hydration Goal 1, SLP)  by discharge  -CH  by discharge  -EN     Progress/Outcomes (Oral Nutrition/Hydration Goal 1, SLP)  progress slower than expected  -CH  progress slower than expected  -EN        Oral Nutrition/Hydration Goal 2 (SLP)    Oral Nutrition/Hydration Goal 2, SLP  Pt will tolerate therapeutic H2O trials w/o s/s of aspiration w/ 60% accuracy to indicate readiness for repeat instrumental eval.  -CH  Pt will tolerate therapeutic H2O trials w/o s/s of aspiration w/ 60% accuracy to indicate readiness for repeat instrumental eval.  -EN     Time Frame (Oral Nutrition/Hydration Goal 2, SLP)  short term goal (STG)  -CH  short term goal (STG)  -EN     Barriers (Oral Nutrition/Hydration Goal 2, SLP)  Continued overt s/s of aspiration w/ therapeutic ice, thin and nectar thick liquid trials c/b wet vocal quality & coughing w/ suctioning. Not ready for repeat instrumental eval at this time  -  --     Progress/Outcomes (Oral Nutrition/Hydration Goal 2, SLP)  progress slower than expected  -   progress slower than expected  -EN       User Key  (r) = Recorded By, (t) = Taken By, (c) = Cosigned By    Initials Name Provider Type    Radha Savage MS CCC-SLP Speech and Language Pathologist    Radhika Madison MS, CFY-SLP Speech and Language Pathologist             Time Calculation:   Time Calculation- SLP     Row Name 10/04/21 1306             Time Calculation- SLP    SLP Start Time  1130  -CH      SLP Received On  10/04/21  -CH         Untimed Charges    15190-FV Treatment Swallow Minutes  33  -CH         Total Minutes    Untimed Charges Total Minutes  33  -CH       Total Minutes  33  -CH        User Key  (r) = Recorded By, (t) = Taken By, (c) = Cosigned By    Initials Name Provider Type    Radha Savage MS CCC-SLP Speech and Language Pathologist          Therapy Charges for Today     Code Description Service Date Service Provider Modifiers Qty    [id number]  ST TREATMENT SWALLOW 2 10/4/2021 Radha Kapoor MS CCC-SLP GN 1               Radha Kapoor MS CCC-SLP  10/4/2021       Patient was not wearing a face mask and did exhibit coughing during this therapy encounter.  Procedure performed was aerosolizing, involved close contact (within 6 feet for at least 15 minutes or longer), and did not involve contact with infectious secretions or specimens.  Therapist used appropriate personal protective equipment including gloves, standard procedure mask and eye protection.  Appropriate PPE was worn during the entire therapy session.  Hand hygiene was completed before and after therapy session.

## 2021-10-04 NOTE — CASE MANAGEMENT/SOCIAL WORK
Continued Stay Note  [facility]     Patient Name: Rao Alcazar  MRN: [id number]  Today's Date: 10/4/2021    Admit Date: 9/24/2021    Discharge Plan     Row Name 10/04/21 1638       Plan    Plan Comments  Pt is able to go back to his home with caregivers once plan of care determined after family's guardianship decision. MSW continues to follow for discharge eneds and will coordinate with [place], the medical coordinator once plan of care is determined if pt's family pursuing guardianship or if pt will remain with a state guardian.        Discharge Codes    No documentation.       Expected Discharge Date and Time     Expected Discharge Date Expected Discharge Time    Oct 1, 2021             CHELSEY Jurado

## 2021-10-05 LAB
GLUCOSE BLDC GLUCOMTR-MCNC: 116 MG/DL (ref 70–130)
GLUCOSE BLDC GLUCOMTR-MCNC: 84 MG/DL (ref 70–130)
GLUCOSE BLDC GLUCOMTR-MCNC: 91 MG/DL (ref 70–130)
GLUCOSE BLDC GLUCOMTR-MCNC: 91 MG/DL (ref 70–130)
GLUCOSE BLDC GLUCOMTR-MCNC: 99 MG/DL (ref 70–130)

## 2021-10-05 PROCEDURE — 25010000002 LEVETRIRACETAM PER 10 MG: Performed by: NURSE PRACTITIONER

## 2021-10-05 PROCEDURE — 82962 GLUCOSE BLOOD TEST: CPT

## 2021-10-05 PROCEDURE — 25010000002 FOSPHENYTOIN 100 MG PE/2ML SOLUTION 2 ML VIAL: Performed by: NURSE PRACTITIONER

## 2021-10-05 PROCEDURE — 99232 SBSQ HOSP IP/OBS MODERATE 35: CPT | Performed by: NURSE PRACTITIONER

## 2021-10-05 PROCEDURE — 25010000002 HEPARIN (PORCINE) PER 1000 UNITS: Performed by: NURSE PRACTITIONER

## 2021-10-05 PROCEDURE — 92526 ORAL FUNCTION THERAPY: CPT

## 2021-10-05 RX ADMIN — ATORVASTATIN CALCIUM 10 MG: 10 TABLET, FILM COATED ORAL at 20:13

## 2021-10-05 RX ADMIN — SODIUM CHLORIDE 100 MG PE: 9 INJECTION, SOLUTION INTRAVENOUS at 12:36

## 2021-10-05 RX ADMIN — SODIUM CHLORIDE 100 MG PE: 9 INJECTION, SOLUTION INTRAVENOUS at 00:21

## 2021-10-05 RX ADMIN — SODIUM CHLORIDE, PRESERVATIVE FREE 10 ML: 5 INJECTION INTRAVENOUS at 20:13

## 2021-10-05 RX ADMIN — HEPARIN SODIUM 5000 UNITS: 5000 INJECTION, SOLUTION INTRAVENOUS; SUBCUTANEOUS at 20:13

## 2021-10-05 RX ADMIN — SODIUM CHLORIDE 100 MG PE: 9 INJECTION, SOLUTION INTRAVENOUS at 23:32

## 2021-10-05 RX ADMIN — HEPARIN SODIUM 5000 UNITS: 5000 INJECTION, SOLUTION INTRAVENOUS; SUBCUTANEOUS at 17:52

## 2021-10-05 RX ADMIN — MINERAL OIL: 1000 LIQUID ORAL at 20:12

## 2021-10-05 RX ADMIN — LEVETIRACETAM 2000 MG: 100 INJECTION, SOLUTION INTRAVENOUS at 20:12

## 2021-10-05 RX ADMIN — LEVETIRACETAM 2000 MG: 100 INJECTION, SOLUTION INTRAVENOUS at 10:46

## 2021-10-05 RX ADMIN — LEVOTHYROXINE SODIUM 20 MCG: 20 INJECTION, SOLUTION INTRAVENOUS at 12:37

## 2021-10-05 RX ADMIN — DOCUSATE SODIUM 50 MG AND SENNOSIDES 8.6 MG 2 TABLET: 8.6; 5 TABLET, FILM COATED ORAL at 20:13

## 2021-10-05 RX ADMIN — SODIUM CHLORIDE 100 MG PE: 9 INJECTION, SOLUTION INTRAVENOUS at 18:26

## 2021-10-05 RX ADMIN — SODIUM CHLORIDE 100 MG PE: 9 INJECTION, SOLUTION INTRAVENOUS at 05:00

## 2021-10-05 RX ADMIN — MINERAL OIL: 1000 LIQUID ORAL at 10:47

## 2021-10-05 RX ADMIN — HEPARIN SODIUM 5000 UNITS: 5000 INJECTION, SOLUTION INTRAVENOUS; SUBCUTANEOUS at 05:00

## 2021-10-05 RX ADMIN — MINERAL OIL: 1000 LIQUID ORAL at 17:53

## 2021-10-05 NOTE — THERAPY TREATMENT NOTE
Acute Care - Speech Language Pathology   Swallow Treatment Note [facility]     Patient Name: Rao Alcazar  : 1945  MRN: [id number]  Today's Date: 10/5/2021               Admit Date: 2021    Visit Dx:     ICD-10-CM ICD-9-CM   1. Pneumonia of both lower lobes due to infectious organism  J18.9 486   2. Acute on chronic respiratory failure with hypoxia (HCC)  J96.21 518.84     799.02   3. Oropharyngeal dysphagia  R13.12 787.22     Patient Active Problem List   Diagnosis   • Atypical chest pain   • Cardiomegaly   • Congestive heart failure (HCC)   • Shortness of breath   • Cardiac mass   • Chest pain   • Mental disorder   • History of panic attacks   • History of seizure disorder   • Mental retardation   • Hyperlipidemia   • Pneumonia of both lower lobes due to infectious organism   • Chronic diastolic CHF (congestive heart failure) (HCC)     Past Medical History:   Diagnosis Date   • Atypical chest pain    • Cardiomegaly    • CHF (congestive heart failure) (CMS/HCC)    • History of panic attacks    • History of seizure disorder    • Mental disorder    • Mental retardation    • Panic attacks    • Seizures (CMS/HCC)      Past Surgical History:   Procedure Laterality Date   • EXPLORATORY LAPAROTOMY         SLP Recommendation and Plan  SLP Swallowing Diagnosis: mod-severe, oral dysphagia, severe, pharyngeal dysphagia (10/05/21 1300)  SLP Diet Recommendation: NPO, temporary alternate methods of nutrition/hydration, long term alternate methods of nutrition/hydration (10/05/21 1300)  Recommended Precautions and Strategies: general aspiration precautions (10/05/21 1300)  SLP Rec. for Method of Medication Administration: meds via alternate route (10/05/21 1300)     Monitor for Signs of Aspiration: yes, notify SLP if any concerns (10/05/21 1300)     Swallow Criteria for Skilled Therapeutic Interventions Met: demonstrates skilled criteria (10/05/21 1300)  Anticipated Discharge Disposition (SLP): unknown,  anticipate therapy at next level of care (10/05/21 1300)  Rehab Potential/Prognosis, Swallowing: adequate, monitor progress closely (10/05/21 1300)  Therapy Frequency (Swallow): PRN (10/05/21 1300)  Predicted Duration Therapy Intervention (Days): until discharge (10/05/21 1300)     Daily Summary of Progress (SLP): progress toward functional goals as expected (10/05/21 1300)    Plan for Continued Treatment (SLP): Patient displayed delayed cough following trials of ice chips and immediate cough following thin H2O via teaspoon. Multiple swallows with pureed trials. Continue to follow for dysphagia as appropriate pending goals of care decisions.  (10/05/21 1300)                        SWALLOW EVALUATION (last 72 hours)      SLP Adult Swallow Evaluation     Row Name 10/05/21 1300 10/04/21 1130                Rehab Evaluation    Document Type  therapy note (daily note)  -CH  therapy note (daily note)  -       Subjective Information  no complaints  -  no complaints  -       Patient Observations  alert;cooperative;agree to therapy  -  alert;cooperative  -       Patient/Family/Caregiver Comments/Observations  No family present  -  No family present  -       Care Plan Review  evaluation/treatment results reviewed;care plan/treatment goals reviewed;risks/benefits reviewed;current/potential barriers reviewed;patient/other agree to care plan  -  evaluation/treatment results reviewed;care plan/treatment goals reviewed;risks/benefits reviewed;current/potential barriers reviewed;patient/other agree to care plan  -       Patient Effort  adequate  -CH  adequate  -CH       Symptoms Noted During/After Treatment  none  -CH  none  -CH          General Information    Patient Profile Reviewed  yes  -CH  yes  -CH          Pain    Additional Documentation  Pain Scale: FACES Pre/Post-Treatment (Group)  -CH  Pain Scale: FACES Pre/Post-Treatment (Group)  -CH          Pain Scale: FACES Pre/Post-Treatment    Pain: FACES Scale,  Pretreatment  0-->no hurt  -CH  0-->no hurt  -CH       Posttreatment Pain Rating  0-->no hurt  -CH  0-->no hurt  -CH          Clinical Impression    Daily Summary of Progress (SLP)  progress toward functional goals as expected  -  progress toward functional goals as expected  -       SLP Swallowing Diagnosis  mod-severe;oral dysphagia;severe;pharyngeal dysphagia  -  mod-severe;oral dysphagia;severe;pharyngeal dysphagia  -       Functional Impact  risk of aspiration/pneumonia;risk of malnutrition;risk of dehydration  -  risk of aspiration/pneumonia;risk of malnutrition;risk of dehydration  -       Rehab Potential/Prognosis, Swallowing  adequate, monitor progress closely  -CH  adequate, monitor progress closely  -       Swallow Criteria for Skilled Therapeutic Interventions Met  demonstrates skilled criteria  -CH  demonstrates skilled criteria  -       Plan for Continued Treatment (SLP)  Patient displayed delayed cough following trials of ice chips and immediate cough following thin H2O via teaspoon. Multiple swallows with pureed trials. Continue to follow for dysphagia as appropriate pending goals of care decisions.   -  Patient continues with overt clinical s/s of aspiration with trials of ice, thin liquids and NT liquid trials  -          Recommendations    Therapy Frequency (Swallow)  PRN  -  PRN  -       Predicted Duration Therapy Intervention (Days)  until discharge  -  until discharge  -       SLP Diet Recommendation  NPO;temporary alternate methods of nutrition/hydration;long term alternate methods of nutrition/hydration  -  NPO;temporary alternate methods of nutrition/hydration;long term alternate methods of nutrition/hydration;other (see comments)  -       Recommended Precautions and Strategies  general aspiration precautions  -  general aspiration precautions  -       Oral Care Recommendations  Oral Care BID/PRN;Suction toothbrush  -  Oral Care BID/PRN;Suction  toothbrush  -CH       SLP Rec. for Method of Medication Administration  meds via alternate route  -CH  meds via alternate route  -CH       Monitor for Signs of Aspiration  yes;notify SLP if any concerns  -CH  yes;notify SLP if any concerns  -CH       Anticipated Discharge Disposition (SLP)  unknown;anticipate therapy at next level of care  -CH  unknown;anticipate therapy at next level of care  -CH         User Key  (r) = Recorded By, (t) = Taken By, (c) = Cosigned By    Initials Name Effective Dates     Radha Kapoor MS CCC-SLP 06/16/21 -           EDUCATION  The patient has been educated in the following areas:   Dysphagia (Swallowing Impairment) Oral Care/Hydration NPO rationale.       SLP GOALS     Row Name 10/05/21 1300 10/04/21 1300          Oral Nutrition/Hydration Goal 1 (SLP)    Oral Nutrition/Hydration Goal 1, SLP  LTG: Pt will return to PO diet w/o s/s of aspiration w/ 100% accuracy w/o cues  -CH  LTG: Pt will return to PO diet w/o s/s of aspiration w/ 100% accuracy w/o cues  -CH     Time Frame (Oral Nutrition/Hydration Goal 1, SLP)  by discharge  -CH  by discharge  -CH     Progress/Outcomes (Oral Nutrition/Hydration Goal 1, SLP)  progress slower than expected  -CH  progress slower than expected  -CH        Oral Nutrition/Hydration Goal 2 (SLP)    Oral Nutrition/Hydration Goal 2, SLP  Pt will tolerate therapeutic H2O trials w/o s/s of aspiration w/ 60% accuracy to indicate readiness for repeat instrumental eval.  -CH  Pt will tolerate therapeutic H2O trials w/o s/s of aspiration w/ 60% accuracy to indicate readiness for repeat instrumental eval.  -CH     Time Frame (Oral Nutrition/Hydration Goal 2, SLP)  short term goal (STG)  -CH  short term goal (STG)  -CH     Barriers (Oral Nutrition/Hydration Goal 2, SLP)  delayed cough following trials of ice chips and immediate cough following thin H2O via teaspoon. Multiple swallows with pureed trials.  -CH  Continued overt s/s of aspiration w/ therapeutic  ice, thin and nectar thick liquid trials c/b wet vocal quality & coughing w/ suctioning. Not ready for repeat instrumental eval at this time  -CH     Progress/Outcomes (Oral Nutrition/Hydration Goal 2, SLP)  progress slower than expected  -CH  progress slower than expected  -CH       User Key  (r) = Recorded By, (t) = Taken By, (c) = Cosigned By    Initials Name Provider Type    Radha Savage MS CCC-SLP Speech and Language Pathologist             Time Calculation:   Time Calculation- SLP     Row Name 10/05/21 1424             Time Calculation- SLP    SLP Start Time  1300  -CH      SLP Received On  10/05/21  -CH         Untimed Charges    95932-XM Treatment Swallow Minutes  38  -CH         Total Minutes    Untimed Charges Total Minutes  38  -CH       Total Minutes  38  -CH        User Key  (r) = Recorded By, (t) = Taken By, (c) = Cosigned By    Initials Name Provider Type    Radha Savage MS CCC-SLP Speech and Language Pathologist          Therapy Charges for Today     Code Description Service Date Service Provider Modifiers Qty    11091049943 HC ST TREATMENT SWALLOW 2 10/4/2021 Radha Kapoor MS CCC-SLP GN 1    [id number] HC ST TREATMENT SWALLOW 3 10/5/2021 Radha Kapoor MS CCC-TESSIE GN 1               Radha Kapoor MS CCC-SLP  10/5/2021       Patient was not wearing a face mask and did exhibit coughing during this therapy encounter.  Procedure performed was aerosolizing, involved close contact (within 6 feet for at least 15 minutes or longer), and did not involve contact with infectious secretions or specimens.  Therapist used appropriate personal protective equipment including gloves, standard procedure mask and eye protection.  Appropriate PPE was worn during the entire therapy session.  Hand hygiene was completed before and after therapy session.

## 2021-10-05 NOTE — PLAN OF CARE
Goal Outcome Evaluation:  Plan of Care Reviewed With: patient    SLP treatment completed. Will continue to address dysphagia as appropriate pending goals of care decisions. Please see note for further details and recommendations.

## 2021-10-05 NOTE — DISCHARGE PLACEMENT REQUEST
"Rao Reyes (76 y.o. Male)   Referring MD; Dr. El Vázquez  PCP:?  Pt does have a state guardianFrantz [phone], we will fax the documentation completed for comfort measure & hospice care.  Covid negative on 9/24/21  BMI:25.90kg  Hospice Dx: Dysphagia/CHF/Cardiomegaly    Date of Birth Social Security Number Address Home Phone MRN    1945  [address]  [facility] [address] [phone] [id number]    Catholic Marital Status          Non-Anabaptist Single       Admission Date Admission Type Admitting Provider Attending Provider Department, Room/Bed    9/24/21 Emergency Elida Red II, Elida Kellogg II,  [facility] 3F, S323/1    Discharge Date Discharge Disposition Discharge Destination                       Attending Provider: Elida Red II, DO    Allergies: No Known Allergies    Isolation: None   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)   Code Status: No CPR    Ht: 180 cm (70.87\")   Wt: 83.9 kg (185 lb)    Admission Cmt: None   Principal Problem: None                Active Insurance as of 9/24/2021     Primary Coverage     Payor Plan Insurance Group Employer/Plan Group    MEDICARE MEDICARE A & B      Payor Plan Address Payor Plan Phone Number Payor Plan Fax Number Effective Dates    [address] [phone]  6/1/2011 - None Entered    [facility] [address]       Subscriber Name Subscriber Birth Date Member ID       RAO REYES 1945 [id number]           Secondary Coverage     Payor Plan Insurance Group Employer/Plan Group    [place]      Payor Plan Address Payor Plan Phone Number Payor Plan Fax Number Effective Dates    [address] [phone]  9/28/2017 - None Entered    [place] [address]       Subscriber Name Subscriber Birth Date Member ID       RAO REYES 1945 [id number]                 Emergency Contacts      (Rel.) Home Phone Work Phone Mobile Phone    FRANTZ JARQUIN (Guardian) -- [phone] " [phone]    NICKO JAIN (Care Giver) [phone] -- [phone]            Emergency Contact Information     Name Relation Home Work Mobile    FRANTZ JARQUIN  [phone]    NICKO JAIN Care Giver [phone]          Insurance Information                MEDICARE/MEDICARE A & B Phone: [phone]    Subscriber: Rao Alcazar Subscriber#: [id number]    Group#:  Precert#:         [place] Phone: [phone]    Subscriber: Rao Alcazar Subscriber#: [id number]    Group#:  Precert#:           Problem List         Codes Noted - Resolved       Hospital    Pneumonia of both lower lobes due to infectious organism ICD-10-CM: J18.9  ICD-9-CM: 486 2021 - Present    Chronic diastolic CHF (congestive heart failure) (HCC) ICD-10-CM: I50.32  ICD-9-CM: 428.32, 428.0 2021 - Present    Hyperlipidemia ICD-10-CM: E78.5  ICD-9-CM: 272.4 2016 - Present    Mental disorder ICD-10-CM: F99  ICD-9-CM: 300.9 Unknown - Present    History of seizure disorder ICD-10-CM: Z86.69  ICD-9-CM: V12.49 Unknown - Present    Cardiomegaly ICD-10-CM: I51.7  ICD-9-CM: 429.3 2016 - Present       Non-Hospital    History of panic attacks ICD-10-CM: Z86.59  ICD-9-CM: V11.8 Unknown - Present    Mental retardation ICD-10-CM: F79  ICD-9-CM: 319 Unknown - Present    Atypical chest pain ICD-10-CM: R07.89  ICD-9-CM: 786.59 2016 - Present    Congestive heart failure (HCC) ICD-10-CM: I50.9  ICD-9-CM: 428.0 2016 - Present    Shortness of breath ICD-10-CM: R06.02  ICD-9-CM: 786.05 2016 - Present    Cardiac mass ICD-10-CM: I51.89  ICD-9-CM: 429.89 2016 - Present    Chest pain ICD-10-CM: R07.9  ICD-9-CM: 786.50 2016 - Present             History & Physical      Johnathan Sanchez MD at 21 1700              [facility] Medicine Services  HISTORY AND PHYSICAL    Patient Name: [place] DAVID Alcazar  : 1945  MRN:  [id number]  Primary Care Physician: Provider, No Known  Date of admission: 9/24/2021    Subjective   Subjective     Chief Complaint:  Cough and fever    HPI:  Rao Alcazar is a 76 y.o. 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  Patient is not able to provide any history, history is obtained from caregiver at bedside.  Caregiver indicates that the patient began having more difficulty breathing with productive cough and fever overnight.  T-max of 101.5.  Patient additionally noted to be more weak than normal.  Patient has had several aspiration pneumonias in the past and the caregiver was concerned that this is a possibility again so chose to bring him to the ER.  Labs with elevated WBC 18, pro-Octavio 0.41.  Chest x-ray showed enlarged heart with increased pulmonary vascularity bilaterally but no acute parenchymal disease.  CT chest shows patchy groundglass opacity in the perihilar regions bilaterally concerning for infiltrate.  Patient will be admitted to hospital medicine for further evaluation and treatment.    COVID Details:        Symptoms: [] NONE [x] Fever [x]  Cough [] Shortness of breath [] Change in taste or smell  The patient has a COVID HM Topic on their chart, and they are fully vaccinated.    Review of Systems   Unable to obtain due to nonverbal  All other systems reviewed and are negative.     Personal History     Past Medical History:   Diagnosis Date   • Atypical chest pain    • Cardiomegaly    • CHF (congestive heart failure) (CMS/HCC)    • History of panic attacks    • History of seizure disorder    • Mental disorder    • Mental retardation    • Panic attacks    • Seizures (CMS/HCC)        Past Surgical History:   Procedure Laterality Date   • EXPLORATORY LAPAROTOMY         Family History:  Family  history is unknown by patient. Otherwise pertinent FHx was reviewed and unremarkable.     Social History:  reports that he has never smoked. He has never used smokeless tobacco. He reports that he does not drink alcohol and does not use drugs.  Social History     Social History Narrative   • Not on file       Medications:  Calcium Carb-Cholecalciferol, Menthol-Zinc Oxide, Polyethylene Glycol 3350, acetaminophen, aspirin, clonazePAM, hydrophor, levETIRAcetam, levothyroxine, loperamide, mupirocin, phenytoin extended, raNITIdine, sennosides-docusate, simvastatin, and tamsulosin    No Known Allergies    Objective   Objective     Vital Signs:   Temp:  [99.3 °F (37.4 °C)] 99.3 °F (37.4 °C)  Heart Rate:  [68-89] 68  Resp:  [16-22] 18  BP: ()/(53-91) 102/53  Flow (L/min):  [2] 2    Physical Exam   Constitutional: lethargic, chronically ill appearing  Eyes: PERRLA, sclerae anicteric, no conjunctival injection  HENT: NCAT, mucous membranes DRY   Neck: Supple, no thyromegaly, no lymphadenopathy, trachea midline  Respiratory: Bilateral rhonchi with decreased bases, nonlabored respirations on 2lnc   Cardiovascular: RRR, no murmurs, rubs, or gallops, palpable pedal pulses bilaterally  Gastrointestinal: Positive bowel sounds, soft, nontender, nondistended  Musculoskeletal: No bilateral ankle edema, no clubbing or cyanosis to extremities  Psychiatric: ADDIE  Neurologic: CRABTREE spontaneously, doesn't follow commands, nonverbal   Skin: No rashes    Result Review:  I have personally reviewed the results from the time of this admission to 09/24/21 5:00 PM EDT and agree with these findings:  [x]  Laboratory  [x]  Microbiology  [x]  Radiology  [x]  EKG/Telemetry   []  Cardiology/Vascular   []  Pathology  [x]  Old records  []  Other:  Most notable findings include:   Non verbal, very dehydrated looking    LAB RESULTS:      Lab 09/24/21  1249 09/24/21  1150   WBC  --  18.24*   HEMOGLOBIN  --  15.6   HEMATOCRIT  --  47.2   PLATELETS  --   260   NEUTROS ABS  --  15.15*   IMMATURE GRANS (ABS)  --  0.08*   LYMPHS ABS  --  1.88   MONOS ABS  --  1.09*   EOS ABS  --  0.01   MCV  --  95.4   PROCALCITONIN 0.41*  --    LACTATE  --  1.9         Lab 09/24/21  1249   SODIUM 138   POTASSIUM 4.6   CHLORIDE 99   CO2 29.0   ANION GAP 10.0   BUN 16   CREATININE 0.93   GLUCOSE 162*   CALCIUM 9.0   MAGNESIUM 1.9         Lab 09/24/21  1249   TOTAL PROTEIN 8.0   ALBUMIN 3.70   GLOBULIN 4.3   ALT (SGPT) 7   AST (SGOT) 10   BILIRUBIN 0.3   ALK PHOS 181*         Lab 09/24/21  1249   PROBNP 197.8   TROPONIN T <0.010                 UA    Urinalysis 9/24/21   Specific [place], UA 1.020   Ketones, UA Negative   Blood, UA Negative   Leukocytes, UA Negative   Nitrite, UA Negative           Microbiology Results (last 10 days)     Procedure Component Value - Date/Time    COVID PRE-OP / PRE-PROCEDURE SCREENING ORDER (NO ISOLATION) - Swab, Nasopharynx [[id number]]  (Normal) Collected: 09/24/21 1208    Lab Status: Final result Specimen: Swab from Nasopharynx Updated: 09/24/21 1236    Narrative:      The following orders were created for panel order COVID PRE-OP / PRE-PROCEDURE SCREENING ORDER (NO ISOLATION) - Swab, Nasopharynx.  Procedure                               Abnormality         Status                     ---------                               -----------         ------                     COVID-19 and FLU A/B PCR...[[id number]]  Normal              Final result                 Please view results for these tests on the individual orders.    COVID-19 and FLU A/B PCR - Swab, Nasopharynx [[id number]]  (Normal) Collected: 09/24/21 1208    Lab Status: Final result Specimen: Swab from Nasopharynx Updated: 09/24/21 1236     COVID19 Not Detected     Influenza A PCR Not Detected     Influenza B PCR Not Detected    Narrative:      Fact sheet for providers: https://www.fda.gov/media/392526/download    Fact sheet for patients: https://www.fda.gov/media/110822/download    Test performed  by PCR.          CT Abdomen Pelvis Without Contrast    Result Date: 9/24/2021  EXAMINATION: CT CHEST WO CONTRAST, DIAGNOSTIC-, CT ABDOMEN AND PELVIS WO CONTRAST-09/24/2021:  INDICATION: Sepsis, fever, chest pain.  TECHNIQUE: Multiple axial CT imaging was obtained of the chest, abdomen and pelvis without the administration of intravenous contrast.  The radiation dose reduction device was turned on for each scan per the ALARA (As Low as Reasonably Achievable) protocol.  COMPARISON: NONE.  FINDINGS:  CHEST:  The thyroid is homogeneous. No mediastinal mass or adenopathy. The cardiac chambers are within normal limits.  No pericardial effusion. There is patchy groundglass opacity seen in the perihilar regions bilaterally concerning for infiltrate. There is a tiny left pleural effusion. Degenerative changes seen within the spine.  ABDOMEN: The liver is heterogeneous in appearance. There is calcification seen within the spleen. Old healed granulomatous disease as well seen within the liver. The pancreas is homogeneous. The kidneys and adrenal glands are within normal limits. No abdominal or retroperitoneal lymphadenopathy. No free fluid or free air. There is stool seen scattered diffusely throughout the colon.  PELVIS: The pelvic organs are unremarkable. The pelvic portions of the gastrointestinal tract are within normal limits. No free fluid or free air. No abnormal mass or fluid collection is identified. Degenerative changes seen within the spine and pelvis.      Impression: Increased perihilar markings bilaterally suggesting bilateral infiltrate with small left pleural effusion. The cardiac chambers are enlarged with increased stool seen scattered diffusely throughout the colon. No evidence of obvious obstruction or gross free intraperitoneal air.  D:  09/24/2021 E:  09/24/2021        CT Chest Without Contrast Diagnostic    Result Date: 9/24/2021  EXAMINATION: CT CHEST WO CONTRAST, DIAGNOSTIC-, CT ABDOMEN AND PELVIS WO  CONTRAST-09/24/2021:  INDICATION: Sepsis, fever, chest pain.  TECHNIQUE: Multiple axial CT imaging was obtained of the chest, abdomen and pelvis without the administration of intravenous contrast.  The radiation dose reduction device was turned on for each scan per the ALARA (As Low as Reasonably Achievable) protocol.  COMPARISON: NONE.  FINDINGS:  CHEST:  The thyroid is homogeneous. No mediastinal mass or adenopathy. The cardiac chambers are within normal limits.  No pericardial effusion. There is patchy groundglass opacity seen in the perihilar regions bilaterally concerning for infiltrate. There is a tiny left pleural effusion. Degenerative changes seen within the spine.  ABDOMEN: The liver is heterogeneous in appearance. There is calcification seen within the spleen. Old healed granulomatous disease as well seen within the liver. The pancreas is homogeneous. The kidneys and adrenal glands are within normal limits. No abdominal or retroperitoneal lymphadenopathy. No free fluid or free air. There is stool seen scattered diffusely throughout the colon.  PELVIS: The pelvic organs are unremarkable. The pelvic portions of the gastrointestinal tract are within normal limits. No free fluid or free air. No abnormal mass or fluid collection is identified. Degenerative changes seen within the spine and pelvis.      Impression: Increased perihilar markings bilaterally suggesting bilateral infiltrate with small left pleural effusion. The cardiac chambers are enlarged with increased stool seen scattered diffusely throughout the colon. No evidence of obvious obstruction or gross free intraperitoneal air.  D:  09/24/2021 E:  09/24/2021        XR Chest 1 View    Result Date: 9/24/2021  EXAMINATION: XR CHEST 1 VW-09/24/2021:  INDICATION: Weak/Dizzy/AMS, triage protocol.  COMPARISON: NONE.  FINDINGS: Portable chest reveals the heart to be borderline enlarged. Increased pulmonary vascularity bilaterally. No definite pleural  effusion or pneumothorax. Degenerative changes seen within the spine. Increased pulmonary vascularity bilaterally.      Impression: The heart is enlarged with increased pulmonary vascularity bilaterally. No evidence of acute parenchymal disease.  D:  09/24/2021 E:  09/24/2021          Results for orders placed in visit on 07/28/16    SCANNED - ECHOCARDIOGRAM      Assessment/Plan   Assessment & Plan       Cardiomegaly    Mental disorder    History of seizure disorder    Hyperlipidemia    Pneumonia of both lower lobes due to infectious organism    Chronic diastolic CHF (congestive heart failure) (HCC)      Aspiration pneumonia  --previously treated for aspiration pneumonia several times per care giver  --currently on modified diet   --CT chest with bilateral infiltrates and tiny left pleural effusion  --Started on IV Zosyn and vancomycin in ED  --MRSA PCR pending, if negative will stop vancomycin  --SLP consultation   --NPO tonight     Seizure disorder  --Continue Keppra and Dilantin  --last seizure was one month ago  --follows with Dr Fulton     Diastolic heart failure  Cardiomegaly  --ASA    Hyperlipidemia  --Continue statin    DVT prophylaxis:  Heparin     CODE STATUS:    Level Of Support Discussed With: Health Care Surrogate  Code Status: CPR  Medical Interventions (Level of Support Prior to Arrest): Full      This note has been completed as part of a split-shared workflow.   Signature: Electronically signed by MONIKA Zaragoza, 09/24/21, 5:29 PM EDT.    Attending   Admission Attestation       I have seen and examined the patient, performing an independent face-to-face diagnostic evaluation with plan of care reviewed and developed with the advanced practice clinician (APC).      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    He  is non verbal and unable to provide history.  His caregiver is in the ER and can provide some but limited history.  He looks dehydrated and chronically ill and is tachycardic in the ER on my visit.   He breathes with his mouth wide open.    Remainder of detailed HPI is as noted by APC and has been reviewed and/or edited by me for completeness.    Attending Physical Exam:    Constitutional: sleeping, chronically ill appearing  Eyes: PERRLA, sclerae anicteric, no conjunctival injection  HENT: NCAT, dry tongue  Neck: Supple, no JVD, trachea midline  Respiratory: Clear to auscultation bilaterally, nonlabored respirations   Cardiovascular: tachy, s1 and s2  Gastrointestinal: Positive bowel sounds, soft, nontender, nondistended  Musculoskeletal: No bilateral ankle edema, no clubbing or cyanosis to extremities  Psychiatric: flat affect, not communicating  Skin: dry, + skin tenting    Brief Assessment/Plan :  See detailed assessment and plan developed with APC which I have reviewed and/or edited for completeness.      Admission Status: I believe that this patient meets INPATIENT status due to fever of unclear etiology and probable aspiration.  I feel patient’s risk for adverse outcomes and need for care warrant INPATIENT evaluation and I predict the patient’s care encounter to likely last beyond 2 midnights.        Johnathan Sanchez MD  09/24/21                          Electronically signed by Johnathan Sanchez MD at 09/24/21 2211         Current Facility-Administered Medications   Medication Dose Route Frequency Provider Last Rate Last Admin   • acetaminophen (TYLENOL) 160 MG/5ML solution 650 mg  650 mg Nasogastric Q4H PRN Sunny Watts RPH       • aspirin chewable tablet 81 mg  81 mg Nasogastric Daily Sunny Watts RPH   81 mg at 09/28/21 0814    Or   • aspirin suppository 300 mg  300 mg Rectal Daily Sunny Watts RPH   300 mg at 10/02/21 0839   • atorvastatin (LIPITOR) tablet 10 mg  10 mg Nasogastric Nightly Box,  Sunny YE RPH   10 mg at 09/28/21 2007   • sennosides-docusate (PERICOLACE) 8.6-50 MG per tablet 2 tablet  2 tablet Nasogastric BID Sunny Watts RPH   2 tablet at 09/28/21 2007    And   • polyethylene glycol (MIRALAX) packet 17 g  17 g Nasogastric Daily PRN Sunny Watts RPH        And   • bisacodyl (DULCOLAX) EC tablet 5 mg  5 mg Oral Daily PRN Sunny Watts RP        And   • bisacodyl (DULCOLAX) suppository 10 mg  10 mg Rectal Daily PRN Sunny Watts RP       • dextrose 5 % with KCl 20 mEq/L infusion  75 mL/hr Intravenous Continuous Leyla Olguin APRN 75 mL/hr at 10/03/21 1911 75 mL/hr at 10/03/21 1911   • fosphenytoin (Cerebyx) 100 mg PE in sodium chloride 0.9 % 50 mL IVPB  100 mg PE Intravenous Q6H Emely Mejia APRN   100 mg PE at 10/05/21 1826   • heparin (porcine) 5000 UNIT/ML injection 5,000 Units  5,000 Units Subcutaneous Q8H Jing Tran APRN   5,000 Units at 10/05/21 1752   • levETIRAcetam (KEPPRA) 2,000 mg in sodium chloride 0.9 % 250 mL IVPB  2,000 mg Intravenous Q12H Emely Mejia, APRN   2,000 mg at 10/05/21 1046   • Levothyroxine Sodium injection 20 mcg  20 mcg Intravenous Daily Emely Mejia, APRN   20 mcg at 10/05/21 1237   • palliative care oral rinse   Mouth/Throat TID - RT El Vázquez DO   Given at 10/05/21 1753   • PRO-STAT oral liquid 30 mL  30 mL Nasogastric Daily Jimmy Chacon, CATRACHITA   30 mL at 09/28/21 0814   • sodium chloride 0.9 % flush 10 mL  10 mL Intravenous PRN Wilfredo Felton DO       • sodium chloride 0.9 % flush 10 mL  10 mL Intravenous Q12H Jing Tran, APRN   10 mL at 10/03/21 2006   • sodium chloride 0.9 % flush 10 mL  10 mL Intravenous PRN Jing Tran, MONIKA            Physician Progress Notes (last 72 hours) (Notes from 10/02/21 1959 through 10/05/21 1959)      Fidelia Mcintyre APRN at 10/05/21 0940              [facility] Medicine Services  PROGRESS NOTE    Patient Name: Rao DAVID  "Ottoniel  : 1945  MRN: [id number]    Date of Admission: 2021  Primary Care Physician: Provider, No Known    Subjective   Subjective     CC: f/u dysphagia    HPI:   Resting up in bed awake and alert.  Frail chronically ill-appearing.  Generally nonverbal.  When I say good morning Fátima did repeat \"good morning\" mumbling.  No other verbal.  No new issues per staff.    ROS:  UTO due to AMS.     Objective   Objective     Vital Signs:   Temp:  [97 °F (36.1 °C)-97.9 °F (36.6 °C)] 97.8 °F (36.6 °C)  Heart Rate:  [62-74] 70  Resp:  [16-18] 18  BP: (106-148)/(58-76) 140/76  Flow (L/min):  [2] 2     Physical Exam:  Constitutional: No acute distress, awake, alert.  Resting up in bed.  Chronically ill and frail appearing  HENT: NCAT, mucous membranes moist  Respiratory: Clear to auscultation bilaterally, respiratory effort normal   Cardiovascular: RRR, no murmurs, rubs, or gallops  Gastrointestinal: Positive bowel sounds, soft, nontender, nondistended  Musculoskeletal: No bilateral ankle edema.  Franco spontaneously.  Psychiatric: ADDIE  Neurologic: ADDIE  Skin: No rashes    Results Reviewed:  LAB RESULTS:      Lab 10/04/21  0653 21  1205   WBC 4.28 3.85   HEMOGLOBIN 14.9 14.4   HEMATOCRIT 44.1 42.5   PLATELETS 227 187   NEUTROS ABS 2.11 1.79   IMMATURE GRANS (ABS) 0.02 0.01   LYMPHS ABS 1.28 1.24   MONOS ABS 0.50 0.53   EOS ABS 0.34 0.26   MCV 93.0 93.2         Lab 10/04/21  0653 10/02/21  1534 21  1205   SODIUM 134* 137 138   POTASSIUM 3.8 3.8 3.7   CHLORIDE 99 100 97*   CO2 27.0 32.0* 31.0*   ANION GAP 8.0 5.0 10.0   BUN 4* 3* 4*   CREATININE 0.42* 0.38* 0.37*   GLUCOSE 101* 115* 126*   CALCIUM 8.8 8.9 8.8   MAGNESIUM 1.9  --  1.8   PHOSPHORUS 3.0  --   --          Lab 10/04/21  0653   TOTAL PROTEIN 7.6   ALBUMIN 3.30*   ALT (SGPT) 30   AST (SGOT) 32   BILIRUBIN 0.4   ALK PHOS 134*             Lab 10/04/21  0653   CHOLESTEROL 177   TRIGLYCERIDES 136             Brief Urine Lab Results  (Last result in the " past 365 days)      Color   Clarity   Blood   Leuk Est   Nitrite   Protein   CREAT   Urine HCG        09/24/21 1203 Yellow Clear Negative Negative Negative Negative               Microbiology Results Abnormal     Procedure Component Value - Date/Time    Blood Culture - Blood, Arm, Right [[id number]] Collected: 09/24/21 1150    Lab Status: Final result Specimen: Blood from Arm, Right Updated: 09/29/21 1231     Blood Culture No growth at 5 days    Blood Culture - Blood, Arm, Left [[id number]] Collected: 09/24/21 1150    Lab Status: Final result Specimen: Blood from Arm, Left Updated: 09/29/21 1231     Blood Culture No growth at 5 days    COVID PRE-OP / PRE-PROCEDURE SCREENING ORDER (NO ISOLATION) - Swab, Nasopharynx [[id number]]  (Normal) Collected: 09/24/21 1208    Lab Status: Final result Specimen: Swab from Nasopharynx Updated: 09/24/21 1236    Narrative:      The following orders were created for panel order COVID PRE-OP / PRE-PROCEDURE SCREENING ORDER (NO ISOLATION) - Swab, Nasopharynx.  Procedure                               Abnormality         Status                     ---------                               -----------         ------                     COVID-19 and FLU A/B PCR...[[id number]]  Normal              Final result                 Please view results for these tests on the individual orders.    COVID-19 and FLU A/B PCR - Swab, Nasopharynx [[id number]]  (Normal) Collected: 09/24/21 1208    Lab Status: Final result Specimen: Swab from Nasopharynx Updated: 09/24/21 1236     COVID19 Not Detected     Influenza A PCR Not Detected     Influenza B PCR Not Detected    Narrative:      Fact sheet for providers: https://www.fda.gov/media/188519/download    Fact sheet for patients: https://www.fda.gov/media/777432/download    Test performed by PCR.        CXR personally reviewed showing bilateral opacities. Agree with interpretation.    Results for orders placed during the hospital encounter of 09/24/21    Adult  Transthoracic Echo Complete W/ Cont if Necessary Per Protocol    Interpretation Summary  · Normal left ventricular systolic function, estimated EF 60%.  · Aortic sclerosis without aortic stenosis.  · Trace aortic insufficiency.  · Trace mitral regurgitation, trace tricuspid regurgitation, normal RVSP.      I have reviewed the medications:  Scheduled Meds:aspirin, 81 mg, Nasogastric, Daily   Or  aspirin, 300 mg, Rectal, Daily  atorvastatin, 10 mg, Nasogastric, Nightly  fosphenytoin, 100 mg PE, Intravenous, Q6H  heparin (porcine), 5,000 Units, Subcutaneous, Q8H  levETIRAcetam, 2,000 mg, Intravenous, Q12H  Levothyroxine Sodium, 20 mcg, Intravenous, Daily  palliative care oral rinse, , Mouth/Throat, TID - RT  PRO-STAT, 30 mL, Nasogastric, Daily  senna-docusate sodium, 2 tablet, Nasogastric, BID  sodium chloride, 10 mL, Intravenous, Q12H      Continuous Infusions:dextrose 5 % with KCl 20 mEq, 75 mL/hr, Last Rate: 75 mL/hr (10/03/21 1911)      PRN Meds:.•  acetaminophen  •  senna-docusate sodium **AND** polyethylene glycol **AND** bisacodyl **AND** bisacodyl  •  sodium chloride  •  sodium chloride    Assessment/Plan   Assessment & Plan     Active Hospital Problems    Diagnosis  POA   • Pneumonia of both lower lobes due to infectious organism [J18.9]  Yes   • Chronic diastolic CHF (congestive heart failure) (HCC) [I50.32]  Unknown   • Hyperlipidemia [E78.5]  Yes   • Mental disorder [F99]  Yes   • History of seizure disorder [Z86.69]  Not Applicable   • Cardiomegaly [I51.7]  Yes      Resolved Hospital Problems   No resolved problems to display.        Brief Hospital Course to date:  Rao Alcazar is a 76 y.o. male patient with severe intellectual disability, nonverbal at baseline (guardian of state), with history of dyslipidemia, seizure disorder and chronic hypoxic respiratory failure on home oxygen at 2 L/min presented to the hospital with fever and cough and was found to have bilateral basal pneumonia. This is my first  "day assessing patient's active medical issues.     This patient's problems and plans were partially entered by my partner and updated as appropriate by me 10/05/21.    Assessment/plan:  Patient is new to me today    [facility]  --My partner prior had d/w patient's family. This patient suffers from chronic conditions such as seizure disorder, cognitive delay and heart failure. DNR/DNI is appropriate as this would only prolong suffering and would most likely not be successful - guardian in agreement - changed to DNR/DNI.  --Most currently this patient has been diagnosed with severe dysphagia. He has been receiving nutrition via an NG tube which is temporary but he has pulled at this tube and continues trying to push it out with his mouth. His sitter, whom knows him well, states that he \"lives to eat\". A comfort diet along with comfort measures would be most appropriate as a PEG placement and any further aggressive treatment would only prolong suffering. He is progressive in his chronic conditions with no improvement foreseen. Awaiting guardian decision regarding this.     Aspiration pneumonia  Dysphagia   -CT chest showed bilateral pulmonary infiltrates and pleural effusion, concerning for community acquired pneumonia  -Has completed course of IV abx.  --Awaiting decision from guardian regarding comfort diet versus PEG.     Seizure disorder  -Keppra and Dilantin  -Seizure precautions, stable     Elevated alk phos  --improved     Diastolic heart failure, not in decompensation  -echo EF 60%, aortic stenosis, normal RVSP   -Compensated. Continue aspirin     Hyperlipidemia  -Continue statin     Severe intellectual disability   Nonverbal   --Stable to baseline    DVT prophylaxis:  Medical DVT prophylaxis orders are present.       AM-PAC 6 Clicks Score (PT): 6 (10/04/21 1945)    Disposition: I expect the patient to be discharged TBD.  Pending guardianship decision regarding comfort diet versus PEG with social work " following.    CODE STATUS:   Code Status and Medical Interventions:   Ordered at: 21 1753     Limited Support to NOT Include:    Intubation    Cardioversion/Defibrillation     Code Status:    No CPR     Medical Interventions (Level of Support Prior to Arrest):    Yasmin Mcintyre, MONIKA  10/05/21              Electronically signed by Fidelia Mcintyre, APRN at 10/05/21 1204     Elida Red II, DO at 10/04/21 0952              [facility] Medicine Services  PROGRESS NOTE    Patient Name: Rao Alcazar  : 1945  MRN: [id number]    Date of Admission: 2021  Primary Care Physician: Provider, No Known    Subjective   Subjective     CC: f/u dysphagia    HPI: Up in bed awake. Nods head no that nothing is bothering him.    ROS:  UTO due to AMS.     Objective   Objective     Vital Signs:   Temp:  [96.2 °F (35.7 °C)-98.2 °F (36.8 °C)] 97.9 °F (36.6 °C)  Heart Rate:  [63-70] 65  Resp:  [16-18] 18  BP: (104-142)/(64-77) 118/69  Flow (L/min):  [2] 2     Physical Exam:  Constitutional: No acute distress, awake, alert  HENT: NCAT, mucous membranes moist  Respiratory: Clear to auscultation bilaterally, respiratory effort normal   Cardiovascular: RRR, no murmurs, rubs, or gallops  Gastrointestinal: Positive bowel sounds, soft, nontender, nondistended  Musculoskeletal: No bilateral ankle edema  Psychiatric: ADDIE  Neurologic: ADDIE  Skin: No rashes    Results Reviewed:  LAB RESULTS:      Lab 10/04/21  0653 21  1205   WBC 4.28 3.85   HEMOGLOBIN 14.9 14.4   HEMATOCRIT 44.1 42.5   PLATELETS 227 187   NEUTROS ABS 2.11 1.79   IMMATURE GRANS (ABS) 0.02 0.01   LYMPHS ABS 1.28 1.24   MONOS ABS 0.50 0.53   EOS ABS 0.34 0.26   MCV 93.0 93.2         Lab 10/04/21  0653 10/02/21  1534 21  1205 21  0720   SODIUM 134* 137 138 140   POTASSIUM 3.8 3.8 3.7 3.6   CHLORIDE 99 100 97* 103   CO2 27.0 32.0* 31.0* 26.0   ANION GAP 8.0 5.0 10.0 11.0   BUN 4* 3* 4* 5*    CREATININE 0.42* 0.38* 0.37* 0.64*   GLUCOSE 101* 115* 126* 155*   CALCIUM 8.8 8.9 8.8 8.7   MAGNESIUM 1.9  --  1.8  --    PHOSPHORUS 3.0  --   --   --          Lab 10/04/21  0653   TOTAL PROTEIN 7.6   ALBUMIN 3.30*   ALT (SGPT) 30   AST (SGOT) 32   BILIRUBIN 0.4   ALK PHOS 134*             Lab 10/04/21  0653   CHOLESTEROL 177   TRIGLYCERIDES 136             Brief Urine Lab Results  (Last result in the past 365 days)      Color   Clarity   Blood   Leuk Est   Nitrite   Protein   CREAT   Urine HCG        09/24/21 1203 Yellow Clear Negative Negative Negative Negative               Microbiology Results Abnormal     Procedure Component Value - Date/Time    Blood Culture - Blood, Arm, Right [[id number]] Collected: 09/24/21 1150    Lab Status: Final result Specimen: Blood from Arm, Right Updated: 09/29/21 1231     Blood Culture No growth at 5 days    Blood Culture - Blood, Arm, Left [[id number]] Collected: 09/24/21 1150    Lab Status: Final result Specimen: Blood from Arm, Left Updated: 09/29/21 1231     Blood Culture No growth at 5 days    COVID PRE-OP / PRE-PROCEDURE SCREENING ORDER (NO ISOLATION) - Swab, Nasopharynx [[id number]]  (Normal) Collected: 09/24/21 1208    Lab Status: Final result Specimen: Swab from Nasopharynx Updated: 09/24/21 1236    Narrative:      The following orders were created for panel order COVID PRE-OP / PRE-PROCEDURE SCREENING ORDER (NO ISOLATION) - Swab, Nasopharynx.  Procedure                               Abnormality         Status                     ---------                               -----------         ------                     COVID-19 and FLU A/B PCR...[[id number]]  Normal              Final result                 Please view results for these tests on the individual orders.    COVID-19 and FLU A/B PCR - Swab, Nasopharynx [[id number]]  (Normal) Collected: 09/24/21 1208    Lab Status: Final result Specimen: Swab from Nasopharynx Updated: 09/24/21 1236     COVID19 Not Detected      Influenza A PCR Not Detected     Influenza B PCR Not Detected    Narrative:      Fact sheet for providers: https://www.fda.gov/media/372970/download    Fact sheet for patients: https://www.fda.gov/media/550535/download    Test performed by PCR.        CXR personally reviewed showing bilateral opacities. Agree with interpretation.    Results for orders placed during the hospital encounter of 09/24/21    Adult Transthoracic Echo Complete W/ Cont if Necessary Per Protocol    Interpretation Summary  · Normal left ventricular systolic function, estimated EF 60%.  · Aortic sclerosis without aortic stenosis.  · Trace aortic insufficiency.  · Trace mitral regurgitation, trace tricuspid regurgitation, normal RVSP.      I have reviewed the medications:  Scheduled Meds:aspirin, 81 mg, Nasogastric, Daily   Or  aspirin, 300 mg, Rectal, Daily  atorvastatin, 10 mg, Nasogastric, Nightly  fosphenytoin, 100 mg PE, Intravenous, Q6H  heparin (porcine), 5,000 Units, Subcutaneous, Q8H  levETIRAcetam, 2,000 mg, Intravenous, Q12H  Levothyroxine Sodium, 20 mcg, Intravenous, Daily  palliative care oral rinse, , Mouth/Throat, TID - RT  PRO-STAT, 30 mL, Nasogastric, Daily  senna-docusate sodium, 2 tablet, Nasogastric, BID  sodium chloride, 10 mL, Intravenous, Q12H      Continuous Infusions:dextrose 5 % with KCl 20 mEq, 75 mL/hr, Last Rate: 75 mL/hr (10/03/21 1911)      PRN Meds:.•  acetaminophen  •  senna-docusate sodium **AND** polyethylene glycol **AND** bisacodyl **AND** bisacodyl  •  sodium chloride  •  sodium chloride    Assessment/Plan   Assessment & Plan     Active Hospital Problems    Diagnosis  POA   • Pneumonia of both lower lobes due to infectious organism [J18.9]  Yes   • Chronic diastolic CHF (congestive heart failure) (HCC) [I50.32]  Unknown   • Hyperlipidemia [E78.5]  Yes   • Mental disorder [F99]  Yes   • History of seizure disorder [Z86.69]  Not Applicable   • Cardiomegaly [I51.7]  Yes      Resolved Hospital Problems   No  "resolved problems to display.        Brief Hospital Course to date:  Rao Alcazar is a 76 y.o. male patient with severe intellectual disability, nonverbal at baseline (guardian of state), with history of dyslipidemia, seizure disorder and chronic hypoxic respiratory failure on home oxygen at 2 L/min presented to the hospital with fever and cough and was found to have bilateral basal pneumonia. This is my first day assessing patient's active medical issues.     [facility]  --My partner has d/w patient's family. This patient suffers from chronic conditions such as seizure disorder, cognitive delay and heart failure. DNR/DNI is appropriate as this would only prolong suffering and would most likely not be successful - guardian in agreement - changed to DNR/DNI.  --Most currently this patient has been diagnosed with severe dysphagia. He has been receiving nutrition via an NG tube which is temporary but he has pulled at this tube and continues trying to push it out with his mouth. His sitter, whom knows him well, states that he \"lives to eat\". A comfort diet along with comfort measures would be most appropriate as a PEG placement and any further aggressive treatment would only prolong suffering. He is progressive in his chronic conditions with no improvement foreseen. Awaiting guardian decision regarding this.     Aspiration pneumonia  Dysphagia   -CT chest showed bilateral pulmonary infiltrates and pleural effusion, concerning for community acquired pneumonia  -Has completed course of IV abx.     Seizure disorder  -Keppra and Dilantin  -Seizure precautions     Diastolic heart failure, not in decompensation  -echo EF 60%, aortic stenosis, normal RVSP   -Compensated. Continue aspirin     Hyperlipidemia  -Continue statin     Severe intellectual disability   Nonverbal      This patient's problems and plans were partially entered by my partner and updated as appropriate by me 10/04/21.    DVT prophylaxis:  Medical DVT prophylaxis " orders are present.       AM-PAC 6 Clicks Score (PT): 6 (10/03/21 2010)    Disposition: I expect the patient to be discharged TBD.    CODE STATUS:   Code Status and Medical Interventions:   Ordered at: 21 1753     Limited Support to NOT Include:    Intubation    Cardioversion/Defibrillation     Code Status:    No CPR     Medical Interventions (Level of Support Prior to Arrest):    Limited       Elida Red II,   10/04/21              Electronically signed by Elida Red II, DO at 10/04/21 1337     Sapphire Abbott, DO at 10/03/21 2154              [facility] Medicine Services  PROGRESS NOTE    Patient Name: Rao Alcazar  : 1945  MRN: [id number]    Date of Admission: 2021  Primary Care Physician: Provider, No Known    Subjective   Subjective     CC:  F/u Fever and hypoxia    HPI:  Patient is indicates that he would like to eat again today  No acute events over night.       ROS:  Unable to obtain due to mental status     Objective   Objective     Vital Signs:   Temp:  [96 °F (35.6 °C)-98.3 °F (36.8 °C)] 96.2 °F (35.7 °C)  Heart Rate:  [63-82] 63  Resp:  [16] 16  BP: (116-154)/(69-92) 139/71  Flow (L/min):  [2] 2     Physical Exam:  Constitutional: No acute distress, awake, chronically ill appearing male   HENT: NCAT, mucous membranes moist  Respiratory: Clear to auscultation bilaterally, respiratory effort normal   Cardiovascular: RRR, no murmurs, rubs, or gallops  Gastrointestinal: Positive bowel sounds, soft, nontender, nondistended  Musculoskeletal: No bilateral ankle edema  Psychiatric: unable to assess   Neurologic: Opens eyes to stimuli   Skin: No rashes      Results Reviewed:  LAB RESULTS:      Lab 21  1205 21  0835   WBC 3.85 6.12   HEMOGLOBIN 14.4 14.8   HEMATOCRIT 42.5 44.8   PLATELETS 187 215   NEUTROS ABS 1.79 4.45   IMMATURE GRANS (ABS) 0.01 0.05   LYMPHS ABS 1.24 1.03   MONOS ABS 0.53 0.49   EOS ABS 0.26 0.07   MCV 93.2 96.8          Lab 10/02/21  1534 09/30/21  1205 09/28/21  0720 09/27/21  0835   SODIUM 137 138 140 141   POTASSIUM 3.8 3.7 3.6 3.9   CHLORIDE 100 97* 103 100   CO2 32.0* 31.0* 26.0 23.0   ANION GAP 5.0 10.0 11.0 18.0*   BUN 3* 4* 5* 9   CREATININE 0.38* 0.37* 0.64* 0.65*   GLUCOSE 115* 126* 155* 76   CALCIUM 8.9 8.8 8.7 8.8   MAGNESIUM  --  1.8  --  2.0   PHOSPHORUS  --   --   --  2.3*                         Brief Urine Lab Results  (Last result in the past 365 days)      Color   Clarity   Blood   Leuk Est   Nitrite   Protein   CREAT   Urine HCG        09/24/21 1203 Yellow Clear Negative Negative Negative Negative               Microbiology Results Abnormal     Procedure Component Value - Date/Time    Blood Culture - Blood, Arm, Right [[id number]] Collected: 09/24/21 1150    Lab Status: Final result Specimen: Blood from Arm, Right Updated: 09/29/21 1231     Blood Culture No growth at 5 days    Blood Culture - Blood, Arm, Left [[id number]] Collected: 09/24/21 1150    Lab Status: Final result Specimen: Blood from Arm, Left Updated: 09/29/21 1231     Blood Culture No growth at 5 days    COVID PRE-OP / PRE-PROCEDURE SCREENING ORDER (NO ISOLATION) - Swab, Nasopharynx [[id number]]  (Normal) Collected: 09/24/21 1208    Lab Status: Final result Specimen: Swab from Nasopharynx Updated: 09/24/21 1236    Narrative:      The following orders were created for panel order COVID PRE-OP / PRE-PROCEDURE SCREENING ORDER (NO ISOLATION) - Swab, Nasopharynx.  Procedure                               Abnormality         Status                     ---------                               -----------         ------                     COVID-19 and FLU A/B PCR...[[id number]]  Normal              Final result                 Please view results for these tests on the individual orders.    COVID-19 and FLU A/B PCR - Swab, Nasopharynx [[id number]]  (Normal) Collected: 09/24/21 1208    Lab Status: Final result Specimen: Swab from Nasopharynx Updated:  09/24/21 1236     COVID19 Not Detected     Influenza A PCR Not Detected     Influenza B PCR Not Detected    Narrative:      Fact sheet for providers: https://www.fda.gov/media/290599/download    Fact sheet for patients: https://www.fda.gov/media/460466/download    Test performed by PCR.          No radiology results from the last 24 hrs    Results for orders placed during the hospital encounter of 09/24/21    Adult Transthoracic Echo Complete W/ Cont if Necessary Per Protocol    Interpretation Summary  · Normal left ventricular systolic function, estimated EF 60%.  · Aortic sclerosis without aortic stenosis.  · Trace aortic insufficiency.  · Trace mitral regurgitation, trace tricuspid regurgitation, normal RVSP.      I have reviewed the medications:  Scheduled Meds:aspirin, 81 mg, Nasogastric, Daily   Or  aspirin, 300 mg, Rectal, Daily  atorvastatin, 10 mg, Nasogastric, Nightly  fosphenytoin, 100 mg PE, Intravenous, Q6H  heparin (porcine), 5,000 Units, Subcutaneous, Q8H  levETIRAcetam, 2,000 mg, Intravenous, Q12H  Levothyroxine Sodium, 20 mcg, Intravenous, Daily  palliative care oral rinse, , Mouth/Throat, TID - RT  PRO-STAT, 30 mL, Nasogastric, Daily  senna-docusate sodium, 2 tablet, Nasogastric, BID  sodium chloride, 10 mL, Intravenous, Q12H      Continuous Infusions:dextrose 5 % with KCl 20 mEq, 75 mL/hr, Last Rate: 75 mL/hr (10/03/21 1911)      PRN Meds:.•  acetaminophen  •  senna-docusate sodium **AND** polyethylene glycol **AND** bisacodyl **AND** bisacodyl  •  sodium chloride  •  sodium chloride    Assessment/Plan   Assessment & Plan     Active Hospital Problems    Diagnosis  POA   • Pneumonia of both lower lobes due to infectious organism [J18.9]  Yes   • Chronic diastolic CHF (congestive heart failure) (HCC) [I50.32]  Unknown   • Hyperlipidemia [E78.5]  Yes   • Mental disorder [F99]  Yes   • History of seizure disorder [Z86.69]  Not Applicable   • Cardiomegaly [I51.7]  Yes      Resolved Hospital Problems  "  No resolved problems to display.        Brief Hospital Course to date:  Rao Alcazar is a 76 y.o. male patient with severe intellectual disability, nonverbal at baseline (guardian of state), with history of dyslipidemia, seizure disorder and chronic hypoxic respiratory failure on home oxygen at 2 L/min presented to the hospital with fever and cough and was found to have bilateral basal pneumonia.     Assessment and plan:    This patient's problems and plans were partially entered by my partner and updated as appropriate by me 10/03/21.    [facility]   This patient suffers from chronic conditions such as seizure disorder, cognitive delay and heart failure. DNR/DNI is appropriate as this would only prolong suffering and would most likely not be successful.   This patient has been diagnosed with severe dysphagia. He has been receiving nutrition via an NG tube which is temporary. He has pulled at this tube and continues trying to push it out with his mouth. His sitter, whom knows him well, states that he \"lives to eat\". A comfort diet along with comfort measures would be most appropriate as a PEG placement and any further aggressive treatment would only prolong suffering. He is progressive in his chronic conditions with no improvement foreseen.     Bilateral community-acquired pneumonia vs    aspiration pneumonia  Dysphagia   -CT chest showed bilateral pulmonary infiltrates and pleural effusion, concerning for community acquired pneumonia  -MRSA swab is positive.  -zosyn completed   -NG removed, getting IVF        Seizure disorder  - Keppra and Dilantin  -trying to obtain home onfi   -Seizure precautions    Diastolic heart failure, not in decompensation  - echo EF 60%, aortic stenosis, normal RVSP   -Continue aspirin     Hyperlipidemia  · Continue statin     Severe intellectual disability   Nonverbal     Family Update  Waiting on state guardian to give permission for comfort measures.     DVT prophylaxis:  Medical DVT " prophylaxis orders are present.       AM-PAC 6 Clicks Score (PT): 6 (10/02/21 0800)    Disposition: I expect the patient to be discharged to be determined    CODE STATUS:   Code Status and Medical Interventions:   Ordered at: 21 1753     Limited Support to NOT Include:    Intubation    Cardioversion/Defibrillation     Code Status:    No CPR     Medical Interventions (Level of Support Prior to Arrest):    Limited       Sapphire Abbott DO  10/03/21                Electronically signed by Sapphire Abbott DO at 10/03/21 2155       Consult Notes (last 72 hours) (Notes from 10/02/21 1959 through 10/05/21 1959)    No notes of this type exist for this encounter.            Speech Language Pathology Notes (last 72 hours) (Notes from 10/02/21 1959 through 10/05/21 1959)      Radha Kapoor MS CCC-SLP at 10/05/21 1428        Goal Outcome Evaluation:  Plan of Care Reviewed With: patient    SLP treatment completed. Will continue to address dysphagia as appropriate pending goals of care decisions. Please see note for further details and recommendations.                  Electronically signed by Radha Kapoor MS CCC-SLP at 10/05/21 1429     Radha Kapoor MS CCC-SLP at 10/05/21 1425          Acute Care - Speech Language Pathology   Swallow Treatment Note [facility]     Patient Name: Rao Alcazar  : 1945  MRN: [id number]  Today's Date: 10/5/2021               Admit Date: 2021    Visit Dx:     ICD-10-CM ICD-9-CM   1. Pneumonia of both lower lobes due to infectious organism  J18.9 486   2. Acute on chronic respiratory failure with hypoxia (HCC)  J96.21 518.84     799.02   3. Oropharyngeal dysphagia  R13.12 787.22     Patient Active Problem List   Diagnosis   • Atypical chest pain   • Cardiomegaly   • Congestive heart failure (HCC)   • Shortness of breath   • Cardiac mass   • Chest pain   • Mental disorder   • History of panic attacks   • History of seizure disorder   • Mental  retardation   • Hyperlipidemia   • Pneumonia of both lower lobes due to infectious organism   • Chronic diastolic CHF (congestive heart failure) (HCC)     Past Medical History:   Diagnosis Date   • Atypical chest pain    • Cardiomegaly    • CHF (congestive heart failure) (CMS/HCC)    • History of panic attacks    • History of seizure disorder    • Mental disorder    • Mental retardation    • Panic attacks    • Seizures (CMS/HCC)      Past Surgical History:   Procedure Laterality Date   • EXPLORATORY LAPAROTOMY         SLP Recommendation and Plan  SLP Swallowing Diagnosis: mod-severe, oral dysphagia, severe, pharyngeal dysphagia (10/05/21 1300)  SLP Diet Recommendation: NPO, temporary alternate methods of nutrition/hydration, long term alternate methods of nutrition/hydration (10/05/21 1300)  Recommended Precautions and Strategies: general aspiration precautions (10/05/21 1300)  SLP Rec. for Method of Medication Administration: meds via alternate route (10/05/21 1300)     Monitor for Signs of Aspiration: yes, notify SLP if any concerns (10/05/21 1300)     Swallow Criteria for Skilled Therapeutic Interventions Met: demonstrates skilled criteria (10/05/21 1300)  Anticipated Discharge Disposition (SLP): unknown, anticipate therapy at next level of care (10/05/21 1300)  Rehab Potential/Prognosis, Swallowing: adequate, monitor progress closely (10/05/21 1300)  Therapy Frequency (Swallow): PRN (10/05/21 1300)  Predicted Duration Therapy Intervention (Days): until discharge (10/05/21 1300)     Daily Summary of Progress (SLP): progress toward functional goals as expected (10/05/21 1300)    Plan for Continued Treatment (SLP): Patient displayed delayed cough following trials of ice chips and immediate cough following thin H2O via teaspoon. Multiple swallows with pureed trials. Continue to follow for dysphagia as appropriate pending goals of care decisions.  (10/05/21 1300)                        SWALLOW EVALUATION (last 72  hours)      SLP Adult Swallow Evaluation     Row Name 10/05/21 1300 10/04/21 4790                Rehab Evaluation    Document Type  therapy note (daily note)  -  therapy note (daily note)  -       Subjective Information  no complaints  -  no complaints  -       Patient Observations  alert;cooperative;agree to therapy  -  alert;cooperative  -       Patient/Family/Caregiver Comments/Observations  No family present  -  No family present  -       Care Plan Review  evaluation/treatment results reviewed;care plan/treatment goals reviewed;risks/benefits reviewed;current/potential barriers reviewed;patient/other agree to care plan  -  evaluation/treatment results reviewed;care plan/treatment goals reviewed;risks/benefits reviewed;current/potential barriers reviewed;patient/other agree to care plan  -       Patient Effort  adequate  -CH  adequate  -CH       Symptoms Noted During/After Treatment  none  -CH  none  -          General Information    Patient Profile Reviewed  yes  -CH  yes  -CH          Pain    Additional Documentation  Pain Scale: FACES Pre/Post-Treatment (Group)  -CH  Pain Scale: FACES Pre/Post-Treatment (Group)  -CH          Pain Scale: FACES Pre/Post-Treatment    Pain: FACES Scale, Pretreatment  0-->no hurt  -CH  0-->no hurt  -CH       Posttreatment Pain Rating  0-->no hurt  -CH  0-->no hurt  -CH          Clinical Impression    Daily Summary of Progress (SLP)  progress toward functional goals as expected  -  progress toward functional goals as expected  -       SLP Swallowing Diagnosis  mod-severe;oral dysphagia;severe;pharyngeal dysphagia  -  mod-severe;oral dysphagia;severe;pharyngeal dysphagia  -       Functional Impact  risk of aspiration/pneumonia;risk of malnutrition;risk of dehydration  -  risk of aspiration/pneumonia;risk of malnutrition;risk of dehydration  -       Rehab Potential/Prognosis, Swallowing  adequate, monitor progress closely  -  adequate, monitor  progress closely  -       Swallow Criteria for Skilled Therapeutic Interventions Met  demonstrates skilled criteria  -  demonstrates skilled criteria  -       Plan for Continued Treatment (SLP)  Patient displayed delayed cough following trials of ice chips and immediate cough following thin H2O via teaspoon. Multiple swallows with pureed trials. Continue to follow for dysphagia as appropriate pending goals of care decisions.   -  Patient continues with overt clinical s/s of aspiration with trials of ice, thin liquids and NT liquid trials  -          Recommendations    Therapy Frequency (Swallow)  PRN  -  PRN  -       Predicted Duration Therapy Intervention (Days)  until discharge  -  until discharge  -       SLP Diet Recommendation  NPO;temporary alternate methods of nutrition/hydration;long term alternate methods of nutrition/hydration  -  NPO;temporary alternate methods of nutrition/hydration;long term alternate methods of nutrition/hydration;other (see comments)  -       Recommended Precautions and Strategies  general aspiration precautions  -  general aspiration precautions  -       Oral Care Recommendations  Oral Care BID/PRN;Suction toothbrush  -  Oral Care BID/PRN;Suction toothbrush  -       SLP Rec. for Method of Medication Administration  meds via alternate route  -  meds via alternate route  -       Monitor for Signs of Aspiration  yes;notify SLP if any concerns  -  yes;notify SLP if any concerns  -       Anticipated Discharge Disposition (SLP)  unknown;anticipate therapy at next level of care  -  unknown;anticipate therapy at next level of care  -         User Key  (r) = Recorded By, (t) = Taken By, (c) = Cosigned By    Initials Name Effective Dates    Radha Savage, MS CCC-SLP 06/16/21 -           EDUCATION  The patient has been educated in the following areas:   Dysphagia (Swallowing Impairment) Oral Care/Hydration NPO rationale.       SLP GOALS     Row  Name 10/05/21 1300 10/04/21 1300          Oral Nutrition/Hydration Goal 1 (SLP)    Oral Nutrition/Hydration Goal 1, SLP  LTG: Pt will return to PO diet w/o s/s of aspiration w/ 100% accuracy w/o cues  -CH  LTG: Pt will return to PO diet w/o s/s of aspiration w/ 100% accuracy w/o cues  -CH     Time Frame (Oral Nutrition/Hydration Goal 1, SLP)  by discharge  -CH  by discharge  -CH     Progress/Outcomes (Oral Nutrition/Hydration Goal 1, SLP)  progress slower than expected  -CH  progress slower than expected  -CH        Oral Nutrition/Hydration Goal 2 (SLP)    Oral Nutrition/Hydration Goal 2, SLP  Pt will tolerate therapeutic H2O trials w/o s/s of aspiration w/ 60% accuracy to indicate readiness for repeat instrumental eval.  -CH  Pt will tolerate therapeutic H2O trials w/o s/s of aspiration w/ 60% accuracy to indicate readiness for repeat instrumental eval.  -CH     Time Frame (Oral Nutrition/Hydration Goal 2, SLP)  short term goal (STG)  -CH  short term goal (STG)  -CH     Barriers (Oral Nutrition/Hydration Goal 2, SLP)  delayed cough following trials of ice chips and immediate cough following thin H2O via teaspoon. Multiple swallows with pureed trials.  -CH  Continued overt s/s of aspiration w/ therapeutic ice, thin and nectar thick liquid trials c/b wet vocal quality & coughing w/ suctioning. Not ready for repeat instrumental eval at this time  -CH     Progress/Outcomes (Oral Nutrition/Hydration Goal 2, SLP)  progress slower than expected  -CH  progress slower than expected  -CH       User Key  (r) = Recorded By, (t) = Taken By, (c) = Cosigned By    Initials Name Provider Type    Radha Savage, MS CCC-SLP Speech and Language Pathologist             Time Calculation:   Time Calculation- SLP     Row Name 10/05/21 1424             Time Calculation- SLP    SLP Start Time  1300  -CH      SLP Received On  10/05/21  -         Untimed Charges    [id number] Treatment Swallow Minutes  38  -CH         Total Minutes     Untimed Charges Total Minutes  38  -CH       Total Minutes  38  -CH        User Key  (r) = Recorded By, (t) = Taken By, (c) = Cosigned By    Initials Name Provider Type    Radha Savage MS CCC-SLP Speech and Language Pathologist          Therapy Charges for Today     Code Description Service Date Service Provider Modifiers Qty    63485897736 HC ST TREATMENT SWALLOW 2 10/4/2021 Radha Kapoor MS CCC-SLP GN 1    [id number] HC ST TREATMENT SWALLOW 3 10/5/2021 Radah Kapoor MS CCC-SLP GN 1               MS MINAL Lombardi  10/5/2021       Patient was not wearing a face mask and did exhibit coughing during this therapy encounter.  Procedure performed was aerosolizing, involved close contact (within 6 feet for at least 15 minutes or longer), and did not involve contact with infectious secretions or specimens.  Therapist used appropriate personal protective equipment including gloves, standard procedure mask and eye protection.  Appropriate PPE was worn during the entire therapy session.  Hand hygiene was completed before and after therapy session.       Electronically signed by Radha Kapoor MS CCC-SLP at 10/05/21 1428     Radha Kapoor MS CCC-SLP at 10/04/21 1309        Goal Outcome Evaluation:     SLP treatment completed. Will continue to address dysphagia as appropriate pending goals of care decisions. Please see note for further details and recommendations.                  Electronically signed by Radha Kapoor MS CCC-SLP at 10/04/21 1310     Radha Kapoor MS CCC-SLP at 10/04/21 1307          Acute Care - Speech Language Pathology   Swallow Treatment Note [facility]     Patient Name: Rao Alcazar  : 1945  MRN: [id number]  Today's Date: 10/4/2021               Admit Date: 2021    Visit Dx:     ICD-10-CM ICD-9-CM   1. Pneumonia of both lower lobes due to infectious organism  J18.9 486   2. Acute on chronic respiratory failure with hypoxia (HCC)  J96.21  518.84     799.02   3. Oropharyngeal dysphagia  R13.12 787.22     Patient Active Problem List   Diagnosis   • Atypical chest pain   • Cardiomegaly   • Congestive heart failure (HCC)   • Shortness of breath   • Cardiac mass   • Chest pain   • Mental disorder   • History of panic attacks   • History of seizure disorder   • Mental retardation   • Hyperlipidemia   • Pneumonia of both lower lobes due to infectious organism   • Chronic diastolic CHF (congestive heart failure) (HCC)     Past Medical History:   Diagnosis Date   • Atypical chest pain    • Cardiomegaly    • CHF (congestive heart failure) (CMS/HCC)    • History of panic attacks    • History of seizure disorder    • Mental disorder    • Mental retardation    • Panic attacks    • Seizures (CMS/HCC)      Past Surgical History:   Procedure Laterality Date   • EXPLORATORY LAPAROTOMY         SLP Recommendation and Plan  SLP Swallowing Diagnosis: mod-severe, oral dysphagia, severe, pharyngeal dysphagia (10/04/21 1130)  SLP Diet Recommendation: NPO, temporary alternate methods of nutrition/hydration, long term alternate methods of nutrition/hydration, other (see comments) (10/04/21 1130)  Recommended Precautions and Strategies: general aspiration precautions (10/04/21 1130)  SLP Rec. for Method of Medication Administration: meds via alternate route (10/04/21 1130)     Monitor for Signs of Aspiration: yes, notify SLP if any concerns (10/04/21 1130)     Swallow Criteria for Skilled Therapeutic Interventions Met: demonstrates skilled criteria (10/04/21 1130)  Anticipated Discharge Disposition (SLP): unknown, anticipate therapy at next level of care (10/04/21 1130)  Rehab Potential/Prognosis, Swallowing: adequate, monitor progress closely (10/04/21 1130)  Therapy Frequency (Swallow): PRN (10/04/21 1130)  Predicted Duration Therapy Intervention (Days): until discharge (10/04/21 1130)     Daily Summary of Progress (SLP): progress toward functional goals as expected  (10/04/21 1130)    Plan for Continued Treatment (SLP): Patient continues with overt clinical s/s of aspiration with trials of ice, thin liquids and NT liquid trials (10/04/21 1130)                        SWALLOW EVALUATION (last 72 hours)      SLP Adult Swallow Evaluation     Row Name 10/04/21 1130 10/01/21 1440                Rehab Evaluation    Document Type  therapy note (daily note)  -CH  therapy note (daily note)  -EN       Subjective Information  no complaints  -CH  no complaints  -EN       Patient Observations  alert;cooperative  -CH  cooperative  -EN       Patient/Family/Caregiver Comments/Observations  No family present  -CH  no family present   -EN       Care Plan Review  evaluation/treatment results reviewed;care plan/treatment goals reviewed;risks/benefits reviewed;current/potential barriers reviewed;patient/other agree to care plan  -CH  evaluation/treatment results reviewed;care plan/treatment goals reviewed;risks/benefits reviewed;current/potential barriers reviewed;patient/other agree to care plan  -EN       Patient Effort  adequate  -CH  adequate  -EN       Symptoms Noted During/After Treatment  none  -CH  none  -EN          General Information    Patient Profile Reviewed  yes  -CH  yes  -EN          Pain    Additional Documentation  Pain Scale: FACES Pre/Post-Treatment (Group)  -CH  Pain Scale: FACES Pre/Post-Treatment (Group)  -EN          Pain Scale: FACES Pre/Post-Treatment    Pain: FACES Scale, Pretreatment  0-->no hurt  -CH  0-->no hurt  -EN       Posttreatment Pain Rating  0-->no hurt  -CH  0-->no hurt  -EN          Clinical Impression    Daily Summary of Progress (SLP)  progress toward functional goals as expected  -CH  progress toward functional goals as expected  -EN       SLP Swallowing Diagnosis  mod-severe;oral dysphagia;severe;pharyngeal dysphagia  -CH  --       Functional Impact  risk of aspiration/pneumonia;risk of malnutrition;risk of dehydration  -CH  risk of  aspiration/pneumonia;risk of malnutrition;risk of dehydration  -EN       Rehab Potential/Prognosis, Swallowing  adequate, monitor progress closely  -  adequate, monitor progress closely  -EN       Swallow Criteria for Skilled Therapeutic Interventions Met  demonstrates skilled criteria  -CH  demonstrates skilled criteria  -EN       Plan for Continued Treatment (SLP)  Patient continues with overt clinical s/s of aspiration with trials of ice, thin liquids and NT liquid trials  -CH  Continued s/s of aspiration during all trials of thin liquids and ice chips this date. Wet vocal quality and coughing present throughout. Safest recommendation continues to be NPO w/ alternate route of nutrition. Continue to follow.   -EN          Recommendations    Therapy Frequency (Swallow)  PRN  -CH  PRN  -EN       Predicted Duration Therapy Intervention (Days)  until discharge  -CH  until discharge  -EN       SLP Diet Recommendation  NPO;temporary alternate methods of nutrition/hydration;long term alternate methods of nutrition/hydration;other (see comments)  -  NPO;temporary alternate methods of nutrition/hydration;long term alternate methods of nutrition/hydration;other (see comments) per MD discretion  -EN       Recommended Precautions and Strategies  general aspiration precautions  -  general aspiration precautions  -EN       Oral Care Recommendations  Oral Care BID/PRN;Suction toothbrush  -  Oral Care BID/PRN;Suction toothbrush  -EN       SLP Rec. for Method of Medication Administration  meds via alternate route  -  meds via alternate route  -EN       Monitor for Signs of Aspiration  yes;notify SLP if any concerns  -  yes;notify SLP if any concerns  -EN       Anticipated Discharge Disposition (SLP)  unknown;anticipate therapy at next level of care  -  unknown;anticipate therapy at next level of care  -EN         User Key  (r) = Recorded By, (t) = Taken By, (c) = Cosigned By    Initials Name Effective Dates      Radha Kapoor MS CCC-SLP 06/16/21 -     EN Radhkia Resendez MS, ROSENDO-SLP 05/20/21 -           EDUCATION  The patient has been educated in the following areas:   Dysphagia (Swallowing Impairment) Oral Care/Hydration NPO rationale.       SLP GOALS     Row Name 10/04/21 1300 10/01/21 1440          Oral Nutrition/Hydration Goal 1 (SLP)    Oral Nutrition/Hydration Goal 1, SLP  LTG: Pt will return to PO diet w/o s/s of aspiration w/ 100% accuracy w/o cues  -CH  LTG: Pt will return to PO diet w/o s/s of aspiration w/ 100% accuracy w/o cues  -EN     Time Frame (Oral Nutrition/Hydration Goal 1, SLP)  by discharge  -CH  by discharge  -EN     Progress/Outcomes (Oral Nutrition/Hydration Goal 1, SLP)  progress slower than expected  -CH  progress slower than expected  -EN        Oral Nutrition/Hydration Goal 2 (SLP)    Oral Nutrition/Hydration Goal 2, SLP  Pt will tolerate therapeutic H2O trials w/o s/s of aspiration w/ 60% accuracy to indicate readiness for repeat instrumental eval.  -CH  Pt will tolerate therapeutic H2O trials w/o s/s of aspiration w/ 60% accuracy to indicate readiness for repeat instrumental eval.  -EN     Time Frame (Oral Nutrition/Hydration Goal 2, SLP)  short term goal (STG)  -CH  short term goal (STG)  -EN     Barriers (Oral Nutrition/Hydration Goal 2, SLP)  Continued overt s/s of aspiration w/ therapeutic ice, thin and nectar thick liquid trials c/b wet vocal quality & coughing w/ suctioning. Not ready for repeat instrumental eval at this time  -CH  --     Progress/Outcomes (Oral Nutrition/Hydration Goal 2, SLP)  progress slower than expected  -CH  progress slower than expected  -EN       User Key  (r) = Recorded By, (t) = Taken By, (c) = Cosigned By    Initials Name Provider Type    CH Radha Kapoor, MS CCC-SLP Speech and Language Pathologist    EN Radhika Resendez MS, CFY-SLP Speech and Language Pathologist             Time Calculation:   Time Calculation- SLP     Row Name 10/04/21 9121              Time Calculation- SLP    SLP Start Time  1130  -CH      SLP Received On  10/04/21  -CH         Untimed Charges    [id number] Treatment Swallow Minutes  33  -CH         Total Minutes    Untimed Charges Total Minutes  33  -CH       Total Minutes  33  -CH        User Key  (r) = Recorded By, (t) = Taken By, (c) = Cosigned By    Initials Name Provider Type     Radha Kapoor MS CCC-SLP Speech and Language Pathologist          Therapy Charges for Today     Code Description Service Date Service Provider Modifiers Qty    14192046429 HC ST TREATMENT SWALLOW 2 10/4/2021 Radha Kapoor MS CCC-SLP GN 1               MS MINAL Lombardi  10/4/2021       Patient was not wearing a face mask and did exhibit coughing during this therapy encounter.  Procedure performed was aerosolizing, involved close contact (within 6 feet for at least 15 minutes or longer), and did not involve contact with infectious secretions or specimens.  Therapist used appropriate personal protective equipment including gloves, standard procedure mask and eye protection.  Appropriate PPE was worn during the entire therapy session.  Hand hygiene was completed before and after therapy session.       Electronically signed by Radha Kapoor MS CCC-SLP at 10/04/21 0233

## 2021-10-05 NOTE — PROGRESS NOTES
Continued Stay Note  [facility]     Patient Name: Rao Alcazar  MRN: [id number]  Today's Date: 10/5/2021    Admit Date: 9/24/2021    Discharge Plan     Row Name 10/05/21 1953       Plan    Plan  Home with Hospice    Patient/Family in Agreement with Plan  yes    Plan Comments  Hospice consult reviewed. Chart reviewed. Pt is out of the [facility] hospice service area. If the pt.’s demographics are correct, he lives in the service area of [facility]. Call has been placed to the pt.’s caregiver, Ariana, to confirm the pt.’s address, no answer, voice mail left. Referral has been sent to [facility]. Visit made with pt & no family @ [facility]. Pt.’s ability to communicate is impaired. Pt did appear comfort w/o s/s of pain/distressed noted. Hospice will f/u with caregiver tomorrow & [facility]. Hospice will continue to follow. If further assistance is needed, please feel free to call [phone].        Discharge Codes    No documentation.       Expected Discharge Date and Time     Expected Discharge Date Expected Discharge Time    Oct 1, 2021             Rosanna Alcazar

## 2021-10-05 NOTE — PROGRESS NOTES
"    [facility] Medicine Services  PROGRESS NOTE    Patient Name: Rao Alcazar  : 1945  MRN: [id number]    Date of Admission: 2021  Primary Care Physician: Provider, No Known    Subjective   Subjective     CC: f/u dysphagia    HPI:   Resting up in bed awake and alert.  Frail chronically ill-appearing.  Generally nonverbal.  When I say good morning Fátima did repeat \"good morning\" mumbling.  No other verbal.  No new issues per staff.    ROS:  UTO due to AMS.     Objective   Objective     Vital Signs:   Temp:  [97 °F (36.1 °C)-97.9 °F (36.6 °C)] 97.8 °F (36.6 °C)  Heart Rate:  [62-74] 70  Resp:  [16-18] 18  BP: (106-148)/(58-76) 140/76  Flow (L/min):  [2] 2     Physical Exam:  Constitutional: No acute distress, awake, alert.  Resting up in bed.  Chronically ill and frail appearing  HENT: NCAT, mucous membranes moist  Respiratory: Clear to auscultation bilaterally, respiratory effort normal   Cardiovascular: RRR, no murmurs, rubs, or gallops  Gastrointestinal: Positive bowel sounds, soft, nontender, nondistended  Musculoskeletal: No bilateral ankle edema.  Franco spontaneously.  Psychiatric: ADDIE  Neurologic: ADDIE  Skin: No rashes    Results Reviewed:  LAB RESULTS:      Lab 10/04/21  0653 21  1205   WBC 4.28 3.85   HEMOGLOBIN 14.9 14.4   HEMATOCRIT 44.1 42.5   PLATELETS 227 187   NEUTROS ABS 2.11 1.79   IMMATURE GRANS (ABS) 0.02 0.01   LYMPHS ABS 1.28 1.24   MONOS ABS 0.50 0.53   EOS ABS 0.34 0.26   MCV 93.0 93.2         Lab 10/04/21  0653 10/02/21  1534 21  1205   SODIUM 134* 137 138   POTASSIUM 3.8 3.8 3.7   CHLORIDE 99 100 97*   CO2 27.0 32.0* 31.0*   ANION GAP 8.0 5.0 10.0   BUN 4* 3* 4*   CREATININE 0.42* 0.38* 0.37*   GLUCOSE 101* 115* 126*   CALCIUM 8.8 8.9 8.8   MAGNESIUM 1.9  --  1.8   PHOSPHORUS 3.0  --   --          Lab 10/04/21  0653   TOTAL PROTEIN 7.6   ALBUMIN 3.30*   ALT (SGPT) 30   AST (SGOT) 32   BILIRUBIN 0.4   ALK PHOS 134*             Lab 10/04/21  0653 "   CHOLESTEROL 177   TRIGLYCERIDES 136             Brief Urine Lab Results  (Last result in the past 365 days)      Color   Clarity   Blood   Leuk Est   Nitrite   Protein   CREAT   Urine HCG        09/24/21 1203 Yellow Clear Negative Negative Negative Negative               Microbiology Results Abnormal     Procedure Component Value - Date/Time    Blood Culture - Blood, Arm, Right [[id number]] Collected: 09/24/21 1150    Lab Status: Final result Specimen: Blood from Arm, Right Updated: 09/29/21 1231     Blood Culture No growth at 5 days    Blood Culture - Blood, Arm, Left [[id number]] Collected: 09/24/21 1150    Lab Status: Final result Specimen: Blood from Arm, Left Updated: 09/29/21 1231     Blood Culture No growth at 5 days    COVID PRE-OP / PRE-PROCEDURE SCREENING ORDER (NO ISOLATION) - Swab, Nasopharynx [[id number]]  (Normal) Collected: 09/24/21 1208    Lab Status: Final result Specimen: Swab from Nasopharynx Updated: 09/24/21 1236    Narrative:      The following orders were created for panel order COVID PRE-OP / PRE-PROCEDURE SCREENING ORDER (NO ISOLATION) - Swab, Nasopharynx.  Procedure                               Abnormality         Status                     ---------                               -----------         ------                     COVID-19 and FLU A/B PCR...[[id number]]  Normal              Final result                 Please view results for these tests on the individual orders.    COVID-19 and FLU A/B PCR - Swab, Nasopharynx [[id number]]  (Normal) Collected: 09/24/21 1208    Lab Status: Final result Specimen: Swab from Nasopharynx Updated: 09/24/21 1236     COVID19 Not Detected     Influenza A PCR Not Detected     Influenza B PCR Not Detected    Narrative:      Fact sheet for providers: https://www.fda.gov/media/997688/download    Fact sheet for patients: https://www.fda.gov/media/305264/download    Test performed by PCR.        CXR personally reviewed showing bilateral opacities. Agree with  interpretation.    Results for orders placed during the hospital encounter of 09/24/21    Adult Transthoracic Echo Complete W/ Cont if Necessary Per Protocol    Interpretation Summary  · Normal left ventricular systolic function, estimated EF 60%.  · Aortic sclerosis without aortic stenosis.  · Trace aortic insufficiency.  · Trace mitral regurgitation, trace tricuspid regurgitation, normal RVSP.      I have reviewed the medications:  Scheduled Meds:aspirin, 81 mg, Nasogastric, Daily   Or  aspirin, 300 mg, Rectal, Daily  atorvastatin, 10 mg, Nasogastric, Nightly  fosphenytoin, 100 mg PE, Intravenous, Q6H  heparin (porcine), 5,000 Units, Subcutaneous, Q8H  levETIRAcetam, 2,000 mg, Intravenous, Q12H  Levothyroxine Sodium, 20 mcg, Intravenous, Daily  palliative care oral rinse, , Mouth/Throat, TID - RT  PRO-STAT, 30 mL, Nasogastric, Daily  senna-docusate sodium, 2 tablet, Nasogastric, BID  sodium chloride, 10 mL, Intravenous, Q12H      Continuous Infusions:dextrose 5 % with KCl 20 mEq, 75 mL/hr, Last Rate: 75 mL/hr (10/03/21 1911)      PRN Meds:.•  acetaminophen  •  senna-docusate sodium **AND** polyethylene glycol **AND** bisacodyl **AND** bisacodyl  •  sodium chloride  •  sodium chloride    Assessment/Plan   Assessment & Plan     Active Hospital Problems    Diagnosis  POA   • Pneumonia of both lower lobes due to infectious organism [J18.9]  Yes   • Chronic diastolic CHF (congestive heart failure) (HCC) [I50.32]  Unknown   • Hyperlipidemia [E78.5]  Yes   • Mental disorder [F99]  Yes   • History of seizure disorder [Z86.69]  Not Applicable   • Cardiomegaly [I51.7]  Yes      Resolved Hospital Problems   No resolved problems to display.        Brief Hospital Course to date:  Rao Alcazar is a 76 y.o. male patient with severe intellectual disability, nonverbal at baseline (guardian of state), with history of dyslipidemia, seizure disorder and chronic hypoxic respiratory failure on home oxygen at 2 L/min presented to the  "hospital with fever and cough and was found to have bilateral basal pneumonia. This is my first day assessing patient's active medical issues.     This patient's problems and plans were partially entered by my partner and updated as appropriate by me 10/05/21.    Assessment/plan:  Patient is new to me today    [facility]  --My partner prior had d/w patient's family. This patient suffers from chronic conditions such as seizure disorder, cognitive delay and heart failure. DNR/DNI is appropriate as this would only prolong suffering and would most likely not be successful - guardian in agreement - changed to DNR/DNI.  --Most currently this patient has been diagnosed with severe dysphagia. He has been receiving nutrition via an NG tube which is temporary but he has pulled at this tube and continues trying to push it out with his mouth. His sitter, whom knows him well, states that he \"lives to eat\". A comfort diet along with comfort measures would be most appropriate as a PEG placement and any further aggressive treatment would only prolong suffering. He is progressive in his chronic conditions with no improvement foreseen. Awaiting guardian decision regarding this.     Aspiration pneumonia  Dysphagia   -CT chest showed bilateral pulmonary infiltrates and pleural effusion, concerning for community acquired pneumonia  -Has completed course of IV abx.  --Awaiting decision from guardian regarding comfort diet versus PEG.     Seizure disorder  -Keppra and Dilantin  -Seizure precautions, stable     Elevated alk phos  --improved     Diastolic heart failure, not in decompensation  -echo EF 60%, aortic stenosis, normal RVSP   -Compensated. Continue aspirin     Hyperlipidemia  -Continue statin     Severe intellectual disability   Nonverbal   --Stable to baseline    DVT prophylaxis:  Medical DVT prophylaxis orders are present.       AM-PAC 6 Clicks Score (PT): 6 (10/04/21 1945)    Disposition: I expect the patient to be discharged TBD.  " Pending guardianship decision regarding comfort diet versus PEG with social work following.    CODE STATUS:   Code Status and Medical Interventions:   Ordered at: 09/30/21 1753     Limited Support to NOT Include:    Intubation    Cardioversion/Defibrillation     Code Status:    No CPR     Medical Interventions (Level of Support Prior to Arrest):    Limited       Fidelia Mcintyre, APRN  10/05/21

## 2021-10-06 LAB
GLUCOSE BLDC GLUCOMTR-MCNC: 121 MG/DL (ref 70–130)
GLUCOSE BLDC GLUCOMTR-MCNC: 121 MG/DL (ref 70–130)
GLUCOSE BLDC GLUCOMTR-MCNC: 122 MG/DL (ref 70–130)
GLUCOSE BLDC GLUCOMTR-MCNC: 97 MG/DL (ref 70–130)

## 2021-10-06 PROCEDURE — 25810000003 DEXTROSE 5 % WITH KCL 20 MEQ 20-5 MEQ/L-% SOLUTION: Performed by: NURSE PRACTITIONER

## 2021-10-06 PROCEDURE — 25010000002 LEVETRIRACETAM PER 10 MG: Performed by: NURSE PRACTITIONER

## 2021-10-06 PROCEDURE — 99232 SBSQ HOSP IP/OBS MODERATE 35: CPT | Performed by: NURSE PRACTITIONER

## 2021-10-06 PROCEDURE — 82962 GLUCOSE BLOOD TEST: CPT

## 2021-10-06 PROCEDURE — 25010000002 FOSPHENYTOIN 100 MG PE/2ML SOLUTION 2 ML VIAL: Performed by: NURSE PRACTITIONER

## 2021-10-06 PROCEDURE — 92526 ORAL FUNCTION THERAPY: CPT

## 2021-10-06 PROCEDURE — 25010000002 HEPARIN (PORCINE) PER 1000 UNITS: Performed by: NURSE PRACTITIONER

## 2021-10-06 RX ORDER — POLYETHYLENE GLYCOL 3350 17 G/17G
17 POWDER, FOR SOLUTION ORAL DAILY PRN
Status: DISCONTINUED | OUTPATIENT
Start: 2021-10-06 | End: 2021-10-19 | Stop reason: HOSPADM

## 2021-10-06 RX ORDER — LEVETIRACETAM 100 MG/ML
2000 SOLUTION ORAL EVERY 12 HOURS SCHEDULED
Status: DISCONTINUED | OUTPATIENT
Start: 2021-10-06 | End: 2021-10-19 | Stop reason: HOSPADM

## 2021-10-06 RX ORDER — AMOXICILLIN 250 MG
2 CAPSULE ORAL 2 TIMES DAILY
Status: DISCONTINUED | OUTPATIENT
Start: 2021-10-06 | End: 2021-10-19 | Stop reason: HOSPADM

## 2021-10-06 RX ORDER — ASPIRIN 300 MG/1
300 SUPPOSITORY RECTAL DAILY
Status: DISCONTINUED | OUTPATIENT
Start: 2021-10-07 | End: 2021-10-19 | Stop reason: HOSPADM

## 2021-10-06 RX ORDER — LEVOTHYROXINE SODIUM 0.03 MG/1
25 TABLET ORAL
Status: DISCONTINUED | OUTPATIENT
Start: 2021-10-07 | End: 2021-10-19 | Stop reason: HOSPADM

## 2021-10-06 RX ORDER — BISACODYL 5 MG/1
5 TABLET, DELAYED RELEASE ORAL DAILY PRN
Status: DISCONTINUED | OUTPATIENT
Start: 2021-10-06 | End: 2021-10-19 | Stop reason: HOSPADM

## 2021-10-06 RX ORDER — ACETAMINOPHEN 160 MG/5ML
650 SOLUTION ORAL EVERY 4 HOURS PRN
Status: DISCONTINUED | OUTPATIENT
Start: 2021-10-06 | End: 2021-10-19 | Stop reason: HOSPADM

## 2021-10-06 RX ORDER — ASPIRIN 81 MG/1
81 TABLET, CHEWABLE ORAL DAILY
Status: DISCONTINUED | OUTPATIENT
Start: 2021-10-07 | End: 2021-10-19 | Stop reason: HOSPADM

## 2021-10-06 RX ORDER — ATORVASTATIN CALCIUM 10 MG/1
10 TABLET, FILM COATED ORAL NIGHTLY
Status: DISCONTINUED | OUTPATIENT
Start: 2021-10-06 | End: 2021-10-19 | Stop reason: HOSPADM

## 2021-10-06 RX ORDER — BISACODYL 10 MG
10 SUPPOSITORY, RECTAL RECTAL DAILY PRN
Status: DISCONTINUED | OUTPATIENT
Start: 2021-10-06 | End: 2021-10-19 | Stop reason: HOSPADM

## 2021-10-06 RX ORDER — PHENYTOIN 125 MG/5ML
125 SUSPENSION ORAL 3 TIMES DAILY
Status: DISCONTINUED | OUTPATIENT
Start: 2021-10-06 | End: 2021-10-19 | Stop reason: HOSPADM

## 2021-10-06 RX ADMIN — HEPARIN SODIUM 5000 UNITS: 5000 INJECTION, SOLUTION INTRAVENOUS; SUBCUTANEOUS at 20:06

## 2021-10-06 RX ADMIN — ATORVASTATIN CALCIUM 10 MG: 10 TABLET, FILM COATED ORAL at 20:06

## 2021-10-06 RX ADMIN — HEPARIN SODIUM 5000 UNITS: 5000 INJECTION, SOLUTION INTRAVENOUS; SUBCUTANEOUS at 14:59

## 2021-10-06 RX ADMIN — DOCUSATE SODIUM 50 MG AND SENNOSIDES 8.6 MG 2 TABLET: 8.6; 5 TABLET, FILM COATED ORAL at 20:05

## 2021-10-06 RX ADMIN — MINERAL OIL: 1000 LIQUID ORAL at 14:59

## 2021-10-06 RX ADMIN — SODIUM CHLORIDE 100 MG PE: 9 INJECTION, SOLUTION INTRAVENOUS at 05:00

## 2021-10-06 RX ADMIN — PHENYTOIN 125 MG: 125 SUSPENSION ORAL at 17:00

## 2021-10-06 RX ADMIN — MINERAL OIL: 1000 LIQUID ORAL at 10:00

## 2021-10-06 RX ADMIN — MINERAL OIL: 1000 LIQUID ORAL at 22:59

## 2021-10-06 RX ADMIN — HEPARIN SODIUM 5000 UNITS: 5000 INJECTION, SOLUTION INTRAVENOUS; SUBCUTANEOUS at 05:00

## 2021-10-06 RX ADMIN — SODIUM CHLORIDE, PRESERVATIVE FREE 10 ML: 5 INJECTION INTRAVENOUS at 20:06

## 2021-10-06 RX ADMIN — LEVETIRACETAM 2000 MG: 100 INJECTION, SOLUTION INTRAVENOUS at 09:52

## 2021-10-06 RX ADMIN — LEVETIRACETAM 2000 MG: 100 SOLUTION ORAL at 20:05

## 2021-10-06 RX ADMIN — ASPIRIN 81 MG CHEWABLE TABLET 81 MG: 81 TABLET CHEWABLE at 09:52

## 2021-10-06 RX ADMIN — POTASSIUM CHLORIDE AND DEXTROSE MONOHYDRATE 75 ML/HR: 150; 5 INJECTION, SOLUTION INTRAVENOUS at 10:01

## 2021-10-06 RX ADMIN — PHENYTOIN 125 MG: 125 SUSPENSION ORAL at 20:06

## 2021-10-06 RX ADMIN — DOCUSATE SODIUM 50 MG AND SENNOSIDES 8.6 MG 2 TABLET: 8.6; 5 TABLET, FILM COATED ORAL at 09:52

## 2021-10-06 RX ADMIN — SODIUM CHLORIDE, PRESERVATIVE FREE 10 ML: 5 INJECTION INTRAVENOUS at 09:52

## 2021-10-06 RX ADMIN — SODIUM CHLORIDE 100 MG PE: 9 INJECTION, SOLUTION INTRAVENOUS at 11:12

## 2021-10-06 RX ADMIN — LEVOTHYROXINE SODIUM 20 MCG: 20 INJECTION, SOLUTION INTRAVENOUS at 10:01

## 2021-10-06 NOTE — CASE MANAGEMENT/SOCIAL WORK
Continued Stay Note  [facility]     Patient Name: Rao Alcazar  MRN: [id number]  Today's Date: 10/6/2021    Admit Date: 9/24/2021    Discharge Plan     Row Name 10/06/21 1617       Plan    Plan Comments  SW spoke with pt's guardian, Veronica Jalen [phone] and provided update on pt as she asked if pt was going to go home with Hospice support. Hospice also following. Pt's [place] team ( and community support) meeting today to discuss pt's plan of care and then going to follow up with Hospice Case manager. MSW remains available.        Discharge Codes    No documentation.       Expected Discharge Date and Time     Expected Discharge Date Expected Discharge Time    Oct 1, 2021             CHELSEY Jurado

## 2021-10-06 NOTE — PLAN OF CARE
Goal Outcome Evaluation:  Plan of Care Reviewed With: patient  Progress: no change   SLP treatment completed. Will sign-off as no further SLP dysphagia needs at this time. Re-consult if any changes. Please see note for further details and recommendations.

## 2021-10-06 NOTE — PLAN OF CARE
Goal Outcome Evaluation:           Progress: no change  Outcome Summary: Pt is awake and alert. pt tries to answer but is difficult to understand. Pt is on a comfort diet ( previous diet at facility pureed nectar thick). Pt ate grits and pudding for breakfast per nursing with assist. Hospice following. Pt is comfort measures.Team Meeting [address] Roxane FRANK, El Vázquez DO, Awilda Lira RN CHPN, CHPN, Svetlana Carrillo LCSW, Steph Otto RN CHPN

## 2021-10-06 NOTE — PROGRESS NOTES
Continued Stay Note  [facility]     Patient Name: Rao Alcazar  MRN: [id number]  Today's Date: 10/6/2021    Admit Date: 9/24/2021    Discharge Plan     Row Name 10/06/21 9236       Plan    Plan  Ongoing    Plan Comments  Telephone call received from Vandana,  with [facility]. Vandana is the  at the [facility] in [place] where pt resides. Vandana stated has had a meeting this afternoon to discuss pt's condition and discharge plans, Vandana had questions regarding hospice services, teaching done on hospice philosophy, services and goals of care. Vandana stated needs to talk with pt's state guardian Veronica Rao to discuss hospice for pt when pt is discharged. Vandana stated will call this writer after speaking with Veronica. Will continue to follow. Please call [phone] if can be of further assistance.    Row Name 10/06/21 1588       Plan    Plan Comments      Discharge Codes    No documentation.       Expected Discharge Date and Time     Expected Discharge Date Expected Discharge Time    Oct 1, 2021             Susan George RN

## 2021-10-06 NOTE — PROGRESS NOTES
Clinical Nutrition     Patient Name: Rao Alcazar  YOB: 1945  MRN: [id number]  Date of Encounter: 10/06/21 11:04 EDT  Admission date: 9/24/2021    Reason for Visit   Follow-up protocol    EMR  Reviewed   Yes  SLP eval on 10/5     SLP Recommendation and Plan (10/5)  SLP Swallowing Diagnosis: mod-severe, oral dysphagia, severe, pharyngeal dysphagia   SLP Diet Recommendation: NPO, temporary alternate methods of nutrition/hydration, long term alternate methods of nutrition/hydration     Reported/Observed/Food/Nutrition Related - Comments     RD notes plan for home with hospice and comfort measures. Comfort diet initiated 10/5. LBM 10/6.     Current Nutrition Prescription     Diet Pureed; Cardiac    Intake: insufficient data    Actions     Care plan reviewed     RD will sign off at this time. Please consult if needed.    Jimmy Chacon RD  Time Spent: 15 min

## 2021-10-06 NOTE — PROGRESS NOTES
[facility] Medicine Services  PROGRESS NOTE    Patient Name: Rao Alcazar  : 1945  MRN: [id number]    Date of Admission: 2021  Primary Care Physician: Provider, No Known    Subjective   Subjective     CC: f/u dysphagia    HPI:   Patient seen resting up in bed awake alert.  Minimally interactive.  [place]umbles but unable to understand any words.  Tolerating comfort diet with feed assist per staff.  No new issues.    ROS:  UTO due to AMS.     Objective   Objective     Vital Signs:   Temp:  [96 °F (35.6 °C)-98.2 °F (36.8 °C)] 97.1 °F (36.2 °C)  Heart Rate:  [61-72] 61  Resp:  [16-18] 16  BP: ()/(57-91) 113/59  Flow (L/min):  [2-4] 2     Physical Exam:  Constitutional: No acute distress, awake, alert.  Resting up in bed.  Chronically ill and frail appearing  HENT: NCAT, mucous membranes moist  Respiratory: Clear to auscultation bilaterally but slightly decreased at bases, respiratory effort normal   Cardiovascular: RRR, no murmurs, rubs, or gallops  Gastrointestinal: Positive bowel sounds, soft, nontender, nondistended  Musculoskeletal: No bilateral ankle edema.  Franco spontaneously.  Psychiatric: ADDIE  Neurologic: ADDIE  Skin: No rashes    Results Reviewed:  LAB RESULTS:      Lab 10/04/21  0653 21  1205   WBC 4.28 3.85   HEMOGLOBIN 14.9 14.4   HEMATOCRIT 44.1 42.5   PLATELETS 227 187   NEUTROS ABS 2.11 1.79   IMMATURE GRANS (ABS) 0.02 0.01   LYMPHS ABS 1.28 1.24   MONOS ABS 0.50 0.53   EOS ABS 0.34 0.26   MCV 93.0 93.2         Lab 10/04/21  0653 10/02/21  1534 21  1205   SODIUM 134* 137 138   POTASSIUM 3.8 3.8 3.7   CHLORIDE 99 100 97*   CO2 27.0 32.0* 31.0*   ANION GAP 8.0 5.0 10.0   BUN 4* 3* 4*   CREATININE 0.42* 0.38* 0.37*   GLUCOSE 101* 115* 126*   CALCIUM 8.8 8.9 8.8   MAGNESIUM 1.9  --  1.8   PHOSPHORUS 3.0  --   --          Lab 10/04/21  0653   TOTAL PROTEIN 7.6   ALBUMIN 3.30*   ALT (SGPT) 30   AST (SGOT) 32   BILIRUBIN 0.4   ALK PHOS 134*             Lab  10/04/21  0653   CHOLESTEROL 177   TRIGLYCERIDES 136             Brief Urine Lab Results  (Last result in the past 365 days)      Color   Clarity   Blood   Leuk Est   Nitrite   Protein   CREAT   Urine HCG        09/24/21 1203 Yellow Clear Negative Negative Negative Negative               Microbiology Results Abnormal     Procedure Component Value - Date/Time    Blood Culture - Blood, Arm, Right [[id number]] Collected: 09/24/21 1150    Lab Status: Final result Specimen: Blood from Arm, Right Updated: 09/29/21 1231     Blood Culture No growth at 5 days    Blood Culture - Blood, Arm, Left [[id number]] Collected: 09/24/21 1150    Lab Status: Final result Specimen: Blood from Arm, Left Updated: 09/29/21 1231     Blood Culture No growth at 5 days    COVID PRE-OP / PRE-PROCEDURE SCREENING ORDER (NO ISOLATION) - Swab, Nasopharynx [[id number]]  (Normal) Collected: 09/24/21 1208    Lab Status: Final result Specimen: Swab from Nasopharynx Updated: 09/24/21 1236    Narrative:      The following orders were created for panel order COVID PRE-OP / PRE-PROCEDURE SCREENING ORDER (NO ISOLATION) - Swab, Nasopharynx.  Procedure                               Abnormality         Status                     ---------                               -----------         ------                     COVID-19 and FLU A/B PCR...[[id number]]  Normal              Final result                 Please view results for these tests on the individual orders.    COVID-19 and FLU A/B PCR - Swab, Nasopharynx [[id number]]  (Normal) Collected: 09/24/21 1208    Lab Status: Final result Specimen: Swab from Nasopharynx Updated: 09/24/21 1236     COVID19 Not Detected     Influenza A PCR Not Detected     Influenza B PCR Not Detected    Narrative:      Fact sheet for providers: https://www.fda.gov/media/726770/download    Fact sheet for patients: https://www.fda.gov/media/962356/download    Test performed by PCR.        CXR personally reviewed showing bilateral opacities.  Agree with interpretation.    Results for orders placed during the hospital encounter of 09/24/21    Adult Transthoracic Echo Complete W/ Cont if Necessary Per Protocol    Interpretation Summary  · Normal left ventricular systolic function, estimated EF 60%.  · Aortic sclerosis without aortic stenosis.  · Trace aortic insufficiency.  · Trace mitral regurgitation, trace tricuspid regurgitation, normal RVSP.      I have reviewed the medications:  Scheduled Meds:[START ON 10/7/2021] aspirin, 81 mg, Oral, Daily   Or  [START ON 10/7/2021] aspirin, 300 mg, Rectal, Daily  atorvastatin, 10 mg, Oral, Nightly  heparin (porcine), 5,000 Units, Subcutaneous, Q8H  levETIRAcetam, 2,000 mg, Oral, Q12H  [START ON 10/7/2021] levothyroxine, 25 mcg, Oral, Q AM  palliative care oral rinse, , Mouth/Throat, TID - RT  phenytoin, 125 mg, Oral, TID  senna-docusate sodium, 2 tablet, Oral, BID  sodium chloride, 10 mL, Intravenous, Q12H      Continuous Infusions:dextrose 5 % with KCl 20 mEq, 75 mL/hr, Last Rate: 75 mL/hr (10/06/21 1001)      PRN Meds:.•  acetaminophen  •  senna-docusate sodium **AND** polyethylene glycol **AND** bisacodyl **AND** bisacodyl  •  sodium chloride  •  sodium chloride    Assessment/Plan   Assessment & Plan     Active Hospital Problems    Diagnosis  POA   • Pneumonia of both lower lobes due to infectious organism [J18.9]  Yes   • Chronic diastolic CHF (congestive heart failure) (HCC) [I50.32]  Unknown   • Hyperlipidemia [E78.5]  Yes   • Mental disorder [F99]  Yes   • History of seizure disorder [Z86.69]  Not Applicable   • Cardiomegaly [I51.7]  Yes      Resolved Hospital Problems   No resolved problems to display.        Brief Hospital Course to date:  Rao Alcazar is a 76 y.o. male patient with severe intellectual disability, nonverbal at baseline (guardian of state), with history of dyslipidemia, seizure disorder and chronic hypoxic respiratory failure on home oxygen at 2 L/min presented to the hospital with fever  "and cough and was found to have bilateral basal pneumonia. This is my first day assessing patient's active medical issues.     This patient's problems and plans were partially entered by my partner and updated as appropriate by me 10/06/21.    Assessment/plan:    [facility]  --My partner prior had d/w patient's family. This patient suffers from chronic conditions such as seizure disorder, cognitive delay and heart failure. DNR/DNI is appropriate as this would only prolong suffering and would most likely not be successful - guardian in agreement - changed to DNR/DNI.  --Most currently this patient has been diagnosed with severe dysphagia. He has been receiving nutrition via an NG tube which is temporary but he has pulled at this tube and continues trying to push it out with his mouth. His sitter, whom knows him well, states that he \"lives to eat\". A comfort diet along with comfort measures would be most appropriate as a PEG placement and any further aggressive treatment would only prolong suffering. He is progressive in his chronic conditions with no improvement foreseen. Awaiting guardian decision regarding this.     Aspiration pneumonia  Dysphagia   -CT chest showed bilateral pulmonary infiltrates and pleural effusion, concerning for community acquired pneumonia  --Has completed course of IV abx.  --Guarding and is now decided comfort care approach.  Patient started on comfort diet.  --CM/SW following for disposition.     Seizure disorder  -Keppra and Dilantin  -Seizure precautions, stable     Elevated alk phos  --improved     Diastolic heart failure, not in decompensation  -echo EF 60%, aortic stenosis, normal RVSP   -Compensated. Continue aspirin     Hyperlipidemia  -Continue statin     Severe intellectual disability   Nonverbal   --Stable to baseline    DVT prophylaxis:  Medical DVT prophylaxis orders are present.       AM-PAC 6 Clicks Score (PT): 6 (10/05/21 2030)    Disposition: I expect the patient to be discharged " TBD.  Pending guardianship decision.  Social work following.    CODE STATUS:   Code Status and Medical Interventions:   Ordered at: 10/05/21 1420     Code Status:    No CPR     Medical Interventions (Level of Support Prior to Arrest):    Comfort Measures       Fidelia Mcintyre, APRN  10/06/21

## 2021-10-06 NOTE — THERAPY TREATMENT NOTE
Acute Care - Speech Language Pathology   Swallow Treatment Note [facility]     Patient Name: Rao Alcazar  : 1945  MRN: [id number]  Today's Date: 10/6/2021               Admit Date: 2021    Visit Dx:     ICD-10-CM ICD-9-CM   1. Pneumonia of both lower lobes due to infectious organism  J18.9 486   2. Acute on chronic respiratory failure with hypoxia (HCC)  J96.21 518.84     799.02   3. Oropharyngeal dysphagia  R13.12 787.22     Patient Active Problem List   Diagnosis   • Atypical chest pain   • Cardiomegaly   • Congestive heart failure (HCC)   • Shortness of breath   • Cardiac mass   • Chest pain   • Mental disorder   • History of panic attacks   • History of seizure disorder   • Mental retardation   • Hyperlipidemia   • Pneumonia of both lower lobes due to infectious organism   • Chronic diastolic CHF (congestive heart failure) (HCC)     Past Medical History:   Diagnosis Date   • Atypical chest pain    • Cardiomegaly    • CHF (congestive heart failure) (CMS/HCC)    • History of panic attacks    • History of seizure disorder    • Mental disorder    • Mental retardation    • Panic attacks    • Seizures (CMS/HCC)      Past Surgical History:   Procedure Laterality Date   • EXPLORATORY LAPAROTOMY         SLP Recommendation and Plan  SLP Swallowing Diagnosis: mod-severe, oral dysphagia, severe, pharyngeal dysphagia (10/06/21 1430)  SLP Diet Recommendation: puree, nectar thick liquids, other (see comments) (COMFORT DIET) (10/06/21 1430)  Recommended Precautions and Strategies: upright posture during/after eating, small bites of food and sips of liquid, no straw, general aspiration precautions, 1:1 supervision, assist with feeding (10/06/21 1430)  SLP Rec. for Method of Medication Administration: meds crushed, with pudding or applesauce, as tolerated, meds via alternate route (10/06/21 1430)     Monitor for Signs of Aspiration: yes, notify SLP if any concerns (10/06/21 1430)     Swallow Criteria for Skilled  Therapeutic Interventions Met: no significant expected improvement in functional status (10/06/21 1430)  Anticipated Discharge Disposition (SLP): unknown (10/06/21 1430)  Rehab Potential/Prognosis, Swallowing: re-evaluate goals as necessary (10/06/21 1430)  Therapy Frequency (Swallow): evaluation only (10/06/21 1430)        Daily Summary of Progress (SLP): progress toward functional goals as expected (10/06/21 1430)    Plan for Continued Treatment (SLP): Saw for dysphagia treatment for f/u on comfort diet. Pt now comfort measures & on comfort diet. Pt previously was on nectar-thick & pureed prior to admission. Given severity of s/s of discomfort & aspiration w/ thins, may be best to continue previous diet. No immediate s/s of discomfort w/ nectar-thick via tsp/cup or pureed/pudding trials. Pt unable to pull liquid via straw. Continue comfort diet w/ assist & no straws. No further SLP dysphagia needs at this time. Re-consult if any changes. (10/06/21 1430)              Plan of Care Reviewed With: patient  Progress: no change         SWALLOW EVALUATION (last 72 hours)      SLP Adult Swallow Evaluation     Row Name 10/06/21 1430             Rehab Evaluation    Document Type  therapy note (daily note)  -RD      Subjective Information  no complaints  -RD      Patient Observations  alert;cooperative  -RD      Patient/Family/Caregiver Comments/Observations  no family present  -RD      Care Plan Review  evaluation/treatment results reviewed;care plan/treatment goals reviewed;risks/benefits reviewed;current/potential barriers reviewed  -RD      Patient Effort  adequate  -RD      Symptoms Noted During/After Treatment  --         General Information    Patient Profile Reviewed  --         Pain    Additional Documentation  Pain Scale: FACES Pre/Post-Treatment (Group)  -RD         Pain Scale: FACES Pre/Post-Treatment    Pain: FACES Scale, Pretreatment  0-->no hurt  -RD      Posttreatment Pain Rating  0-->no hurt  -RD          Clinical Impression    Daily Summary of Progress (SLP)  progress toward functional goals as expected  -RD      SLP Swallowing Diagnosis  mod-severe;oral dysphagia;severe;pharyngeal dysphagia  -RD      Functional Impact  risk of aspiration/pneumonia;risk of malnutrition;risk of dehydration  -RD      Rehab Potential/Prognosis, Swallowing  re-evaluate goals as necessary  -RD      Swallow Criteria for Skilled Therapeutic Interventions Met  no significant expected improvement in functional status  -RD      Plan for Continued Treatment (SLP)  Saw for dysphagia treatment for f/u on comfort diet. Pt now comfort measures & on comfort diet. Pt previously was on nectar-thick & pureed prior to admission. Given severity of s/s of discomfort & aspiration w/ thins, may be best to continue previous diet. No immediate s/s of discomfort w/ nectar-thick via tsp/cup or pureed/pudding trials. Pt unable to pull liquid via straw. Continue comfort diet w/ assist & no straws. No further SLP dysphagia needs at this time. Re-consult if any changes.  -RD         Recommendations    Therapy Frequency (Swallow)  evaluation only  -RD      Predicted Duration Therapy Intervention (Days)  --      SLP Diet Recommendation  puree;nectar thick liquids;other (see comments) COMFORT DIET  -RD      Recommended Precautions and Strategies  upright posture during/after eating;small bites of food and sips of liquid;no straw;general aspiration precautions;1:1 supervision;assist with feeding  -RD      Oral Care Recommendations  Oral Care BID/PRN;Suction toothbrush  -RD      SLP Rec. for Method of Medication Administration  meds crushed;with pudding or applesauce;as tolerated;meds via alternate route  -RD      Monitor for Signs of Aspiration  yes;notify SLP if any concerns  -RD      Anticipated Discharge Disposition (SLP)  unknown  -RD        User Key  (r) = Recorded By, (t) = Taken By, (c) = Cosigned By    Initials Name Effective Dates    RD Ariana Smalls, MS  CCC-SLP 06/16/21 -     CH Radha Kapoor, MS CCC-SLP 06/16/21 -           EDUCATION  The patient has been educated in the following areas:   Dysphagia (Swallowing Impairment) Oral Care/Hydration Modified Diet Instruction comfort diet.       SLP GOALS     Row Name 10/06/21 1430 10/05/21 1300 10/04/21 1300       Oral Nutrition/Hydration Goal 1 (SLP)    Oral Nutrition/Hydration Goal 1, SLP  LTG: Pt will return to PO diet w/o s/s of aspiration w/ 100% accuracy w/o cues  -RD  LTG: Pt will return to PO diet w/o s/s of aspiration w/ 100% accuracy w/o cues  -CH  LTG: Pt will return to PO diet w/o s/s of aspiration w/ 100% accuracy w/o cues  -CH    Time Frame (Oral Nutrition/Hydration Goal 1, SLP)  by discharge  -RD  by discharge  -CH  by discharge  -CH    Progress/Outcomes (Oral Nutrition/Hydration Goal 1, SLP)  goal no longer appropriate  -RD  progress slower than expected  -CH  progress slower than expected  -CH       Oral Nutrition/Hydration Goal 2 (SLP)    Oral Nutrition/Hydration Goal 2, SLP  Pt will tolerate therapeutic H2O trials w/o s/s of aspiration w/ 60% accuracy to indicate readiness for repeat instrumental eval.  -RD  Pt will tolerate therapeutic H2O trials w/o s/s of aspiration w/ 60% accuracy to indicate readiness for repeat instrumental eval.  -CH  Pt will tolerate therapeutic H2O trials w/o s/s of aspiration w/ 60% accuracy to indicate readiness for repeat instrumental eval.  -CH    Time Frame (Oral Nutrition/Hydration Goal 2, SLP)  short term goal (STG)  -RD  short term goal (STG)  -CH  short term goal (STG)  -CH    Barriers (Oral Nutrition/Hydration Goal 2, SLP)  pt now on comfort diet & comfort measures. Pt appears most comfortable w/ nectar-thick via tsp/cup & pureed. this was pt's diet prior to admission. NG now removed. No further needs  -RD  delayed cough following trials of ice chips and immediate cough following thin H2O via teaspoon. Multiple swallows with pureed trials.  -CH  Continued overt  s/s of aspiration w/ therapeutic ice, thin and nectar thick liquid trials c/b wet vocal quality & coughing w/ suctioning. Not ready for repeat instrumental eval at this time  -CH    Progress/Outcomes (Oral Nutrition/Hydration Goal 2, SLP)  goal no longer appropriate  -RD  progress slower than expected  -CH  progress slower than expected  -CH      User Key  (r) = Recorded By, (t) = Taken By, (c) = Cosigned By    Initials Name Provider Type    Ariana Perkins MS CCC-SLP Speech and Language Pathologist    Radha Savage MS CCC-SLP Speech and Language Pathologist             Time Calculation:   Time Calculation- SLP     Row Name 10/06/21 1531             Time Calculation- SLP    SLP Start Time  1430  -RD      SLP Received On  10/06/21  -RD         Untimed Charges    65497-DI Treatment Swallow Minutes  38  -RD         Total Minutes    Untimed Charges Total Minutes  38  -RD       Total Minutes  38  -RD        User Key  (r) = Recorded By, (t) = Taken By, (c) = Cosigned By    Initials Name Provider Type    Ariana Perkins MS CCC-SLP Speech and Language Pathologist          Therapy Charges for Today     Code Description Service Date Service Provider Modifiers Qty    17177724919 HC ST TREATMENT SWALLOW 3 10/6/2021 Ariana Smalls MS CCC-SLP GN 1      Patient was not wearing a face mask and did not exhibit coughing during this therapy encounter.  Procedure performed was aerosolizing, involved close contact (within 6 feet for at least 15 minutes or longer), and did not involve contact with infectious secretions or specimens.  Therapist used appropriate personal protective equipment including gloves, standard procedure mask and eye protection.  Appropriate PPE was worn during the entire therapy session.  Hand hygiene was completed before and after therapy session.          MS MINAL Dutton  10/6/2021

## 2021-10-07 LAB
GLUCOSE BLDC GLUCOMTR-MCNC: 100 MG/DL (ref 70–130)
GLUCOSE BLDC GLUCOMTR-MCNC: 108 MG/DL (ref 70–130)
GLUCOSE BLDC GLUCOMTR-MCNC: 109 MG/DL (ref 70–130)
GLUCOSE BLDC GLUCOMTR-MCNC: 97 MG/DL (ref 70–130)

## 2021-10-07 PROCEDURE — 82962 GLUCOSE BLOOD TEST: CPT

## 2021-10-07 PROCEDURE — 99232 SBSQ HOSP IP/OBS MODERATE 35: CPT | Performed by: NURSE PRACTITIONER

## 2021-10-07 PROCEDURE — 25010000002 HEPARIN (PORCINE) PER 1000 UNITS: Performed by: NURSE PRACTITIONER

## 2021-10-07 RX ADMIN — LEVETIRACETAM 2000 MG: 100 SOLUTION ORAL at 21:14

## 2021-10-07 RX ADMIN — LEVETIRACETAM 2000 MG: 100 SOLUTION ORAL at 09:11

## 2021-10-07 RX ADMIN — HEPARIN SODIUM 5000 UNITS: 5000 INJECTION, SOLUTION INTRAVENOUS; SUBCUTANEOUS at 21:15

## 2021-10-07 RX ADMIN — SODIUM CHLORIDE, PRESERVATIVE FREE 10 ML: 5 INJECTION INTRAVENOUS at 21:14

## 2021-10-07 RX ADMIN — PHENYTOIN 125 MG: 125 SUSPENSION ORAL at 21:15

## 2021-10-07 RX ADMIN — DOCUSATE SODIUM 50 MG AND SENNOSIDES 8.6 MG 2 TABLET: 8.6; 5 TABLET, FILM COATED ORAL at 09:11

## 2021-10-07 RX ADMIN — DOCUSATE SODIUM 50 MG AND SENNOSIDES 8.6 MG 2 TABLET: 8.6; 5 TABLET, FILM COATED ORAL at 21:15

## 2021-10-07 RX ADMIN — MINERAL OIL: 1000 LIQUID ORAL at 12:24

## 2021-10-07 RX ADMIN — HEPARIN SODIUM 5000 UNITS: 5000 INJECTION, SOLUTION INTRAVENOUS; SUBCUTANEOUS at 06:18

## 2021-10-07 RX ADMIN — ASPIRIN 81 MG CHEWABLE TABLET 81 MG: 81 TABLET CHEWABLE at 09:11

## 2021-10-07 RX ADMIN — HEPARIN SODIUM 5000 UNITS: 5000 INJECTION, SOLUTION INTRAVENOUS; SUBCUTANEOUS at 12:24

## 2021-10-07 RX ADMIN — MINERAL OIL: 1000 LIQUID ORAL at 21:15

## 2021-10-07 RX ADMIN — MINERAL OIL: 1000 LIQUID ORAL at 09:18

## 2021-10-07 RX ADMIN — PHENYTOIN 125 MG: 125 SUSPENSION ORAL at 09:11

## 2021-10-07 RX ADMIN — SODIUM CHLORIDE, PRESERVATIVE FREE 10 ML: 5 INJECTION INTRAVENOUS at 09:11

## 2021-10-07 RX ADMIN — ATORVASTATIN CALCIUM 10 MG: 10 TABLET, FILM COATED ORAL at 21:15

## 2021-10-07 RX ADMIN — PHENYTOIN 125 MG: 125 SUSPENSION ORAL at 17:10

## 2021-10-07 RX ADMIN — LEVOTHYROXINE SODIUM 25 MCG: 25 TABLET ORAL at 06:18

## 2021-10-07 NOTE — PROGRESS NOTES
[facility] Medicine Services  PROGRESS NOTE    Patient Name: Rao Alcazar  : 1945  MRN: [id number]    Date of Admission: 2021  Primary Care Physician: Provider, No Known    Subjective   Subjective     CC: f/u dysphagia    HPI:   No issues overnight  Continually asking when he can go home    ROS:  UTO due to disability     Objective   Objective     Vital Signs:   Temp:  [97.1 °F (36.2 °C)-97.9 °F (36.6 °C)] 97.6 °F (36.4 °C)  Heart Rate:  [62-71] 68  Resp:  [16-18] 16  BP: (111-167)/(47-76) 118/58  Flow (L/min):  [2] 2     Physical Exam:  Constitutional: No acute distress, awake, alert, looking around room  HENT: NCAT, mucous membranes moist  Respiratory: Clear to auscultation bilaterally, respiratory effort normal   Cardiovascular: RRR, no murmurs, rubs, or gallops  Gastrointestinal: Positive bowel sounds, soft, nontender, nondistended  Musculoskeletal: No bilateral ankle edema  Psychiatric: Appropriate affect, cooperative  Neurologic: Alert, speech difficult to understand  Skin: No rashes      Results Reviewed:  LAB RESULTS:      Lab 10/04/21  0653   WBC 4.28   HEMOGLOBIN 14.9   HEMATOCRIT 44.1   PLATELETS 227   NEUTROS ABS 2.11   IMMATURE GRANS (ABS) 0.02   LYMPHS ABS 1.28   MONOS ABS 0.50   EOS ABS 0.34   MCV 93.0         Lab 10/04/21  0653 10/02/21  1534   SODIUM 134* 137   POTASSIUM 3.8 3.8   CHLORIDE 99 100   CO2 27.0 32.0*   ANION GAP 8.0 5.0   BUN 4* 3*   CREATININE 0.42* 0.38*   GLUCOSE 101* 115*   CALCIUM 8.8 8.9   MAGNESIUM 1.9  --    PHOSPHORUS 3.0  --          Lab 10/04/21  0653   TOTAL PROTEIN 7.6   ALBUMIN 3.30*   ALT (SGPT) 30   AST (SGOT) 32   BILIRUBIN 0.4   ALK PHOS 134*             Lab 10/04/21  0653   CHOLESTEROL 177   TRIGLYCERIDES 136             Brief Urine Lab Results  (Last result in the past 365 days)      Color   Clarity   Blood   Leuk Est   Nitrite   Protein   CREAT   Urine HCG        21 1203 Yellow Clear Negative Negative Negative Negative                Microbiology Results Abnormal     Procedure Component Value - Date/Time    Blood Culture - Blood, Arm, Right [[id number]] Collected: 09/24/21 1150    Lab Status: Final result Specimen: Blood from Arm, Right Updated: 09/29/21 1231     Blood Culture No growth at 5 days    Blood Culture - Blood, Arm, Left [[id number]] Collected: 09/24/21 1150    Lab Status: Final result Specimen: Blood from Arm, Left Updated: 09/29/21 1231     Blood Culture No growth at 5 days    COVID PRE-OP / PRE-PROCEDURE SCREENING ORDER (NO ISOLATION) - Swab, Nasopharynx [[id number]]  (Normal) Collected: 09/24/21 1208    Lab Status: Final result Specimen: Swab from Nasopharynx Updated: 09/24/21 1236    Narrative:      The following orders were created for panel order COVID PRE-OP / PRE-PROCEDURE SCREENING ORDER (NO ISOLATION) - Swab, Nasopharynx.  Procedure                               Abnormality         Status                     ---------                               -----------         ------                     COVID-19 and FLU A/B PCR...[[id number]]  Normal              Final result                 Please view results for these tests on the individual orders.    COVID-19 and FLU A/B PCR - Swab, Nasopharynx [[id number]]  (Normal) Collected: 09/24/21 1208    Lab Status: Final result Specimen: Swab from Nasopharynx Updated: 09/24/21 1236     COVID19 Not Detected     Influenza A PCR Not Detected     Influenza B PCR Not Detected    Narrative:      Fact sheet for providers: https://www.fda.gov/media/378752/download    Fact sheet for patients: https://www.fda.gov/media/631579/download    Test performed by PCR.        CXR personally reviewed showing bilateral opacities. Agree with interpretation.    Results for orders placed during the hospital encounter of 09/24/21    Adult Transthoracic Echo Complete W/ Cont if Necessary Per Protocol    Interpretation Summary  · Normal left ventricular systolic function, estimated EF 60%.  · Aortic  sclerosis without aortic stenosis.  · Trace aortic insufficiency.  · Trace mitral regurgitation, trace tricuspid regurgitation, normal RVSP.      I have reviewed the medications:  Scheduled Meds:aspirin, 81 mg, Oral, Daily   Or  aspirin, 300 mg, Rectal, Daily  atorvastatin, 10 mg, Oral, Nightly  heparin (porcine), 5,000 Units, Subcutaneous, Q8H  levETIRAcetam, 2,000 mg, Oral, Q12H  levothyroxine, 25 mcg, Oral, Q AM  palliative care oral rinse, , Mouth/Throat, TID - RT  phenytoin, 125 mg, Oral, TID  senna-docusate sodium, 2 tablet, Oral, BID  sodium chloride, 10 mL, Intravenous, Q12H      Continuous Infusions:dextrose 5 % with KCl 20 mEq, 75 mL/hr, Last Rate: 75 mL/hr (10/06/21 1001)      PRN Meds:.•  acetaminophen  •  senna-docusate sodium **AND** polyethylene glycol **AND** bisacodyl **AND** bisacodyl  •  sodium chloride  •  sodium chloride    Assessment/Plan   Assessment & Plan     Active Hospital Problems    Diagnosis  POA   • Pneumonia of both lower lobes due to infectious organism [J18.9]  Yes   • Chronic diastolic CHF (congestive heart failure) (HCC) [I50.32]  Unknown   • Hyperlipidemia [E78.5]  Yes   • Mental disorder [F99]  Yes   • History of seizure disorder [Z86.69]  Not Applicable   • Cardiomegaly [I51.7]  Yes      Resolved Hospital Problems   No resolved problems to display.        Brief Hospital Course to date:  Rao Alcazar is a 76 y.o. male patient with severe intellectual disability, nonverbal at baseline (guardian of state), with history of dyslipidemia, seizure disorder and chronic hypoxic respiratory failure on home oxygen at 2 L/min presented to the hospital with fever and cough and was found to have bilateral basal pneumonia. This is my first day assessing patient's active medical issues.     This patient's problems and plans were partially entered by my partner and updated as appropriate by me 10/07/21.    Assessment/plan:    GOC  -My partner prior had d/w patient's family. This patient  "suffers from chronic conditions such as seizure disorder, cognitive delay and heart failure. DNR/DNI is appropriate as this would only prolong suffering and would most likely not be successful -guardian in agreement - changed to DNR/DNI.  -Most currently this patient has been diagnosed with severe dysphagia. He has been receiving nutrition via an NG tube which is temporary but he has pulled at this tube and continues trying to push it out with his mouth. His sitter, whom knows him well, states that he \"lives to eat\". A comfort diet along with comfort measures would be most appropriate as a PEG placement and any further aggressive treatment would only prolong suffering. He is progressive in his chronic conditions with no improvement foreseen.      Aspiration pneumonia  Dysphagia   -CT chest showed bilateral pulmonary infiltrates and pleural effusion, concerning for community acquired pneumonia  -Has completed course of IV abx.  -Continue comfort diet.  -CM/SW following for disposition, looking for LTC facility     Seizure disorder  -Keppra and Dilantin  -Seizure precautions, stable     Elevated alk phos, trending down    Diastolic heart failure, not in decompensation  -echo EF 60%, aortic stenosis, normal RVSP   -Compensated. Continue aspirin     Hyperlipidemia  -Continue statin     Severe intellectual disability   Nonverbal   --Stable to baseline    DVT prophylaxis:  Medical DVT prophylaxis orders are present.       AM-PAC 6 Clicks Score (PT): 6 (10/05/21 2030)    Disposition: I expect the patient to be discharged TBD.  Pending CM finding a facility for long term care and hospice    CODE STATUS:   Code Status and Medical Interventions:   Ordered at: 10/05/21 1420     Code Status:    No CPR     Medical Interventions (Level of Support Prior to Arrest):    Comfort Measures       Mayra Goodson, MONIKA  10/07/21            "

## 2021-10-07 NOTE — CASE MANAGEMENT/SOCIAL WORK
Continued Stay Note  [facility]     Patient Name: Rao Alcazar  MRN: [id number]  Today's Date: 10/7/2021    Admit Date: 9/24/2021    Discharge Plan     Row Name 10/07/21 1535       Plan    Plan Comments  NALINI notified that pt unable to go back to his group home and will need long term with Hospice at a facility. Per guardian, requesting facilities in [place] or [place]. NALINI called and sent referrals to All of the facilities in [place] and [place].    Row Name 10/07/21 1325       Plan    Plan  Long term care    Plan Comments  Telephone call received from Vandana, the [place] [facility], [place] has been involved with pt's care. Vandana stated pt is no longer to care for pt in the group home, as pt's medical needs exceed what the home is able to provide. Griselda Carrillo of above information. Will continue to follow. Please call [phone] if can be of further assistance.        Discharge Codes    No documentation.       Expected Discharge Date and Time     Expected Discharge Date Expected Discharge Time    Oct 1, 2021             CHELSEY Jurado

## 2021-10-07 NOTE — PLAN OF CARE
Goal Outcome Evaluation:  Plan of Care Reviewed With: guardian  Progress: no change   Pt awake majority of shift. Confused, asking for pudding. Pt ate 100% of all meals with assistance today. NSR on monitor. Pt maintained SpO2 above 94% on RA. Skin interventions in place. Pt turned Q 2. VSS. No further needs noted at this time

## 2021-10-07 NOTE — DISCHARGE PLACEMENT REQUEST
"Rao Reyes (76 y.o. Male)      to contact: Grecia Lebron [phone]  Has State Guardian Frantz Rao [phone]. Patient needing long term placement with Hospice.    Date of Birth Social Security Number Address Home Phone MRN    1945  [address]  [facility] [address] [phone] [id number]    Bapt[employer] Marital Status          Non-Rastafari Single       Admission Date Admission Type Admitting Provider Attending Provider Department, Room/Bed    9/24/21 Emergency Elida Red II, Elida Kellogg II,  [address], S323/1    Discharge Date Discharge Disposition Discharge Destination                       Attending Provider: Elida Red II, DO    Allergies: No Known Allergies    Isolation: None   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)   Code Status: No CPR    Ht: 180 cm (70.87\")   Wt: 83.9 kg (185 lb)    Admission Cmt: None   Principal Problem: None                Active Insurance as of 9/24/2021     Primary Coverage     Payor Plan Insurance Group Employer/Plan Group    MEDICARE MEDICARE A & B      Payor Plan Address Payor Plan Phone Number Payor Plan Fax Number Effective Dates    [address] [phone]  6/1/2011 - None Entered    [facility] [address]       Subscriber Name Subscriber Birth Date Member ID       RAO REYES 1945 [id number]           Secondary Coverage     Payor Plan Insurance Group Employer/Plan Group    [place]      Payor Plan Address Payor Plan Phone Number Payor Plan Fax Number Effective Dates    [address] [phone]  9/28/2017 - None Entered    [place] [address]       Subscriber Name Subscriber Birth Date Member ID       RAO REYES 1945 [id number]                 Emergency Contacts      (Rel.) Home Phone Work Phone Mobile Phone    FRANTZ RAO (Guardian) -- [phone]               History & Physical      Johnathan Sanchez MD at 09/24/21 [address] [place] [address]" [facility]  HISTORY AND PHYSICAL    Patient Name: Rao Alcazar  : 1945  MRN: [id number]  Primary Care Physician: Provider, No Known  Date of admission: 2021    Subjective   Subjective     Chief Complaint:  Cough and fever    HPI:  Rao Alcazar is a 76 y.o. 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  Patient is not able to provide any history, history is obtained from caregiver at bedside.  Caregiver indicates that the patient began having more difficulty breathing with productive cough and fever overnight.  T-max of 101.5.  Patient additionally noted to be more weak than normal.  Patient has had several aspiration pneumonias in the past and the caregiver was concerned that this is a possibility again so chose to bring him to the ER.  Labs with elevated WBC 18, pro-Octavio 0.41.  Chest x-ray showed enlarged heart with increased pulmonary vascularity bilaterally but no acute parenchymal disease.  CT chest shows patchy groundglass opacity in the perihilar regions bilaterally concerning for infiltrate.  Patient will be admitted to hospital medicine for further evaluation and treatment.    COVID Details:        Symptoms: [] NONE [x] Fever [x]  Cough [] Shortness of breath [] Change in taste or smell  The patient has a COVID HM Topic on their chart, and they are fully vaccinated.    Review of Systems   Unable to obtain due to nonverbal  All other systems reviewed and are negative.     Personal History     Past Medical History:   Diagnosis Date   • Atypical chest pain    • Cardiomegaly    • CHF (congestive heart failure) (CMS/HCC)    • History of panic attacks    • History of seizure disorder    • Mental disorder    • Mental retardation    • Panic attacks    • Seizures  (CMS/Formerly [facility])        Past Surgical History:   Procedure Laterality Date   • EXPLORATORY LAPAROTOMY         Family History:  Family history is unknown by patient. Otherwise pertinent FHx was reviewed and unremarkable.     Social History:  reports that he has never smoked. He has never used smokeless tobacco. He reports that he does not drink alcohol and does not use drugs.  Social History     Social History Narrative   • Not on file       Medications:  Calcium Carb-Cholecalciferol, Menthol-Zinc Oxide, Polyethylene Glycol 3350, acetaminophen, aspirin, clonazePAM, hydrophor, levETIRAcetam, levothyroxine, loperamide, mupirocin, phenytoin extended, raNITIdine, sennosides-docusate, simvastatin, and tamsulosin    No Known Allergies    Objective   Objective     Vital Signs:   Temp:  [99.3 °F (37.4 °C)] 99.3 °F (37.4 °C)  Heart Rate:  [68-89] 68  Resp:  [16-22] 18  BP: ()/(53-91) 102/53  Flow (L/min):  [2] 2    Physical Exam   Constitutional: lethargic, chronically ill appearing  Eyes: PERRLA, sclerae anicteric, no conjunctival injection  HENT: NCAT, mucous membranes DRY   Neck: Supple, no thyromegaly, no lymphadenopathy, trachea midline  Respiratory: Bilateral rhonchi with decreased bases, nonlabored respirations on 2lnc   Cardiovascular: RRR, no murmurs, rubs, or gallops, palpable pedal pulses bilaterally  Gastrointestinal: Positive bowel sounds, soft, nontender, nondistended  Musculoskeletal: No bilateral ankle edema, no clubbing or cyanosis to extremities  Psychiatric: ADDIE  Neurologic: CRABTREE spontaneously, doesn't follow commands, nonverbal   Skin: No rashes    Result Review:  I have personally reviewed the results from the time of this admission to 09/24/21 5:00 PM EDT and agree with these findings:  [x]  Laboratory  [x]  Microbiology  [x]  Radiology  [x]  EKG/Telemetry   []  Cardiology/Vascular   []  Pathology  [x]  Old records  []  Other:  Most notable findings include:   Non verbal, very dehydrated looking    LAB  RESULTS:      Lab 09/24/21  1249 09/24/21  1150   WBC  --  18.24*   HEMOGLOBIN  --  15.6   HEMATOCRIT  --  47.2   PLATELETS  --  260   NEUTROS ABS  --  15.15*   IMMATURE GRANS (ABS)  --  0.08*   LYMPHS ABS  --  1.88   MONOS ABS  --  1.09*   EOS ABS  --  0.01   MCV  --  95.4   PROCALCITONIN 0.41*  --    LACTATE  --  1.9         Lab 09/24/21  1249   SODIUM 138   POTASSIUM 4.6   CHLORIDE 99   CO2 29.0   ANION GAP 10.0   BUN 16   CREATININE 0.93   GLUCOSE 162*   CALCIUM 9.0   MAGNESIUM 1.9         Lab 09/24/21  1249   TOTAL PROTEIN 8.0   ALBUMIN 3.70   GLOBULIN 4.3   ALT (SGPT) 7   AST (SGOT) 10   BILIRUBIN 0.3   ALK PHOS 181*         Lab 09/24/21  1249   PROBNP 197.8   TROPONIN T <0.010                 UA    Urinalysis 9/24/21   Specific [place], UA 1.020   Ketones, UA Negative   Blood, UA Negative   Leukocytes, UA Negative   Nitrite, UA Negative           Microbiology Results (last 10 days)     Procedure Component Value - Date/Time    COVID PRE-OP / PRE-PROCEDURE SCREENING ORDER (NO ISOLATION) - Swab, Nasopharynx [[id number]]  (Normal) Collected: 09/24/21 1208    Lab Status: Final result Specimen: Swab from Nasopharynx Updated: 09/24/21 1236    Narrative:      The following orders were created for panel order COVID PRE-OP / PRE-PROCEDURE SCREENING ORDER (NO ISOLATION) - Swab, Nasopharynx.  Procedure                               Abnormality         Status                     ---------                               -----------         ------                     COVID-19 and FLU A/B PCR...[[id number]]  Normal              Final result                 Please view results for these tests on the individual orders.    COVID-19 and FLU A/B PCR - Swab, Nasopharynx [[id number]]  (Normal) Collected: 09/24/21 1208    Lab Status: Final result Specimen: Swab from Nasopharynx Updated: 09/24/21 1236     COVID19 Not Detected     Influenza A PCR Not Detected     Influenza B PCR Not Detected    Narrative:      Fact sheet for providers:  https://www.fda.gov/media/079623/download    Fact sheet for patients: https://www.fda.gov/media/722937/download    Test performed by PCR.          CT Abdomen Pelvis Without Contrast    Result Date: 9/24/2021  EXAMINATION: CT CHEST WO CONTRAST, DIAGNOSTIC-, CT ABDOMEN AND PELVIS WO CONTRAST-09/24/2021:  INDICATION: Sepsis, fever, chest pain.  TECHNIQUE: Multiple axial CT imaging was obtained of the chest, abdomen and pelvis without the administration of intravenous contrast.  The radiation dose reduction device was turned on for each scan per the ALARA (As Low as Reasonably Achievable) protocol.  COMPARISON: NONE.  FINDINGS:  CHEST:  The thyroid is homogeneous. No mediastinal mass or adenopathy. The cardiac chambers are within normal limits.  No pericardial effusion. There is patchy groundglass opacity seen in the perihilar regions bilaterally concerning for infiltrate. There is a tiny left pleural effusion. Degenerative changes seen within the spine.  ABDOMEN: The liver is heterogeneous in appearance. There is calcification seen within the spleen. Old healed granulomatous disease as well seen within the liver. The pancreas is homogeneous. The kidneys and adrenal glands are within normal limits. No abdominal or retroperitoneal lymphadenopathy. No free fluid or free air. There is stool seen scattered diffusely throughout the colon.  PELVIS: The pelvic organs are unremarkable. The pelvic portions of the gastrointestinal tract are within normal limits. No free fluid or free air. No abnormal mass or fluid collection is identified. Degenerative changes seen within the spine and pelvis.      Impression: Increased perihilar markings bilaterally suggesting bilateral infiltrate with small left pleural effusion. The cardiac chambers are enlarged with increased stool seen scattered diffusely throughout the colon. No evidence of obvious obstruction or gross free intraperitoneal air.  D:  09/24/2021 E:  09/24/2021        CT Chest  Without Contrast Diagnostic    Result Date: 9/24/2021  EXAMINATION: CT CHEST WO CONTRAST, DIAGNOSTIC-, CT ABDOMEN AND PELVIS WO CONTRAST-09/24/2021:  INDICATION: Sepsis, fever, chest pain.  TECHNIQUE: Multiple axial CT imaging was obtained of the chest, abdomen and pelvis without the administration of intravenous contrast.  The radiation dose reduction device was turned on for each scan per the ALARA (As Low as Reasonably Achievable) protocol.  COMPARISON: NONE.  FINDINGS:  CHEST:  The thyroid is homogeneous. No mediastinal mass or adenopathy. The cardiac chambers are within normal limits.  No pericardial effusion. There is patchy groundglass opacity seen in the perihilar regions bilaterally concerning for infiltrate. There is a tiny left pleural effusion. Degenerative changes seen within the spine.  ABDOMEN: The liver is heterogeneous in appearance. There is calcification seen within the spleen. Old healed granulomatous disease as well seen within the liver. The pancreas is homogeneous. The kidneys and adrenal glands are within normal limits. No abdominal or retroperitoneal lymphadenopathy. No free fluid or free air. There is stool seen scattered diffusely throughout the colon.  PELVIS: The pelvic organs are unremarkable. The pelvic portions of the gastrointestinal tract are within normal limits. No free fluid or free air. No abnormal mass or fluid collection is identified. Degenerative changes seen within the spine and pelvis.      Impression: Increased perihilar markings bilaterally suggesting bilateral infiltrate with small left pleural effusion. The cardiac chambers are enlarged with increased stool seen scattered diffusely throughout the colon. No evidence of obvious obstruction or gross free intraperitoneal air.  D:  09/24/2021 E:  09/24/2021        XR Chest 1 View    Result Date: 9/24/2021  EXAMINATION: XR CHEST 1 VW-09/24/2021:  INDICATION: Weak/Dizzy/AMS, triage protocol.  COMPARISON: NONE.  FINDINGS:  Portable chest reveals the heart to be borderline enlarged. Increased pulmonary vascularity bilaterally. No definite pleural effusion or pneumothorax. Degenerative changes seen within the spine. Increased pulmonary vascularity bilaterally.      Impression: The heart is enlarged with increased pulmonary vascularity bilaterally. No evidence of acute parenchymal disease.  D:  09/24/2021 E:  09/24/2021          Results for orders placed in visit on 07/28/16    SCANNED - ECHOCARDIOGRAM      Assessment/Plan   Assessment & Plan       Cardiomegaly    Mental disorder    History of seizure disorder    Hyperlipidemia    Pneumonia of both lower lobes due to infectious organism    Chronic diastolic CHF (congestive heart failure) (HCC)      Aspiration pneumonia  --previously treated for aspiration pneumonia several times per care giver  --currently on modified diet   --CT chest with bilateral infiltrates and tiny left pleural effusion  --Started on IV Zosyn and vancomycin in ED  --MRSA PCR pending, if negative will stop vancomycin  --SLP consultation   --NPO tonight     Seizure disorder  --Continue Keppra and Dilantin  --last seizure was one month ago  --follows with Dr Fulton     Diastolic heart failure  Cardiomegaly  --ASA    Hyperlipidemia  --Continue statin    DVT prophylaxis:  Heparin     CODE STATUS:    Level Of Support Discussed With: Health Care Surrogate  Code Status: CPR  Medical Interventions (Level of Support Prior to Arrest): Full      This note has been completed as part of a split-shared workflow.   Signature: Electronically signed by MONIKA Zaragoza, 09/24/21, 5:29 PM EDT.    Attending   Admission Attestation       I have seen and examined the patient, performing an independent face-to-face diagnostic evaluation with plan of care reviewed and developed with the advanced practice clinician (APC).      Brief Summary Statement:   Rao Alcazar is a 76 y.o. 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    He is non verbal and unable to provide history.  His caregiver is in the ER and can provide some but limited history.  He looks dehydrated and chronically ill and is tachycardic in the ER on my visit.   He breathes with his mouth wide open.    Remainder of detailed HPI is as noted by APC and has been reviewed and/or edited by me for completeness.    Attending Physical Exam:    Constitutional: sleeping, chronically ill appearing  Eyes: PERRLA, sclerae anicteric, no conjunctival injection  HENT: NCAT, dry tongue  Neck: Supple, no JVD, trachea midline  Respiratory: Clear to auscultation bilaterally, nonlabored respirations   Cardiovascular: tachy, s1 and s2  Gastrointestinal: Positive bowel sounds, soft, nontender, nondistended  Musculoskeletal: No bilateral ankle edema, no clubbing or cyanosis to extremities  Psychiatric: flat affect, not communicating  Skin: dry, + skin tenting    Brief Assessment/Plan :  See detailed assessment and plan developed with APC which I have reviewed and/or edited for completeness.      Admission Status: I believe that this patient meets INPATIENT status due to fever of unclear etiology and probable aspiration.  I feel patient’s risk for adverse outcomes and need for care warrant INPATIENT evaluation and I predict the patient’s care encounter to likely last beyond 2 midnights.        Johnathan Sanchez MD  21                          Electronically signed by Johnathan Sanchez MD at 21 2211            Physician Progress Notes (last 48 hours) (Notes from 10/05/21 1513 through 10/07/21 1513)      Fidelia Mcintyre APRN at 10/06/21 0925              [facility] Medicine [facility]  PROGRESS NOTE    Patient Name: Rao Alcazar  : 1945  MRN: [id number]    Date of Admission: 2021  Primary Care Physician: Provider, No  Known    Subjective   Subjective     CC: f/u dysphagia    HPI:   Patient seen resting up in bed awake alert.  Minimally interactive.  Mumbles but unable to understand any words.  Tolerating comfort diet with feed assist per staff.  No new issues.    ROS:  UTO due to AMS.     Objective   Objective     Vital Signs:   Temp:  [96 °F (35.6 °C)-98.2 °F (36.8 °C)] 97.1 °F (36.2 °C)  Heart Rate:  [61-72] 61  Resp:  [16-18] 16  BP: ()/(57-91) 113/59  Flow (L/min):  [2-4] 2     Physical Exam:  Constitutional: No acute distress, awake, alert.  Resting up in bed.  Chronically ill and frail appearing  HENT: NCAT, mucous membranes moist  Respiratory: Clear to auscultation bilaterally but slightly decreased at bases, respiratory effort normal   Cardiovascular: RRR, no murmurs, rubs, or gallops  Gastrointestinal: Positive bowel sounds, soft, nontender, nondistended  Musculoskeletal: No bilateral ankle edema.  Franco spontaneously.  Psychiatric: ADDIE  Neurologic: ADDIE  Skin: No rashes    Results Reviewed:  LAB RESULTS:      Lab 10/04/21  0653 09/30/21  1205   WBC 4.28 3.85   HEMOGLOBIN 14.9 14.4   HEMATOCRIT 44.1 42.5   PLATELETS 227 187   NEUTROS ABS 2.11 1.79   IMMATURE GRANS (ABS) 0.02 0.01   LYMPHS ABS 1.28 1.24   MONOS ABS 0.50 0.53   EOS ABS 0.34 0.26   MCV 93.0 93.2         Lab 10/04/21  0653 10/02/21  1534 09/30/21  1205   SODIUM 134* 137 138   POTASSIUM 3.8 3.8 3.7   CHLORIDE 99 100 97*   CO2 27.0 32.0* 31.0*   ANION GAP 8.0 5.0 10.0   BUN 4* 3* 4*   CREATININE 0.42* 0.38* 0.37*   GLUCOSE 101* 115* 126*   CALCIUM 8.8 8.9 8.8   MAGNESIUM 1.9  --  1.8   PHOSPHORUS 3.0  --   --          Lab 10/04/21  0653   TOTAL PROTEIN 7.6   ALBUMIN 3.30*   ALT (SGPT) 30   AST (SGOT) 32   BILIRUBIN 0.4   ALK PHOS 134*             Lab 10/04/21  0653   CHOLESTEROL 177   TRIGLYCERIDES 136             Brief Urine Lab Results  (Last result in the past 365 days)      Color   Clarity   Blood   Leuk Est   Nitrite   Protein   CREAT   Urine HCG         09/24/21 1203 Yellow Clear Negative Negative Negative Negative               Microbiology Results Abnormal     Procedure Component Value - Date/Time    Blood Culture - Blood, Arm, Right [[id number]] Collected: 09/24/21 1150    Lab Status: Final result Specimen: Blood from Arm, Right Updated: 09/29/21 1231     Blood Culture No growth at 5 days    Blood Culture - Blood, Arm, Left [[id number]] Collected: 09/24/21 1150    Lab Status: Final result Specimen: Blood from Arm, Left Updated: 09/29/21 1231     Blood Culture No growth at 5 days    COVID PRE-OP / PRE-PROCEDURE SCREENING ORDER (NO ISOLATION) - Swab, Nasopharynx [[id number]]  (Normal) Collected: 09/24/21 1208    Lab Status: Final result Specimen: Swab from Nasopharynx Updated: 09/24/21 1236    Narrative:      The following orders were created for panel order COVID PRE-OP / PRE-PROCEDURE SCREENING ORDER (NO ISOLATION) - Swab, Nasopharynx.  Procedure                               Abnormality         Status                     ---------                               -----------         ------                     COVID-19 and FLU A/B PCR...[[id number]]  Normal              Final result                 Please view results for these tests on the individual orders.    COVID-19 and FLU A/B PCR - Swab, Nasopharynx [[id number]]  (Normal) Collected: 09/24/21 1208    Lab Status: Final result Specimen: Swab from Nasopharynx Updated: 09/24/21 1236     COVID19 Not Detected     Influenza A PCR Not Detected     Influenza B PCR Not Detected    Narrative:      Fact sheet for providers: https://www.fda.gov/media/806527/download    Fact sheet for patients: https://www.fda.gov/media/710367/download    Test performed by PCR.        CXR personally reviewed showing bilateral opacities. Agree with interpretation.    Results for orders placed during the hospital encounter of 09/24/21    Adult Transthoracic Echo Complete W/ Cont if Necessary Per Protocol    Interpretation Summary  · Normal  left ventricular systolic function, estimated EF 60%.  · Aortic sclerosis without aortic stenosis.  · Trace aortic insufficiency.  · Trace mitral regurgitation, trace tricuspid regurgitation, normal RVSP.      I have reviewed the medications:  Scheduled Meds:[START ON 10/7/2021] aspirin, 81 mg, Oral, Daily   Or  [START ON 10/7/2021] aspirin, 300 mg, Rectal, Daily  atorvastatin, 10 mg, Oral, Nightly  heparin (porcine), 5,000 Units, Subcutaneous, Q8H  levETIRAcetam, 2,000 mg, Oral, Q12H  [START ON 10/7/2021] levothyroxine, 25 mcg, Oral, Q AM  palliative care oral rinse, , Mouth/Throat, TID - RT  phenytoin, 125 mg, Oral, TID  senna-docusate sodium, 2 tablet, Oral, BID  sodium chloride, 10 mL, Intravenous, Q12H      Continuous Infusions:dextrose 5 % with KCl 20 mEq, 75 mL/hr, Last Rate: 75 mL/hr (10/06/21 1001)      PRN Meds:.•  acetaminophen  •  senna-docusate sodium **AND** polyethylene glycol **AND** bisacodyl **AND** bisacodyl  •  sodium chloride  •  sodium chloride    Assessment/Plan   Assessment & Plan     Active Hospital Problems    Diagnosis  POA   • Pneumonia of both lower lobes due to infectious organism [J18.9]  Yes   • Chronic diastolic CHF (congestive heart failure) (HCC) [I50.32]  Unknown   • Hyperlipidemia [E78.5]  Yes   • Mental disorder [F99]  Yes   • History of seizure disorder [Z86.69]  Not Applicable   • Cardiomegaly [I51.7]  Yes      Resolved Hospital Problems   No resolved problems to display.        Brief Hospital Course to date:  Rao Alcazar is a 76 y.o. male patient with severe intellectual disability, nonverbal at baseline (guardian of state), with history of dyslipidemia, seizure disorder and chronic hypoxic respiratory failure on home oxygen at 2 L/min presented to the hospital with fever and cough and was found to have bilateral basal pneumonia. This is my first day assessing patient's active medical issues.     This patient's problems and plans were partially entered by my partner and  "updated as appropriate by me 10/06/21.    Assessment/plan:    [facility]  --My partner prior had d/w patient's family. This patient suffers from chronic conditions such as seizure disorder, cognitive delay and heart failure. DNR/DNI is appropriate as this would only prolong suffering and would most likely not be successful - guardian in agreement - changed to DNR/DNI.  --Most currently this patient has been diagnosed with severe dysphagia. He has been receiving nutrition via an NG tube which is temporary but he has pulled at this tube and continues trying to push it out with his mouth. His sitter, whom knows him well, states that he \"lives to eat\". A comfort diet along with comfort measures would be most appropriate as a PEG placement and any further aggressive treatment would only prolong suffering. He is progressive in his chronic conditions with no improvement foreseen. Awaiting guardian decision regarding this.     Aspiration pneumonia  Dysphagia   -CT chest showed bilateral pulmonary infiltrates and pleural effusion, concerning for community acquired pneumonia  --Has completed course of IV abx.  --Guarding and is now decided comfort care approach.  Patient started on comfort diet.  --CM/SW following for disposition.     Seizure disorder  -Keppra and Dilantin  -Seizure precautions, stable     Elevated alk phos  --improved     Diastolic heart failure, not in decompensation  -echo EF 60%, aortic stenosis, normal RVSP   -Compensated. Continue aspirin     Hyperlipidemia  -Continue statin     Severe intellectual disability   Nonverbal   --Stable to baseline    DVT prophylaxis:  Medical DVT prophylaxis orders are present.       AM-PAC 6 Clicks Score (PT): 6 (10/05/21 2030)    Disposition: I expect the patient to be discharged TBD.  Pending guardianship decision.  Social work following.    CODE STATUS:   Code Status and Medical Interventions:   Ordered at: 10/05/21 1420     Code Status:    No CPR     Medical Interventions " (Level of Support Prior to Arrest):    Comfort Measures       MONIKA Farmer  10/06/21              Electronically signed by Fidelia Mcintyre APRN at 10/06/21 9145       Consult Notes (last 24 hours) (Notes from 10/06/21 1513 through 10/07/21 1513)    No notes of this type exist for this encounter.            Physical Therapy Notes (most recent note)      Sury Latham, PT at 21 1442  Version 2 of 2         Patient Name: Rao Alcazar  : 1945    MRN: [id number]                              Today's Date: 2021       Admit Date: 2021    Visit Dx:     ICD-10-CM ICD-9-CM   1. Pneumonia of both lower lobes due to infectious organism  J18.9 486   2. Acute on chronic respiratory failure with hypoxia (HCC)  J96.21 518.84     799.02     Patient Active Problem List   Diagnosis   • Atypical chest pain   • Cardiomegaly   • Congestive heart failure (CMS/HCC)   • Shortness of breath   • Cardiac mass   • Chest pain   • Mental disorder   • History of panic attacks   • History of seizure disorder   • Mental retardation   • Hyperlipidemia   • Pneumonia of both lower lobes due to infectious organism   • Chronic diastolic CHF (congestive heart failure) (HCC)     Past Medical History:   Diagnosis Date   • Atypical chest pain    • Cardiomegaly    • CHF (congestive heart failure) (CMS/HCC)    • History of panic attacks    • History of seizure disorder    • Mental disorder    • Mental retardation    • Panic attacks    • Seizures (CMS/HCC)      Past Surgical History:   Procedure Laterality Date   • EXPLORATORY LAPAROTOMY       General Information     Row Name 21 1525          Physical Therapy Time and Intention    Document Type  discharge evaluation/summary  -CD     Mode of Treatment  physical therapy  -CD     Row Name 21 1525          General Information    Patient Profile Reviewed  yes  -CD     Prior Level of Function  dependent:;transfer;bed mobility;ADL's SPOKE WITH  "CAREGIVER, NICKO, WHO REPORTS PT IS MOSTLY NONVERBAL AND DEPENDENT WITH ADLS/MOBILITY WITH USE OF FELICIA LIFT FOR UP TO W/C.  -CD     Existing Precautions/Restrictions  fall ??ASPIRATION PNA, NPO.  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  -CD     Row Name 09/26/21 1525          Cognition    Orientation Status (Cognition)  oriented to;person PT ATTEMPTED TO SAY NAME \"SAM\", OTHERWISE RESPONDED \"YEAH\" TO EVERYTHING.  -CD     Row Name 09/26/21 1525          Safety Issues, Functional Mobility    Safety Issues Affecting Function (Mobility)  ability to follow commands;awareness of need for assistance;insight into deficits/self-awareness;safety precaution awareness;sequencing abilities  -CD     Impairments Affecting Function (Mobility)  balance;cognition;strength;motor control  -CD     Cognitive Impairments, Mobility Safety/Performance  attention;awareness, need for assistance;insight into deficits/self-awareness;problem-solving/reasoning;sequencing abilities  -CD     Comment, Safety Issues/Impairments (Mobility)  ONLY FOLLOWED COMMAND TO LIFT FOOT TO DON SOCKS, STRAIGHTEN LEGS AND RAISE L UE. NOT TRACKING CONSISTENTLY IN ROOM.  -CD       User Key  (r) = Recorded By, (t) = Taken By, (c) = Cosigned By    Initials Name Provider Type    CD Sury Latham, PT Physical Therapist        Mobility     Row Name 09/26/21 1531          Bed Mobility    Bed Mobility  rolling left;rolling right  -CD     Rolling Left [place] (Bed Mobility)  dependent (less than 25% patient effort);2 person assist  -CD     Rolling Right [place] (Bed Mobility)  dependent (less than 25% patient effort);2 person assist  -CD     Assistive Device (Bed Mobility)  draw sheet  -CD     Comment (Bed Mobility)  PT UNABLE TO ASSIST WITH ROLLING DESPITE MAX CUES. ROLLED L/R FOR " PLACEMENT OF MECHANICAL LIFT SLING.  -[facility] Name 09/26/21 1531          Transfers    Comment (Transfers)  LIFT BED TO CHAIR.  -[facility] Name 09/26/21 1531          Bed-Chair Transfer    Bed-Chair [place] (Transfers)  dependent (less than 25% patient effort);2 person assist  -[facility] Name 09/26/21 1531          Gait/Stairs (Locomotion)    Comment (Gait/Stairs)  PT IS NONAMBULATORY AT BASELINE PER CG.  -CD       User Key  (r) = Recorded By, (t) = Taken By, (c) = Cosigned By    Initials Name Provider Type    Sury Alston PT Physical Therapist        Obj/Interventions     [facility] Name 09/26/21 1532          Range of Motion Comprehensive    General Range of Motion  bilateral upper extremity ROM WFL;bilateral lower extremity ROM WFL  -CD     Comment, General Range of Motion  ALL ROM PASSIVE, AS PT NOT FOLLOWING COMMANDS AND NO PURPOSEFUL MOVEMENT EXCEPT LIFTING LE'S TO KATHY SOCKS.  -[facility] Name 09/26/21 1532          Strength Comprehensive (MMT)    General Manual Muscle Testing (MMT) Assessment  lower extremity strength deficits identified;upper extremity strength deficits identified  -CD     Comment, General Manual Muscle Testing (MMT) Assessment  GENERALIZED WEAKNESS. LIFTED L UE AND B LE FROM EOB PARTIAL RANGE.  -[facility] Name 09/26/21 1532          Motor Skills    Therapeutic Exercise  -- PROM B LE'S- 1 SET OF 10 REPS- AP'S,  LAQ, HIP FLEX.  -       User Key  (r) = Recorded By, (t) = Taken By, (c) = Cosigned By    Initials Name Provider Type    Sury Alston PT Physical Therapist        Goals/Plan    No documentation.       Clinical Impression     [facility] Name 09/26/21 1534          Pain    Additional Documentation  Pain Scale: FACES Pre/Post-Treatment (Group)  -[facility] Name 09/26/21 1534          Pain Scale: FACES Pre/Post-Treatment    Pain: FACES Scale, Pretreatment  0-->no hurt  -CD     Posttreatment Pain Rating  0-->no hurt  -CD     Pre/Posttreatment Pain Comment  NAD WITH ROLLING IN  BED, ROM ASSESSMENT.  -CD     Row Name 09/26/21 1534          Plan of Care Review    Plan of Care Reviewed With  patient  -CD     Outcome Summary  GOALS NOT ESTABLISHED AS PT APPEARS AT BASELINE WITH FUNCTIONAL MOBILITY. PER CG PT IS DEPENDENT WITH ADL'S AND UTILIZES FELICIA LIFT BED TO W/C AT HOME. WILL DEFER TO NSG FOR CONTINUED LIFT TO RECLINER WITH MECHANICAL LIFT SYSTEM, ROM OF EXTREMITES DURING BATHING AND POSTIONING FOR COMFORT, PRESSURE RELIEF AND EDEMA REDUCTION. PT IS NOT APPROPRIATE FOR ONGOING SKILLED P.T.  -CD     Row Name 09/26/21 1534          Therapy Assessment/Plan (PT)    Patient/Family Therapy Goals Statement (PT)  UNABLE TO STATE.  -CD     Criteria for Skilled Interventions Met (PT)  no;does not meet criteria for skilled intervention  -CD     Row Name 09/26/21 1534          Vital Signs    Post Systolic BP Rehab  121  -CD     Post Treatment Diastolic BP  49  -CD     Posttreatment Heart Rate (beats/min)  62  -CD     Pre SpO2 (%)  98  -CD     O2 Delivery Pre Treatment  supplemental O2 4L  -CD     O2 Delivery Intra Treatment  supplemental O2  -CD     O2 Delivery Post Treatment  supplemental O2  -CD     Pre Patient Position  Supine  -CD     Intra Patient Position  Supine  -CD     Post Patient Position  Sitting  -CD     Row Name 09/26/21 1538          Positioning and Restraints    Pre-Treatment Position  in bed  -CD     Post Treatment Position  chair  -CD     In Chair  reclined;call light within reach;encouraged to call for assist;exit alarm on;waffle cushion;on mechanical lift sling;notified nsg;RUE elevated;LUE elevated;legs elevated;L waffle boot  -CD       User Key  (r) = Recorded By, (t) = Taken By, (c) = Cosigned By    Initials Name Provider Type    CD Sury Latham, PT Physical Therapist        Outcome Measures     Row Name 09/26/21 7730          How much help from another person do you currently need...    Turning from your back to your side while in flat bed without using bedrails?  1  -CD      Moving from lying on back to sitting on the side of a flat bed without bedrails?  1  -CD     Moving to and from a bed to a chair (including a wheelchair)?  1  -CD     Standing up from a chair using your arms (e.g., wheelchair, bedside chair)?  1  -CD     Climbing 3-5 steps with a railing?  1  -CD     To walk in hospital room?  1  -CD     AM-PAC 6 Clicks Score (PT)  6  -CD       User Key  (r) = Recorded By, (t) = Taken By, (c) = Cosigned By    Initials Name Provider Type    CD Sury Latham PT Physical Therapist        Physical Therapy Education                 Title: PT OT SLP Therapies (In Progress)     Topic: Physical Therapy (In Progress)     Point: Precautions (In Progress)     Learning Progress Summary           Patient Nonacceptance, E,D, NL by CD at 9/26/2021 1542    Comment: BENEFITS OF OOB ACTIVITY.                               User Key     Initials Effective Dates Name Provider Type Discipline    CD 06/16/21 -  Sury Lathma PT Physical Therapist PT              PT Recommendation and Plan     Plan of Care Reviewed With: patient  Outcome Summary: GOALS NOT ESTABLISHED AS PT APPEARS AT BASELINE WITH FUNCTIONAL MOBILITY. PER CG PT IS DEPENDENT WITH ADL'S AND UTILIZES FELICIA LIFT BED TO W/C AT HOME. WILL DEFER TO NSG FOR CONTINUED LIFT TO RECLINER WITH MECHANICAL LIFT SYSTEM, ROM OF EXTREMITES DURING BATHING AND POSTIONING FOR COMFORT, PRESSURE RELIEF AND EDEMA REDUCTION. PT IS NOT APPROPRIATE FOR ONGOING SKILLED P.T.     Time Calculation:   PT Charges     Row Name 09/26/21 1543             Time Calculation    Start Time  1442  -CD      PT Received On  09/26/21  -CD         Untimed Charges    PT Eval/Re-eval Minutes  64  -CD         Total Minutes    Untimed Charges Total Minutes  64  -CD       Total Minutes  64  -CD        User Key  (r) = Recorded By, (t) = Taken By, (c) = Cosigned By    Initials Name Provider Type    CD Sury Latham PT Physical Therapist        Therapy Charges for Today     Code  Description Service Date Service Provider Modifiers Qty    24037722955 HC PT EVAL LOW COMPLEXITY 4 2021 Sury Latham, PT GP 1    90133322058 HC PT THER SUPP EA 15 MIN 2021 Sury Latham PT GP 2          PT G-Codes  AM-PAC 6 Clicks Score (PT): 6    PT Discharge Summary  Anticipated Discharge Disposition (PT): home with  care    Sury Latham, PT  2021         Electronically signed by Sury Latham PT at 21 1637     Sury Latham PT at 21 1442  Version 1 of 2         Patient Name: Rao Alcazar  : 1945    MRN: [id number]                              Today's Date: 2021       Admit Date: 2021    Visit Dx:     ICD-10-CM ICD-9-CM   1. Pneumonia of both lower lobes due to infectious organism  J18.9 486   2. Acute on chronic respiratory failure with hypoxia (HCC)  J96.21 518.84     799.02     Patient Active Problem List   Diagnosis   • Atypical chest pain   • Cardiomegaly   • Congestive heart failure (CMS/HCC)   • Shortness of breath   • Cardiac mass   • Chest pain   • Mental disorder   • History of panic attacks   • History of seizure disorder   • Mental retardation   • Hyperlipidemia   • Pneumonia of both lower lobes due to infectious organism   • Chronic diastolic CHF (congestive heart failure) (HCC)     Past Medical History:   Diagnosis Date   • Atypical chest pain    • Cardiomegaly    • CHF (congestive heart failure) (CMS/HCC)    • History of panic attacks    • History of seizure disorder    • Mental disorder    • Mental retardation    • Panic attacks    • Seizures (CMS/HCC)      Past Surgical History:   Procedure Laterality Date   • EXPLORATORY LAPAROTOMY       General Information     Row Name 21 1525          Physical Therapy Time and Intention    Document Type  discharge evaluation/summary  -CD     Mode of Treatment  physical therapy  -CD     Row Name 21 1525          General Information    Patient Profile Reviewed  yes  -CD     Prior Level of  "Function  dependent:;transfer;bed mobility;ADL's SPOKE WITH CAREGIVER, NICKO, WHO REPORTS PT IS MOSTLY NONVERBAL AND DEPENDENT WITH ADLS/MOBILITY WITH USE OF FELICIA LIFT FOR UP TO W/C.  -CD     Existing Precautions/Restrictions  fall ??ASPIRATION PNA, NPO.  -CD     Barriers to Rehab  medically complex;previous functional deficit;cognitive status  -CD     Row Name 09/26/21 1525          Living Environment    Lives With  -- PT IS A [employer] OF THE [facility] AND HAS 24/7 CAREGIVERS.  -CD     Row Name 09/26/21 1525          Cognition    Orientation Status (Cognition)  oriented to;person PT ATTEMPTED TO SAY NAME \"SAM\", OTHERWISE RESPONDED \"YEAH\" TO EVERYTHING.  -CD     Row Name 09/26/21 1525          Safety Issues, Functional Mobility    Safety Issues Affecting Function (Mobility)  ability to follow commands;awareness of need for assistance;insight into deficits/self-awareness;safety precaution awareness;sequencing abilities  -CD     Impairments Affecting Function (Mobility)  balance;cognition;strength;motor control  -CD     Cognitive Impairments, Mobility Safety/Performance  attention;awareness, need for assistance;insight into deficits/self-awareness;problem-solving/reasoning;sequencing abilities  -CD     Comment, Safety Issues/Impairments (Mobility)  ONLY FOLLOWED COMMAND TO LIFT FOOT TO DON SOCKS, STRAIGHTEN LEGS AND RAISE L UE. NOT TRACKING CONSISTENTLY IN ROOM.  -CD       User Key  (r) = Recorded By, (t) = Taken By, (c) = Cosigned By    Initials Name Provider Type    CD Sury Latham, PT Physical Therapist        Mobility     Row Name 09/26/21 1531          Bed Mobility    Bed Mobility  rolling left;rolling right  -CD     Rolling Left [place] (Bed Mobility)  dependent (less than 25% patient effort);2 person assist  -CD     Rolling Right [place] (Bed Mobility)  dependent (less than 25% patient effort);2 person assist  -CD     Assistive Device (Bed Mobility)  draw sheet  -CD     Comment (Bed Mobility)  PT UNABLE " TO ASSIST WITH ROLLING DESPITE MAX CUES. ROLLED L/R FOR PLACEMENT OF MECHANICAL LIFT SLING.  -     Row Name 09/26/21 1531          Transfers    Comment (Transfers)  LIFT BED TO CHAIR.  -[facility] Name 09/26/21 1531          Bed-Chair Transfer    Bed-Chair [place] (Transfers)  dependent (less than 25% patient effort);2 person assist  -[facility] Name 09/26/21 1531          Gait/Stairs (Locomotion)    Comment (Gait/Stairs)  PT IS NONAMBULATORY AT BASELINE PER CG.  -CD       User Key  (r) = Recorded By, (t) = Taken By, (c) = Cosigned By    Initials Name Provider Type    Sury Alston PT Physical Therapist        Obj/Interventions     [facility] Name 09/26/21 1532          Range of Motion Comprehensive    General Range of Motion  bilateral upper extremity ROM WFL;bilateral lower extremity ROM WFL  -CD     Comment, General Range of Motion  ALL ROM PASSIVE, AS PT NOT FOLLOWING COMMANDS AND NO PURPOSEFUL MOVEMENT EXCEPT LIFTING LE'S TO KATHY SOCKS.  -[facility] Name 09/26/21 1532          Strength Comprehensive (MMT)    General Manual Muscle Testing (MMT) Assessment  lower extremity strength deficits identified;upper extremity strength deficits identified  -CD     Comment, General Manual Muscle Testing (MMT) Assessment  GENERALIZED WEAKNESS. LIFTED L UE AND B LE FROM EOB PARTIAL RANGE.  -[facility] Name 09/26/21 1532          Motor Skills    Therapeutic Exercise  -- PROM B LE'S- 1 SET OF 10 REPS- AP'S,  LAQ, HIP FLEX.  -CD       User Key  (r) = Recorded By, (t) = Taken By, (c) = Cosigned By    Initials Name Provider Type    Sury Alston PT Physical Therapist        Goals/Plan    No documentation.       Clinical Impression     [facility] Name 09/26/21 1534          Pain    Additional Documentation  Pain Scale: FACES Pre/Post-Treatment (Group)  -[facility] Name 09/26/21 1534          Pain Scale: FACES Pre/Post-Treatment    Pain: FACES Scale, Pretreatment  0-->no hurt  -CD     Posttreatment Pain Rating  0-->no hurt  -CD      Pre/Posttreatment Pain Comment  NAD WITH ROLLING IN BED, ROM ASSESSMENT.  -CD     Row Name 09/26/21 1534          Plan of Care Review    Plan of Care Reviewed With  patient  -CD     Outcome Summary  GOALS NOT ESTABLISHED AS PT APPEARS AT BASELINE WITH FUNCTIONAL MOBILITY. PER CG PT IS DEPENDENT WITH ADL'S AND UTILIZES FELICIA LIFT BED TO W/C AT HOME. WILL DEFER TO NSG FOR CONTINUED LIFT TO RECLINER WITH MECHANICAL LIFT SYSTEM, ROM OF EXTREMITES DURING BATHING AND POSTIONING FOR COMFORT, PRESSURE RELIEF AND EDEMA REDUCTION. PT IS NOT APPROPRIATE FOR ONGOING SKILLED P.T.  -CD     Row Name 09/26/21 1534          Therapy Assessment/Plan (PT)    Patient/Family Therapy Goals Statement (PT)  UNABLE TO STATE.  -CD     Criteria for Skilled Interventions Met (PT)  no;does not meet criteria for skilled intervention  -CD     Row Name 09/26/21 1534          Vital Signs    Post Systolic BP Rehab  121  -CD     Post Treatment Diastolic BP  49  -CD     Posttreatment Heart Rate (beats/min)  62  -CD     Pre SpO2 (%)  98  -CD     O2 Delivery Pre Treatment  supplemental O2 4L  -CD     O2 Delivery Intra Treatment  supplemental O2  -CD     O2 Delivery Post Treatment  supplemental O2  -CD     Pre Patient Position  Supine  -CD     Intra Patient Position  Supine  -CD     Post Patient Position  Sitting  -CD     Row Name 09/26/21 1532          Positioning and Restraints    Pre-Treatment Position  in bed  -CD     Post Treatment Position  chair  -CD     In Chair  reclined;call light within reach;encouraged to call for assist;exit alarm on;waffle cushion;on mechanical lift sling;notified nsg;RUE elevated;LUE elevated;legs elevated;L waffle boot  -CD       User Key  (r) = Recorded By, (t) = Taken By, (c) = Cosigned By    Initials Name Provider Type    CD Sury Latham, PT Physical Therapist        Outcome Measures     Row Name 09/26/21 8028          How much help from another person do you currently need...    Turning from your back to your side  while in flat bed without using bedrails?  1  -CD     Moving from lying on back to sitting on the side of a flat bed without bedrails?  1  -CD     Moving to and from a bed to a chair (including a wheelchair)?  1  -CD     Standing up from a chair using your arms (e.g., wheelchair, bedside chair)?  1  -CD     Climbing 3-5 steps with a railing?  1  -CD     To walk in hospital room?  1  -CD     AM-PAC 6 Clicks Score (PT)  6  -CD       User Key  (r) = Recorded By, (t) = Taken By, (c) = Cosigned By    Initials Name Provider Type    CD Sury Latham PT Physical Therapist        Physical Therapy Education                 Title: PT OT SLP Therapies (In Progress)     Topic: Physical Therapy (In Progress)     Point: Precautions (In Progress)     Learning Progress Summary           Patient Nonacceptance, E,D, NL by CD at 9/26/2021 1542    Comment: BENEFITS OF OOB ACTIVITY.                               User Key     Initials Effective Dates Name Provider Type Discipline    CD 06/16/21 -  Sury Latham PT Physical Therapist PT              PT Recommendation and Plan     Plan of Care Reviewed With: patient  Outcome Summary: GOALS NOT ESTABLISHED AS PT APPEARS AT BASELINE WITH FUNCTIONAL MOBILITY. PER CG PT IS DEPENDENT WITH ADL'S AND UTILIZES FELICIA LIFT BED TO W/C AT HOME. WILL DEFER TO NSG FOR CONTINUED LIFT TO RECLINER WITH MECHANICAL LIFT SYSTEM, ROM OF EXTREMITES DURING BATHING AND POSTIONING FOR COMFORT, PRESSURE RELIEF AND EDEMA REDUCTION. PT IS NOT APPROPRIATE FOR ONGOING SKILLED P.T.     Time Calculation:   PT Charges     Row Name 09/26/21 1543             Time Calculation    Start Time  1442  -CD      PT Received On  09/26/21  -CD         Untimed Charges    PT Eval/Re-eval Minutes  64  -CD         Total Minutes    Untimed Charges Total Minutes  64  -CD       Total Minutes  64  -CD        User Key  (r) = Recorded By, (t) = Taken By, (c) = Cosigned By    Initials Name Provider Type    CD Sury Latham PT Physical  Therapist        Therapy Charges for Today     Code Description Service Date Service Provider Modifiers Qty    58466300164 HC PT EVAL LOW COMPLEXITY 4 2021 Sury Latham, PT GP 1          PT G-Codes  AM-PAC 6 Clicks Score (PT): 6    PT Discharge Summary  Anticipated Discharge Disposition (PT): home with  care    Sury Latham, PT  2021         Electronically signed by Sury Latham, PT at 21 1546       Occupational Therapy Notes (most recent note)    No notes exist for this encounter.            Speech Language Pathology Notes (most recent note)      Ariana Smalls MS CCC-SLP at 10/06/21 1525          Acute Care - Speech Language Pathology   Swallow Treatment Note  Andra     Patient Name: Rao Alcazar  : 1945  MRN: [id number]  Today's Date: 10/6/2021               Admit Date: 2021    Visit Dx:     ICD-10-CM ICD-9-CM   1. Pneumonia of both lower lobes due to infectious organism  J18.9 486   2. Acute on chronic respiratory failure with hypoxia (HCC)  J96.21 518.84     799.02   3. Oropharyngeal dysphagia  R13.12 787.22     Patient Active Problem List   Diagnosis   • Atypical chest pain   • Cardiomegaly   • Congestive heart failure (HCC)   • Shortness of breath   • Cardiac mass   • Chest pain   • Mental disorder   • History of panic attacks   • History of seizure disorder   • Mental retardation   • Hyperlipidemia   • Pneumonia of both lower lobes due to infectious organism   • Chronic diastolic CHF (congestive heart failure) (HCC)     Past Medical History:   Diagnosis Date   • Atypical chest pain    • Cardiomegaly    • CHF (congestive heart failure) (CMS/HCC)    • History of panic attacks    • History of seizure disorder    • Mental disorder    • Mental retardation    • Panic attacks    • Seizures (CMS/HCC)      Past Surgical History:   Procedure Laterality Date   • EXPLORATORY LAPAROTOMY         SLP Recommendation and Plan  SLP Swallowing Diagnosis: mod-severe, oral  dysphagia, severe, pharyngeal dysphagia (10/06/21 1430)  SLP Diet Recommendation: puree, nectar thick liquids, other (see comments) (COMFORT DIET) (10/06/21 1430)  Recommended Precautions and Strategies: upright posture during/after eating, small bites of food and sips of liquid, no straw, general aspiration precautions, 1:1 supervision, assist with feeding (10/06/21 1430)  SLP Rec. for Method of Medication Administration: meds crushed, with pudding or applesauce, as tolerated, meds via alternate route (10/06/21 1430)     Monitor for Signs of Aspiration: yes, notify SLP if any concerns (10/06/21 1430)     Swallow Criteria for Skilled Therapeutic Interventions Met: no significant expected improvement in functional status (10/06/21 1430)  Anticipated Discharge Disposition (SLP): unknown (10/06/21 1430)  Rehab Potential/Prognosis, Swallowing: re-evaluate goals as necessary (10/06/21 1430)  Therapy Frequency (Swallow): evaluation only (10/06/21 1430)        Daily Summary of Progress (SLP): progress toward functional goals as expected (10/06/21 1430)    Plan for Continued Treatment (SLP): Saw for dysphagia treatment for f/u on comfort diet. Pt now comfort measures & on comfort diet. Pt previously was on nectar-thick & pureed prior to admission. Given severity of s/s of discomfort & aspiration w/ thins, may be best to continue previous diet. No immediate s/s of discomfort w/ nectar-thick via tsp/cup or pureed/pudding trials. Pt unable to pull liquid via straw. Continue comfort diet w/ assist & no straws. No further SLP dysphagia needs at this time. Re-consult if any changes. (10/06/21 1430)              Plan of Care Reviewed With: patient  Progress: no change         SWALLOW EVALUATION (last 72 hours)      SLP Adult Swallow Evaluation     Row Name 10/06/21 1430             Rehab Evaluation    Document Type  therapy note (daily note)  -RD      Subjective Information  no complaints  -RD      Patient Observations   alert;cooperative  -RD      Patient/Family/Caregiver Comments/Observations  no family present  -RD      Care Plan Review  evaluation/treatment results reviewed;care plan/treatment goals reviewed;risks/benefits reviewed;current/potential barriers reviewed  -RD      Patient Effort  adequate  -RD      Symptoms Noted During/After Treatment  --         General Information    Patient Profile Reviewed  --         Pain    Additional Documentation  Pain Scale: FACES Pre/Post-Treatment (Group)  -RD         Pain Scale: FACES Pre/Post-Treatment    Pain: FACES Scale, Pretreatment  0-->no hurt  -RD      Posttreatment Pain Rating  0-->no hurt  -RD         Clinical Impression    Daily Summary of Progress (SLP)  progress toward functional goals as expected  -RD      SLP Swallowing Diagnosis  mod-severe;oral dysphagia;severe;pharyngeal dysphagia  -RD      Functional Impact  risk of aspiration/pneumonia;risk of malnutrition;risk of dehydration  -RD      Rehab Potential/Prognosis, Swallowing  re-evaluate goals as necessary  -RD      Swallow Criteria for Skilled Therapeutic Interventions Met  no significant expected improvement in functional status  -RD      Plan for Continued Treatment (SLP)  Saw for dysphagia treatment for f/u on comfort diet. Pt now comfort measures & on comfort diet. Pt previously was on nectar-thick & pureed prior to admission. Given severity of s/s of discomfort & aspiration w/ thins, may be best to continue previous diet. No immediate s/s of discomfort w/ nectar-thick via tsp/cup or pureed/pudding trials. Pt unable to pull liquid via straw. Continue comfort diet w/ assist & no straws. No further SLP dysphagia needs at this time. Re-consult if any changes.  -RD         Recommendations    Therapy Frequency (Swallow)  evaluation only  -RD      Predicted Duration Therapy Intervention (Days)  --      SLP Diet Recommendation  puree;nectar thick liquids;other (see comments) COMFORT DIET  -RD      Recommended Precautions  and Strategies  upright posture during/after eating;small bites of food and sips of liquid;no straw;general aspiration precautions;1:1 supervision;assist with feeding  -RD      Oral Care Recommendations  Oral Care BID/PRN;Suction toothbrush  -RD      SLP Rec. for Method of Medication Administration  meds crushed;with pudding or applesauce;as tolerated;meds via alternate route  -RD      Monitor for Signs of Aspiration  yes;notify SLP if any concerns  -RD      Anticipated Discharge Disposition (SLP)  unknown  -RD        User Key  (r) = Recorded By, (t) = Taken By, (c) = Cosigned By    Initials Name Effective Dates    RD Ariana Smalls, MS CCC-SLP 06/16/21 -     CH Radha Kapoor MS CCC-SLP 06/16/21 -           EDUCATION  The patient has been educated in the following areas:   Dysphagia (Swallowing Impairment) Oral Care/Hydration Modified Diet Instruction comfort diet.       SLP GOALS     Row Name 10/06/21 1430 10/05/21 1300 10/04/21 1300       Oral Nutrition/Hydration Goal 1 (SLP)    Oral Nutrition/Hydration Goal 1, SLP  LTG: Pt will return to PO diet w/o s/s of aspiration w/ 100% accuracy w/o cues  -RD  LTG: Pt will return to PO diet w/o s/s of aspiration w/ 100% accuracy w/o cues  -CH  LTG: Pt will return to PO diet w/o s/s of aspiration w/ 100% accuracy w/o cues  -CH    Time Frame (Oral Nutrition/Hydration Goal 1, SLP)  by discharge  -RD  by discharge  -CH  by discharge  -CH    Progress/Outcomes (Oral Nutrition/Hydration Goal 1, SLP)  goal no longer appropriate  -RD  progress slower than expected  -CH  progress slower than expected  -CH       Oral Nutrition/Hydration Goal 2 (SLP)    Oral Nutrition/Hydration Goal 2, SLP  Pt will tolerate therapeutic H2O trials w/o s/s of aspiration w/ 60% accuracy to indicate readiness for repeat instrumental eval.  -RD  Pt will tolerate therapeutic H2O trials w/o s/s of aspiration w/ 60% accuracy to indicate readiness for repeat instrumental eval.  -CH  Pt will tolerate  therapeutic H2O trials w/o s/s of aspiration w/ 60% accuracy to indicate readiness for repeat instrumental eval.  -CH    Time Frame (Oral Nutrition/Hydration Goal 2, SLP)  short term goal (STG)  -RD  short term goal (STG)  -CH  short term goal (STG)  -CH    Barriers (Oral Nutrition/Hydration Goal 2, SLP)  pt now on comfort diet & comfort measures. Pt appears most comfortable w/ nectar-thick via tsp/cup & pureed. this was pt's diet prior to admission. NG now removed. No further needs  -RD  delayed cough following trials of ice chips and immediate cough following thin H2O via teaspoon. Multiple swallows with pureed trials.  -CH  Continued overt s/s of aspiration w/ therapeutic ice, thin and nectar thick liquid trials c/b wet vocal quality & coughing w/ suctioning. Not ready for repeat instrumental eval at this time  -CH    Progress/Outcomes (Oral Nutrition/Hydration Goal 2, SLP)  goal no longer appropriate  -RD  progress slower than expected  -CH  progress slower than expected  -CH      User Key  (r) = Recorded By, (t) = Taken By, (c) = Cosigned By    Initials Name Provider Type    Ariana Perkins MS CCC-SLP Speech and Language Pathologist    Radha Savage MS CCC-SLP Speech and Language Pathologist             Time Calculation:   Time Calculation- SLP     Row Name 10/06/21 1531             Time Calculation- SLP    SLP Start Time  1430  -RD      SLP Received On  10/06/21  -RD         Untimed Charges    35708-[id number] Treatment Swallow Minutes  38  -RD         Total Minutes    Untimed Charges Total Minutes  38  -RD       Total Minutes  38  -RD        User Key  (r) = Recorded By, (t) = Taken By, (c) = Cosigned By    Initials Name Provider Type    Ariana Perkins MS CCC-SLP Speech and Language Pathologist          Therapy Charges for Today     Code Description Service Date Service Provider Modifiers Qty    [id number] HC ST TREATMENT SWALLOW 3 10/6/2021 Ariana Smalls MS CCC-SLP GN 1      Patient was  not wearing a face mask and did not exhibit coughing during this therapy encounter.  Procedure performed was aerosolizing, involved close contact (within 6 feet for at least 15 minutes or longer), and did not involve contact with infectious secretions or specimens.  Therapist used appropriate personal protective equipment including gloves, standard procedure mask and eye protection.  Appropriate PPE was worn during the entire therapy session.  Hand hygiene was completed before and after therapy session.          Ariana Smalls MS CCC-SLP  10/6/2021    Electronically signed by Ariana Smalls MS CCC-SLP at 10/06/21 5683

## 2021-10-07 NOTE — PROGRESS NOTES
Continued Stay Note  [facility]     Patient Name: Rao Alcazar  MRN: [id number]  Today's Date: 10/7/2021    Admit Date: 9/24/2021    Discharge Plan     Row Name 10/07/21 1325       Plan    Plan  Long term care    Plan Comments  Telephone call received from Vandana the [facility] Medical Coordinator, [place] has been involved with pt's care. Vandana stated [facility] is no longer able to care for pt in the group home, as pt's medical needs exceed what the home is able to provide. Griselda Carrillo of above information. Will continue to follow. Please call [phone] if can be of further assistance.        Discharge Codes    No documentation.       Expected Discharge Date and Time     Expected Discharge Date Expected Discharge Time    Oct 1, 2021             Susan George RN

## 2021-10-07 NOTE — PLAN OF CARE
Problem: Adult Inpatient Plan of Care  Goal: Plan of Care Review  Flowsheets (Taken 10/7/2021 1059)  Progress: no change  Plan of Care Reviewed With: ([facility] - [place] [phone])   guardian   other (see comments)  Outcome Summary: Patient awake, asking for bubble gum and pudding.  Patient eating with assist.  [facility] will not be able to accept patient back to the group home.   Plan per guarandianship worker plan will be nursing facility long term care with hospice - would prefer [facility] or close to family that lives in [place] (sister) or [place] (brother). Demographics/Emergency contacts updated per guardianship - mobile/preferred number for guardianship worker corrected, and [place] caregiver removed.  Hospice CM, CM, Hospital Medicine, Palliative Medicine, and Nurse updated.     Problem: Palliative Care  Goal: Enhanced Quality of Life  Outcome: Ongoing, Progressing  Intervention: Promote Advance Care Planning  Recent Flowsheet Documentation  Taken 10/7/2021 1059 by Zoe Carrillo, MSW  Life Transition/Adjustment: (per guardianship will need ltc placement)   decision-making facilitated-nursing facility with Hospice   end-of-life care-Hospice CM working to facilitate hospice support at d/c; Hospice Comfort approval 10/5/21  Intervention: Optimize Psychosocial Wellbeing  Recent Flowsheet Documentation  Taken 10/7/2021 1059 by Zoe Carrillo, MSW  Supportive Measures: (symptom assessment)   active listening utilized   positive reinforcement provided   other (see comments)  Family/Support System Care: (discussed plan of care with guardianship worker)   support provided   other (see comments)  Multiple conversations this morning regarding patient plan of care and disposition.  Per Hospice CM CELIA George RN, [facility] will not accept patient back to group home.  This worker confirmed in phone conversation with patient's guardianship worker Veronica Rao - will need to pursue nursing facility  long term care with Hospice.  She requests [facility] or surrounding counties that may be close to patient's siblings (Kasandra-sister, Marjorie-brother).  Updated contact information per guardianship as [place] is no longer part of pt plan of care, and corrected number for guardianship worker. Hospice CM, RN, attending physician, palliative made aware.  Visit with patient at bedside, resting calmly watching television, requesting bubble gum and pudding, eating bites of breakfast and sips of juice while requesting pudding - d/w RN.

## 2021-10-07 NOTE — CASE MANAGEMENT/SOCIAL WORK
Continued Stay Note  [facility]     Patient Name: Rao Alcazar  MRN: [id number]  Today's Date: 10/7/2021    Admit Date: 9/24/2021    Discharge Plan     Row Name 10/07/21 1554       Plan    Plan Comments  Referrals made today include [facility], [place], [facility], [place], [facility], [facility], [facility], [facility] [place] and [place]. [facility] do not have a long term bed in [place]. [facility] do not have a long term male hospice bed available at this time.            Discharge Codes    No documentation.       Expected Discharge Date and Time     Expected Discharge Date Expected Discharge Time    Oct 1, 2021             CHELSEY Jurado

## 2021-10-08 LAB
GLUCOSE BLDC GLUCOMTR-MCNC: 134 MG/DL (ref 70–130)
GLUCOSE BLDC GLUCOMTR-MCNC: 80 MG/DL (ref 70–130)

## 2021-10-08 PROCEDURE — 25010000002 HEPARIN (PORCINE) PER 1000 UNITS: Performed by: NURSE PRACTITIONER

## 2021-10-08 PROCEDURE — 99232 SBSQ HOSP IP/OBS MODERATE 35: CPT | Performed by: NURSE PRACTITIONER

## 2021-10-08 PROCEDURE — 82962 GLUCOSE BLOOD TEST: CPT

## 2021-10-08 RX ADMIN — LEVETIRACETAM 2000 MG: 100 SOLUTION ORAL at 08:07

## 2021-10-08 RX ADMIN — HEPARIN SODIUM 5000 UNITS: 5000 INJECTION, SOLUTION INTRAVENOUS; SUBCUTANEOUS at 05:15

## 2021-10-08 RX ADMIN — ATORVASTATIN CALCIUM 10 MG: 10 TABLET, FILM COATED ORAL at 21:21

## 2021-10-08 RX ADMIN — ASPIRIN 81 MG CHEWABLE TABLET 81 MG: 81 TABLET CHEWABLE at 08:08

## 2021-10-08 RX ADMIN — SODIUM CHLORIDE, PRESERVATIVE FREE 10 ML: 5 INJECTION INTRAVENOUS at 21:21

## 2021-10-08 RX ADMIN — LEVETIRACETAM 2000 MG: 100 SOLUTION ORAL at 21:20

## 2021-10-08 RX ADMIN — DOCUSATE SODIUM 50 MG AND SENNOSIDES 8.6 MG 2 TABLET: 8.6; 5 TABLET, FILM COATED ORAL at 08:08

## 2021-10-08 RX ADMIN — PHENYTOIN 125 MG: 125 SUSPENSION ORAL at 15:00

## 2021-10-08 RX ADMIN — PHENYTOIN 125 MG: 125 SUSPENSION ORAL at 21:21

## 2021-10-08 RX ADMIN — HEPARIN SODIUM 5000 UNITS: 5000 INJECTION, SOLUTION INTRAVENOUS; SUBCUTANEOUS at 21:20

## 2021-10-08 RX ADMIN — HEPARIN SODIUM 5000 UNITS: 5000 INJECTION, SOLUTION INTRAVENOUS; SUBCUTANEOUS at 14:53

## 2021-10-08 RX ADMIN — MINERAL OIL: 1000 LIQUID ORAL at 14:54

## 2021-10-08 RX ADMIN — MINERAL OIL: 1000 LIQUID ORAL at 21:21

## 2021-10-08 RX ADMIN — SODIUM CHLORIDE, PRESERVATIVE FREE 10 ML: 5 INJECTION INTRAVENOUS at 08:08

## 2021-10-08 RX ADMIN — LEVOTHYROXINE SODIUM 25 MCG: 25 TABLET ORAL at 05:15

## 2021-10-08 RX ADMIN — DOCUSATE SODIUM 50 MG AND SENNOSIDES 8.6 MG 2 TABLET: 8.6; 5 TABLET, FILM COATED ORAL at 21:20

## 2021-10-08 RX ADMIN — PHENYTOIN 125 MG: 125 SUSPENSION ORAL at 08:07

## 2021-10-08 NOTE — PLAN OF CARE
Goal Outcome Evaluation:            Pt alert. Pt ate three meals with the assistance of staff.

## 2021-10-08 NOTE — PROGRESS NOTES
"    [facility] Medicine Services  PROGRESS NOTE    Patient Name: Rao Alcazar  : 1945  MRN: [id number]    Date of Admission: 2021  Primary Care Physician: Provider, No Known    Subjective   Subjective     CC: f/u dysphagia    HPI:   Nothing new from staff. Patient continues to say \"yes\" to all my questions. Appears comfortable in bed.     ROS:  UTO due to disability     Objective   Objective     Vital Signs:   Temp:  [98.2 °F (36.8 °C)-98.6 °F (37 °C)] 98.2 °F (36.8 °C)  Heart Rate:  [66-95] 77  Resp:  [14-16] 16  BP: ()/(40-71) 101/71  Flow (L/min):  [1-2] 2     Physical Exam:  Constitutional: No acute distress, awake, alert  HENT: NCAT, mucous membranes moist  Respiratory: Clear to auscultation bilaterally, respiratory effort normal   Cardiovascular: RRR, no murmurs, rubs, or gallops  Gastrointestinal: Positive bowel sounds, soft, nontender, nondistended  Musculoskeletal: No bilateral ankle edema  Psychiatric: Appropriate affect, cooperative  Neurologic: Oriented x 3, CRABTREE, not following commands, speech difficult to understand   Skin: No rashes       Results Reviewed:  LAB RESULTS:      Lab 10/04/21  0653   WBC 4.28   HEMOGLOBIN 14.9   HEMATOCRIT 44.1   PLATELETS 227   NEUTROS ABS 2.11   IMMATURE GRANS (ABS) 0.02   LYMPHS ABS 1.28   MONOS ABS 0.50   EOS ABS 0.34   MCV 93.0         Lab 10/04/21  0653 10/02/21  1534   SODIUM 134* 137   POTASSIUM 3.8 3.8   CHLORIDE 99 100   CO2 27.0 32.0*   ANION GAP 8.0 5.0   BUN 4* 3*   CREATININE 0.42* 0.38*   GLUCOSE 101* 115*   CALCIUM 8.8 8.9   MAGNESIUM 1.9  --    PHOSPHORUS 3.0  --          Lab 10/04/21  0653   TOTAL PROTEIN 7.6   ALBUMIN 3.30*   ALT (SGPT) 30   AST (SGOT) 32   BILIRUBIN 0.4   ALK PHOS 134*             Lab 10/04/21  0653   CHOLESTEROL 177   TRIGLYCERIDES 136             Brief Urine Lab Results  (Last result in the past 365 days)      Color   Clarity   Blood   Leuk Est   Nitrite   Protein   CREAT   Urine HCG        " 09/24/21 1203 Yellow Clear Negative Negative Negative Negative               Microbiology Results Abnormal     Procedure Component Value - Date/Time    Blood Culture - Blood, Arm, Right [[id number]] Collected: 09/24/21 1150    Lab Status: Final result Specimen: Blood from Arm, Right Updated: 09/29/21 1231     Blood Culture No growth at 5 days    Blood Culture - Blood, Arm, Left [[id number]] Collected: 09/24/21 1150    Lab Status: Final result Specimen: Blood from Arm, Left Updated: 09/29/21 1231     Blood Culture No growth at 5 days    COVID PRE-OP / PRE-PROCEDURE SCREENING ORDER (NO ISOLATION) - Swab, Nasopharynx [[id number]]  (Normal) Collected: 09/24/21 1208    Lab Status: Final result Specimen: Swab from Nasopharynx Updated: 09/24/21 1236    Narrative:      The following orders were created for panel order COVID PRE-OP / PRE-PROCEDURE SCREENING ORDER (NO ISOLATION) - Swab, Nasopharynx.  Procedure                               Abnormality         Status                     ---------                               -----------         ------                     COVID-19 and FLU A/B PCR...[[id number]]  Normal              Final result                 Please view results for these tests on the individual orders.    COVID-19 and FLU A/B PCR - Swab, Nasopharynx [[id number]]  (Normal) Collected: 09/24/21 1208    Lab Status: Final result Specimen: Swab from Nasopharynx Updated: 09/24/21 1236     COVID19 Not Detected     Influenza A PCR Not Detected     Influenza B PCR Not Detected    Narrative:      Fact sheet for providers: https://www.fda.gov/media/121246/download    Fact sheet for patients: https://www.fda.gov/media/824318/download    Test performed by PCR.        CXR personally reviewed showing bilateral opacities. Agree with interpretation.    Results for orders placed during the hospital encounter of 09/24/21    Adult Transthoracic Echo Complete W/ Cont if Necessary Per Protocol    Interpretation Summary  · Normal left  ventricular systolic function, estimated EF 60%.  · Aortic sclerosis without aortic stenosis.  · Trace aortic insufficiency.  · Trace mitral regurgitation, trace tricuspid regurgitation, normal RVSP.      I have reviewed the medications:  Scheduled Meds:aspirin, 81 mg, Oral, Daily   Or  aspirin, 300 mg, Rectal, Daily  atorvastatin, 10 mg, Oral, Nightly  heparin (porcine), 5,000 Units, Subcutaneous, Q8H  levETIRAcetam, 2,000 mg, Oral, Q12H  levothyroxine, 25 mcg, Oral, Q AM  palliative care oral rinse, , Mouth/Throat, TID - RT  phenytoin, 125 mg, Oral, TID  senna-docusate sodium, 2 tablet, Oral, BID  sodium chloride, 10 mL, Intravenous, Q12H      Continuous Infusions:   PRN Meds:.•  acetaminophen  •  senna-docusate sodium **AND** polyethylene glycol **AND** bisacodyl **AND** bisacodyl  •  sodium chloride  •  sodium chloride    Assessment/Plan   Assessment & Plan     Active Hospital Problems    Diagnosis  POA   • Pneumonia of both lower lobes due to infectious organism [J18.9]  Yes   • Chronic diastolic CHF (congestive heart failure) (HCC) [I50.32]  Unknown   • Hyperlipidemia [E78.5]  Yes   • Mental disorder [F99]  Yes   • History of seizure disorder [Z86.69]  Not Applicable   • Cardiomegaly [I51.7]  Yes      Resolved Hospital Problems   No resolved problems to display.        Brief Hospital Course to date:  Rao Alcazar is a 76 y.o. male patient with severe intellectual disability, nonverbal at baseline (guardian of state), with history of dyslipidemia, seizure disorder and chronic hypoxic respiratory failure on home oxygen at 2 L/min presented to the hospital with fever and cough and was found to have bilateral basal pneumonia. This is my first day assessing patient's active medical issues.     This patient's problems and plans were partially entered by my partner and updated as appropriate by me 10/08/21.    Assessment/plan:    GOC  -My partner prior had d/w patient's family. This patient suffers from chronic  "conditions such as seizure disorder, cognitive delay and heart failure. DNR/DNI is appropriate as this would only prolong suffering and would most likely not be successful -guardian in agreement - changed to DNR/DNI.  -Most currently this patient has been diagnosed with severe dysphagia. He has been receiving nutrition via an NG tube which is temporary but he has pulled at this tube and continues trying to push it out with his mouth. His sitter, whom knows him well, states that he \"lives to eat\". A comfort diet along with comfort measures would be most appropriate as a PEG placement and any further aggressive treatment would only prolong suffering. He is progressive in his chronic conditions with no improvement foreseen.      Aspiration pneumonia  Dysphagia   -CT chest showed bilateral pulmonary infiltrates and pleural effusion, concerning for community acquired pneumonia  -Has completed course of IV abx.  -Continue comfort diet.  -CM/SW following for disposition, looking for LTC facility     Seizure disorder  -Keppra and Dilantin  -Seizure precautions, stable     Elevated alk phos, trending down    Diastolic heart failure, not in decompensation  -echo EF 60%, aortic stenosis, normal RVSP   -Compensated. Continue aspirin     Hyperlipidemia  -Continue statin     Severe intellectual disability   Nonverbal   --Stable to baseline    DVT prophylaxis:  Medical DVT prophylaxis orders are present.       AM-PAC 6 Clicks Score (PT): 6 (10/08/21 0900)    Disposition: I expect the patient to be discharged TBD.  Pending CM finding a facility for long term care and hospice    CODE STATUS:   Code Status and Medical Interventions:   Ordered at: 10/05/21 1420     Code Status:    No CPR     Medical Interventions (Level of Support Prior to Arrest):    Comfort Measures       Jing Tran, MONIKA  10/08/21            "

## 2021-10-08 NOTE — CASE MANAGEMENT/SOCIAL WORK
Continued Stay Note  [facility]     Patient Name: Rao Alcazar  MRN: [id number]  Today's Date: 10/8/2021    Admit Date: 9/24/2021    Discharge Plan     Row Name 10/08/21 1600       Plan    Plan Comments  MSW was contacted by the [facility] liaison, reporting that they are unable to accept pt at their [facility] facilities nor [facility] in [place]. NALINI was contacted by Tom with admissions at [facility]. Tom asked questions about the Hospice care if pt were to come to their facility. NALINI verified and asked Tom's questions to hospice Case manager, Nichole. NALINI called and let Tom know the answers. Still pending referrals.        Discharge Codes    No documentation.       Expected Discharge Date and Time     Expected Discharge Date Expected Discharge Time    Oct 1, 2021             CHELSEY Jurado

## 2021-10-08 NOTE — PLAN OF CARE
Goal Outcome Evaluation:  Plan of Care Reviewed With: other (see comments) (pt unable to discuss)        Progress: no change  Outcome Summary: Pt sleeping at time of Palliative RN encounter. PCT reported pt ated nearly everything on his lunch tray, had been cleaned, turned, no comlaints of discomfort. Palliative following for continued support pending placement.    1300 Palliative Team Conference: EDWARDO Bridges RN, CHPN; SHEYLA Carrillo LCSW, [facility]-; QUITA Vázquez, DO; NEHEMIAH Otto, RN, PN; CONNIE Shafer, APRN; CELIA George, RN, CHPN

## 2021-10-09 LAB
GLUCOSE BLDC GLUCOMTR-MCNC: 108 MG/DL (ref 70–130)
GLUCOSE BLDC GLUCOMTR-MCNC: 113 MG/DL (ref 70–130)

## 2021-10-09 PROCEDURE — 25010000002 HEPARIN (PORCINE) PER 1000 UNITS: Performed by: NURSE PRACTITIONER

## 2021-10-09 PROCEDURE — 99232 SBSQ HOSP IP/OBS MODERATE 35: CPT | Performed by: NURSE PRACTITIONER

## 2021-10-09 PROCEDURE — 82962 GLUCOSE BLOOD TEST: CPT

## 2021-10-09 RX ORDER — CASTOR OIL AND BALSAM, PERU 788; 87 MG/G; MG/G
1 OINTMENT TOPICAL EVERY 12 HOURS SCHEDULED
Status: DISCONTINUED | OUTPATIENT
Start: 2021-10-09 | End: 2021-10-19 | Stop reason: HOSPADM

## 2021-10-09 RX ADMIN — PHENYTOIN 125 MG: 125 SUSPENSION ORAL at 23:00

## 2021-10-09 RX ADMIN — MINERAL OIL: 1000 LIQUID ORAL at 23:01

## 2021-10-09 RX ADMIN — MINERAL OIL: 1000 LIQUID ORAL at 13:47

## 2021-10-09 RX ADMIN — ASPIRIN 81 MG CHEWABLE TABLET 81 MG: 81 TABLET CHEWABLE at 09:00

## 2021-10-09 RX ADMIN — ATORVASTATIN CALCIUM 10 MG: 10 TABLET, FILM COATED ORAL at 22:56

## 2021-10-09 RX ADMIN — DOCUSATE SODIUM 50 MG AND SENNOSIDES 8.6 MG 2 TABLET: 8.6; 5 TABLET, FILM COATED ORAL at 22:56

## 2021-10-09 RX ADMIN — PHENYTOIN 125 MG: 125 SUSPENSION ORAL at 17:47

## 2021-10-09 RX ADMIN — DOCUSATE SODIUM 50 MG AND SENNOSIDES 8.6 MG 2 TABLET: 8.6; 5 TABLET, FILM COATED ORAL at 09:01

## 2021-10-09 RX ADMIN — LEVETIRACETAM 2000 MG: 100 SOLUTION ORAL at 09:02

## 2021-10-09 RX ADMIN — HEPARIN SODIUM 5000 UNITS: 5000 INJECTION, SOLUTION INTRAVENOUS; SUBCUTANEOUS at 13:43

## 2021-10-09 RX ADMIN — CASTOR OIL AND BALSAM, PERU 1 APPLICATION: 788; 87 OINTMENT TOPICAL at 23:02

## 2021-10-09 RX ADMIN — LEVOTHYROXINE SODIUM 25 MCG: 25 TABLET ORAL at 05:22

## 2021-10-09 RX ADMIN — HEPARIN SODIUM 5000 UNITS: 5000 INJECTION, SOLUTION INTRAVENOUS; SUBCUTANEOUS at 05:22

## 2021-10-09 RX ADMIN — CASTOR OIL AND BALSAM, PERU 1 APPLICATION: 788; 87 OINTMENT TOPICAL at 15:06

## 2021-10-09 RX ADMIN — HEPARIN SODIUM 5000 UNITS: 5000 INJECTION, SOLUTION INTRAVENOUS; SUBCUTANEOUS at 23:01

## 2021-10-09 RX ADMIN — LEVETIRACETAM 2000 MG: 100 SOLUTION ORAL at 22:58

## 2021-10-09 RX ADMIN — SODIUM CHLORIDE, PRESERVATIVE FREE 10 ML: 5 INJECTION INTRAVENOUS at 09:25

## 2021-10-09 RX ADMIN — MINERAL OIL: 1000 LIQUID ORAL at 10:03

## 2021-10-09 RX ADMIN — PHENYTOIN 125 MG: 125 SUSPENSION ORAL at 09:02

## 2021-10-09 NOTE — PROGRESS NOTES
[facility] Medicine Services  PROGRESS NOTE    Patient Name: Rao Alcazar  : 1945  MRN: [id number]    Date of Admission: 2021  Primary Care Physician: Provider, No Known    Subjective   Subjective     CC: f/u dysphagia    HPI:   Continuously asking for more pudding. No other statements. No needs from nursing.     ROS:  UTO due to disability     Objective   Objective     Vital Signs:   Temp:  [97.7 °F (36.5 °C)-99 °F (37.2 °C)] 99 °F (37.2 °C)  Heart Rate:  [76-79] 76  Resp:  [16] 16  BP: (103-110)/(57-68) 105/57     Physical Exam:  Constitutional: No acute distress, awake, alert  HENT: NCAT, mucous membranes moist  Respiratory: Clear to auscultation bilaterally, respiratory effort normal   Cardiovascular: RRR, no murmurs, rubs, or gallops  Gastrointestinal: Positive bowel sounds, soft, nontender, nondistended  Musculoskeletal: No bilateral ankle edema  Psychiatric: Appropriate affect, cooperative  Neurologic: Oriented x 3, CRABTREE, not following commands, speech midly difficult to understand   Skin: No rashes       Results Reviewed:  LAB RESULTS:      Lab 10/04/21  0653   WBC 4.28   HEMOGLOBIN 14.9   HEMATOCRIT 44.1   PLATELETS 227   NEUTROS ABS 2.11   IMMATURE GRANS (ABS) 0.02   LYMPHS ABS 1.28   MONOS ABS 0.50   EOS ABS 0.34   MCV 93.0         Lab 10/04/21  0653 10/02/21  1534   SODIUM 134* 137   POTASSIUM 3.8 3.8   CHLORIDE 99 100   CO2 27.0 32.0*   ANION GAP 8.0 5.0   BUN 4* 3*   CREATININE 0.42* 0.38*   GLUCOSE 101* 115*   CALCIUM 8.8 8.9   MAGNESIUM 1.9  --    PHOSPHORUS 3.0  --          Lab 10/04/21  0653   TOTAL PROTEIN 7.6   ALBUMIN 3.30*   ALT (SGPT) 30   AST (SGOT) 32   BILIRUBIN 0.4   ALK PHOS 134*             Lab 10/04/21  0653   CHOLESTEROL 177   TRIGLYCERIDES 136             Brief Urine Lab Results  (Last result in the past 365 days)      Color   Clarity   Blood   Leuk Est   Nitrite   Protein   CREAT   Urine HCG        21 1203 Yellow   Clear   Negative    Negative   Negative   Negative                 Microbiology Results Abnormal     Procedure Component Value - Date/Time    Blood Culture - Blood, Arm, Right [[id number]] Collected: 09/24/21 1150    Lab Status: Final result Specimen: Blood from Arm, Right Updated: 09/29/21 1231     Blood Culture No growth at 5 days    Blood Culture - Blood, Arm, Left [[id number]] Collected: 09/24/21 1150    Lab Status: Final result Specimen: Blood from Arm, Left Updated: 09/29/21 1231     Blood Culture No growth at 5 days    COVID PRE-OP / PRE-PROCEDURE SCREENING ORDER (NO ISOLATION) - Swab, Nasopharynx [[id number]]  (Normal) Collected: 09/24/21 1208    Lab Status: Final result Specimen: Swab from Nasopharynx Updated: 09/24/21 1236    Narrative:      The following orders were created for panel order COVID PRE-OP / PRE-PROCEDURE SCREENING ORDER (NO ISOLATION) - Swab, Nasopharynx.  Procedure                               Abnormality         Status                     ---------                               -----------         ------                     COVID-19 and FLU A/B PCR...[[id number]]  Normal              Final result                 Please view results for these tests on the individual orders.    COVID-19 and FLU A/B PCR - Swab, Nasopharynx [[id number]]  (Normal) Collected: 09/24/21 1208    Lab Status: Final result Specimen: Swab from Nasopharynx Updated: 09/24/21 1236     COVID19 Not Detected     Influenza A PCR Not Detected     Influenza B PCR Not Detected    Narrative:      Fact sheet for providers: https://www.fda.gov/media/367823/download    Fact sheet for patients: https://www.fda.gov/media/443065/download    Test performed by PCR.        CXR personally reviewed showing bilateral opacities. Agree with interpretation.    Results for orders placed during the hospital encounter of 09/24/21    Adult Transthoracic Echo Complete W/ Cont if Necessary Per Protocol    Interpretation Summary  · Normal left ventricular systolic function,  estimated EF 60%.  · Aortic sclerosis without aortic stenosis.  · Trace aortic insufficiency.  · Trace mitral regurgitation, trace tricuspid regurgitation, normal RVSP.      I have reviewed the medications:  Scheduled Meds:aspirin, 81 mg, Oral, Daily   Or  aspirin, 300 mg, Rectal, Daily  atorvastatin, 10 mg, Oral, Nightly  heparin (porcine), 5,000 Units, Subcutaneous, Q8H  levETIRAcetam, 2,000 mg, Oral, Q12H  levothyroxine, 25 mcg, Oral, Q AM  palliative care oral rinse, , Mouth/Throat, TID - RT  phenytoin, 125 mg, Oral, TID  senna-docusate sodium, 2 tablet, Oral, BID      Continuous Infusions:   PRN Meds:.•  acetaminophen  •  senna-docusate sodium **AND** polyethylene glycol **AND** bisacodyl **AND** bisacodyl    Assessment/Plan   Assessment & Plan     Active Hospital Problems    Diagnosis  POA   • Pneumonia of both lower lobes due to infectious organism [J18.9]  Yes   • Chronic diastolic CHF (congestive heart failure) (HCC) [I50.32]  Unknown   • Hyperlipidemia [E78.5]  Yes   • Mental disorder [F99]  Yes   • History of seizure disorder [Z86.69]  Not Applicable   • Cardiomegaly [I51.7]  Yes      Resolved Hospital Problems   No resolved problems to display.        Brief Hospital Course to date:  Rao Alcazar is a 76 y.o. male patient with severe intellectual disability, nonverbal at baseline (guardian of state), with history of dyslipidemia, seizure disorder and chronic hypoxic respiratory failure on home oxygen at 2 L/min presented to the hospital with fever and cough and was found to have bilateral basal pneumonia. This is my first day assessing patient's active medical issues.     This patient's problems and plans were partially entered by my partner and updated as appropriate by me 10/09/21.    Assessment/plan:    C  -My partner prior had d/w patient's family. This patient suffers from chronic conditions such as seizure disorder, cognitive delay and heart failure. DNR/DNI is appropriate as this would only  "prolong suffering and would most likely not be successful -guardian in agreement - changed to DNR/DNI.  -Most currently this patient has been diagnosed with severe dysphagia. He has been receiving nutrition via an NG tube which is temporary but he has pulled at this tube and continues trying to push it out with his mouth. His sitter, whom knows him well, states that he \"lives to eat\". A comfort diet along with comfort measures would be most appropriate as a PEG placement and any further aggressive treatment would only prolong suffering. He is progressive in his chronic conditions with no improvement foreseen.      Aspiration pneumonia  Dysphagia   -CT chest showed bilateral pulmonary infiltrates and pleural effusion, concerning for community acquired pneumonia  -Has completed course of IV abx.  -Continue comfort diet.  -CM/SW following for disposition, looking for LTC facility     Seizure disorder  -Keppra and Dilantin  -Seizure precautions, stable     Elevated alk phos, trending down    Diastolic heart failure, not in decompensation  -echo EF 60%, aortic stenosis, normal RVSP   -Compensated. Continue aspirin     Hyperlipidemia  -Continue statin     Severe intellectual disability   Nonverbal   --Stable to baseline    DVT prophylaxis:  Medical DVT prophylaxis orders are present.       AM-PAC 6 Clicks Score (PT): 6 (10/08/21 0900)    Disposition: I expect the patient to be discharged TBD.  Pending CM finding a facility for long term care and hospice    CODE STATUS:   Code Status and Medical Interventions:   Ordered at: 10/05/21 7340     Code Status:    No CPR     Medical Interventions (Level of Support Prior to Arrest):    Comfort Measures       Jing Tran, APRN  10/09/21            "

## 2021-10-09 NOTE — NURSING NOTE
RN contacted [facility] regarding new open wound areas to patients bilateral gluteals.     Ordered Venelex ointment BID.   See orders for care needs.     On a specialty mattress.   Will follow-up tomorrow AM for assessment.     Thanks

## 2021-10-10 PROCEDURE — 25010000002 HEPARIN (PORCINE) PER 1000 UNITS: Performed by: NURSE PRACTITIONER

## 2021-10-10 PROCEDURE — 99232 SBSQ HOSP IP/OBS MODERATE 35: CPT | Performed by: NURSE PRACTITIONER

## 2021-10-10 RX ADMIN — PHENYTOIN 125 MG: 125 SUSPENSION ORAL at 17:22

## 2021-10-10 RX ADMIN — PHENYTOIN 125 MG: 125 SUSPENSION ORAL at 09:22

## 2021-10-10 RX ADMIN — MINERAL OIL: 1000 LIQUID ORAL at 13:10

## 2021-10-10 RX ADMIN — PHENYTOIN 125 MG: 125 SUSPENSION ORAL at 21:32

## 2021-10-10 RX ADMIN — HEPARIN SODIUM 5000 UNITS: 5000 INJECTION, SOLUTION INTRAVENOUS; SUBCUTANEOUS at 21:29

## 2021-10-10 RX ADMIN — HEPARIN SODIUM 5000 UNITS: 5000 INJECTION, SOLUTION INTRAVENOUS; SUBCUTANEOUS at 05:29

## 2021-10-10 RX ADMIN — LEVETIRACETAM 2000 MG: 100 SOLUTION ORAL at 09:22

## 2021-10-10 RX ADMIN — ASPIRIN 81 MG CHEWABLE TABLET 81 MG: 81 TABLET CHEWABLE at 09:22

## 2021-10-10 RX ADMIN — LEVOTHYROXINE SODIUM 25 MCG: 25 TABLET ORAL at 05:29

## 2021-10-10 RX ADMIN — HEPARIN SODIUM 5000 UNITS: 5000 INJECTION, SOLUTION INTRAVENOUS; SUBCUTANEOUS at 13:10

## 2021-10-10 RX ADMIN — ATORVASTATIN CALCIUM 10 MG: 10 TABLET, FILM COATED ORAL at 21:26

## 2021-10-10 RX ADMIN — DOCUSATE SODIUM 50 MG AND SENNOSIDES 8.6 MG 2 TABLET: 8.6; 5 TABLET, FILM COATED ORAL at 21:26

## 2021-10-10 RX ADMIN — CASTOR OIL AND BALSAM, PERU 1 APPLICATION: 788; 87 OINTMENT TOPICAL at 21:26

## 2021-10-10 RX ADMIN — LEVETIRACETAM 2000 MG: 100 SOLUTION ORAL at 21:34

## 2021-10-10 RX ADMIN — MINERAL OIL: 1000 LIQUID ORAL at 21:39

## 2021-10-10 RX ADMIN — CASTOR OIL AND BALSAM, PERU 1 APPLICATION: 788; 87 OINTMENT TOPICAL at 09:22

## 2021-10-10 RX ADMIN — MINERAL OIL: 1000 LIQUID ORAL at 09:23

## 2021-10-10 NOTE — NURSING NOTE
WOC follow-up:     Gluteal skin breakdown       At this time patients bottom presents with mild MASD and friction.   Hypopigmentation form previously resolved wound areas.     Continue with current POC:   -Venelex ointment BID    On a DAVIE specialty mattress.     WOC will continue to follow.   Contact if needs arise.     Thanks

## 2021-10-10 NOTE — PLAN OF CARE
Goal Outcome Evaluation:  Plan of Care Reviewed With: family, caregiver        Progress: no change   Pt oriented to self. Continuously asking for his pudding. Non-tele. Resp unlabored on RA. Upon AM assessment and bath open areas bilateral glutes with small amount of bloody drainage noted on chucks. Called WOC right away for suggestions to properly treat. Venelex ordered and applied as ordered. Wound care completed as ordered. Pt turned Q 2 hours, heel boots on throughout shift. Pt denies pain. VSS.

## 2021-10-10 NOTE — PROGRESS NOTES
[facility] Medicine Services  PROGRESS NOTE    Patient Name: Rao Alcazar  : 1945  MRN: [id number]    Date of Admission: 2021  Primary Care Physician: Provider, No Known    Subjective   Subjective     CC: f/u dysphagia    HPI:   Asking for pudding and bubble gum. NAD.     ROS:  UTO due to mental disability     Objective   Objective     Vital Signs:   Temp:  [98.4 °F (36.9 °C)-98.5 °F (36.9 °C)] 98.4 °F (36.9 °C)  Heart Rate:  [72-84] 84  BP: (104-126)/(67-83) 104/83  Flow (L/min):  [2] 2     Physical Exam:  Constitutional: No acute distress, awake, alert  HENT: NCAT, mucous membranes moist  Respiratory: Clear to auscultation bilaterally, respiratory effort normal   Cardiovascular: RRR, no murmurs, rubs, or gallops  Gastrointestinal: Positive bowel sounds, soft, nontender, nondistended  Musculoskeletal: No bilateral ankle edema  Psychiatric: Flat affect, cooperative  Neurologic: CRABTREE, not following commands, speech midly difficult to understand   Skin: No rashes       Results Reviewed:  LAB RESULTS:      Lab 10/04/21  0653   WBC 4.28   HEMOGLOBIN 14.9   HEMATOCRIT 44.1   PLATELETS 227   NEUTROS ABS 2.11   IMMATURE GRANS (ABS) 0.02   LYMPHS ABS 1.28   MONOS ABS 0.50   EOS ABS 0.34   MCV 93.0         Lab 10/04/21  0653   SODIUM 134*   POTASSIUM 3.8   CHLORIDE 99   CO2 27.0   ANION GAP 8.0   BUN 4*   CREATININE 0.42*   GLUCOSE 101*   CALCIUM 8.8   MAGNESIUM 1.9   PHOSPHORUS 3.0         Lab 10/04/21  0653   TOTAL PROTEIN 7.6   ALBUMIN 3.30*   ALT (SGPT) 30   AST (SGOT) 32   BILIRUBIN 0.4   ALK PHOS 134*             Lab 10/04/21  0653   CHOLESTEROL 177   TRIGLYCERIDES 136             Brief Urine Lab Results  (Last result in the past 365 days)      Color   Clarity   Blood   Leuk Est   Nitrite   Protein   CREAT   Urine HCG        21 1203 Yellow   Clear   Negative   Negative   Negative   Negative                 Microbiology Results Abnormal     Procedure Component Value -  Date/Time    Blood Culture - Blood, Arm, Right [[id number]] Collected: 09/24/21 1150    Lab Status: Final result Specimen: Blood from Arm, Right Updated: 09/29/21 1231     Blood Culture No growth at 5 days    Blood Culture - Blood, Arm, Left [[id number]] Collected: 09/24/21 1150    Lab Status: Final result Specimen: Blood from Arm, Left Updated: 09/29/21 1231     Blood Culture No growth at 5 days    COVID PRE-OP / PRE-PROCEDURE SCREENING ORDER (NO ISOLATION) - Swab, Nasopharynx [[id number]]  (Normal) Collected: 09/24/21 1208    Lab Status: Final result Specimen: Swab from Nasopharynx Updated: 09/24/21 1236    Narrative:      The following orders were created for panel order COVID PRE-OP / PRE-PROCEDURE SCREENING ORDER (NO ISOLATION) - Swab, Nasopharynx.  Procedure                               Abnormality         Status                     ---------                               -----------         ------                     COVID-19 and FLU A/B PCR...[[id number]]  Normal              Final result                 Please view results for these tests on the individual orders.    COVID-19 and FLU A/B PCR - Swab, Nasopharynx [[id number]]  (Normal) Collected: 09/24/21 1208    Lab Status: Final result Specimen: Swab from Nasopharynx Updated: 09/24/21 1236     COVID19 Not Detected     Influenza A PCR Not Detected     Influenza B PCR Not Detected    Narrative:      Fact sheet for providers: https://www.fda.gov/media/652764/download    Fact sheet for patients: https://www.fda.gov/media/254281/download    Test performed by PCR.        CXR personally reviewed showing bilateral opacities. Agree with interpretation.    Results for orders placed during the hospital encounter of 09/24/21    Adult Transthoracic Echo Complete W/ Cont if Necessary Per Protocol    Interpretation Summary  · Normal left ventricular systolic function, estimated EF 60%.  · Aortic sclerosis without aortic stenosis.  · Trace aortic insufficiency.  · Trace mitral  regurgitation, trace tricuspid regurgitation, normal RVSP.      I have reviewed the medications:  Scheduled Meds:aspirin, 81 mg, Oral, Daily   Or  aspirin, 300 mg, Rectal, Daily  atorvastatin, 10 mg, Oral, Nightly  castor oil-balsam peru, 1 application, Topical, Q12H  heparin (porcine), 5,000 Units, Subcutaneous, Q8H  levETIRAcetam, 2,000 mg, Oral, Q12H  levothyroxine, 25 mcg, Oral, Q AM  palliative care oral rinse, , Mouth/Throat, TID - RT  phenytoin, 125 mg, Oral, TID  senna-docusate sodium, 2 tablet, Oral, BID      Continuous Infusions:   PRN Meds:.•  acetaminophen  •  senna-docusate sodium **AND** polyethylene glycol **AND** bisacodyl **AND** bisacodyl    Assessment/Plan   Assessment & Plan     Active Hospital Problems    Diagnosis  POA   • Pneumonia of both lower lobes due to infectious organism [J18.9]  Yes   • Chronic diastolic CHF (congestive heart failure) ([facility]) [I50.32]  Unknown   • Hyperlipidemia [E78.5]  Yes   • Mental disorder [F99]  Yes   • History of seizure disorder [Z86.69]  Not Applicable   • Cardiomegaly [I51.7]  Yes      Resolved Hospital Problems   No resolved problems to display.        Brief Hospital Course to date:  Rao Alcazar is a 76 y.o. male patient with severe intellectual disability, nonverbal at baseline (guardian of state), with history of dyslipidemia, seizure disorder and chronic hypoxic respiratory failure on home oxygen at 2 L/min presented to the hospital with fever and cough and was found to have bilateral basal pneumonia. This is my first day assessing patient's active medical issues.     This patient's problems and plans were partially entered by my partner and updated as appropriate by me 10/10/21.    Assessment/plan:    GOC  -My partner prior had d/w patient's family. This patient suffers from chronic conditions such as seizure disorder, cognitive delay and heart failure. DNR/DNI is appropriate as this would only prolong suffering and would most likely not be successful  "-guardian in agreement - changed to DNR/DNI.  -Most currently this patient has been diagnosed with severe dysphagia. He has been receiving nutrition via an NG tube which is temporary but he has pulled at this tube and continues trying to push it out with his mouth. His sitter, whom knows him well, states that he \"lives to eat\". A comfort diet along with comfort measures would be most appropriate as a PEG placement and any further aggressive treatment would only prolong suffering. He is progressive in his chronic conditions with no improvement foreseen.      Aspiration pneumonia  Dysphagia   -CT chest showed bilateral pulmonary infiltrates and pleural effusion, concerning for community acquired pneumonia  -Has completed course of IV abx.  -Continue comfort diet.  -CM/SW following for disposition, looking for LTC facility  --labs in AM      Seizure disorder  -Keppra and Dilantin  -Seizure precautions, stable     Elevated alk phos, trending down    Diastolic heart failure, not in decompensation  -echo EF 60%, aortic stenosis, normal RVSP   -Compensated. Continue aspirin     Hyperlipidemia  -Continue statin     Severe intellectual disability   Nonverbal   --Stable to baseline    DVT prophylaxis:  Medical DVT prophylaxis orders are present.       AM-PAC 6 Clicks Score (PT): 6 (10/09/21 3719)    Disposition: I expect the patient to be discharged TBD.  Pending CM finding a facility for long term care and hospice    CODE STATUS:   Code Status and Medical Interventions:   Ordered at: 10/05/21 8812     Code Status:    No CPR     Medical Interventions (Level of Support Prior to Arrest):    Comfort Measures       Jing Tran, APRN  10/10/21            "

## 2021-10-11 LAB
ALBUMIN SERPL-MCNC: 3.7 G/DL (ref 3.5–5.2)
ALP SERPL-CCNC: 143 U/L (ref 39–117)
ALT SERPL W P-5'-P-CCNC: 26 U/L (ref 1–41)
ANION GAP SERPL CALCULATED.3IONS-SCNC: 9 MMOL/L (ref 5–15)
AST SERPL-CCNC: 26 U/L (ref 1–40)
BILIRUB SERPL-MCNC: 0.2 MG/DL (ref 0–1.2)
BUN SERPL-MCNC: 7 MG/DL (ref 8–23)
CALCIUM SPEC-SCNC: 9.1 MG/DL (ref 8.6–10.5)
CHLORIDE SERPL-SCNC: 100 MMOL/L (ref 98–107)
CHOLEST SERPL-MCNC: 148 MG/DL (ref 0–200)
CO2 SERPL-SCNC: 27 MMOL/L (ref 22–29)
CREAT SERPL-MCNC: 0.44 MG/DL (ref 0.76–1.27)
GLUCOSE SERPL-MCNC: 100 MG/DL (ref 65–99)
MAGNESIUM SERPL-MCNC: 1.9 MG/DL (ref 1.6–2.4)
PHOSPHATE SERPL-MCNC: 2.7 MG/DL (ref 2.5–4.5)
POTASSIUM SERPL-SCNC: 4.2 MMOL/L (ref 3.5–5.2)
PROT SERPL-MCNC: 7.9 G/DL (ref 6–8.5)
SODIUM SERPL-SCNC: 136 MMOL/L (ref 136–145)
TRIGL SERPL-MCNC: 177 MG/DL (ref 0–150)

## 2021-10-11 PROCEDURE — 84100 ASSAY OF PHOSPHORUS: CPT

## 2021-10-11 PROCEDURE — 99232 SBSQ HOSP IP/OBS MODERATE 35: CPT | Performed by: NURSE PRACTITIONER

## 2021-10-11 PROCEDURE — 84478 ASSAY OF TRIGLYCERIDES: CPT

## 2021-10-11 PROCEDURE — 83735 ASSAY OF MAGNESIUM: CPT

## 2021-10-11 PROCEDURE — 82465 ASSAY BLD/SERUM CHOLESTEROL: CPT

## 2021-10-11 PROCEDURE — 80053 COMPREHEN METABOLIC PANEL: CPT

## 2021-10-11 PROCEDURE — 25010000002 HEPARIN (PORCINE) PER 1000 UNITS: Performed by: NURSE PRACTITIONER

## 2021-10-11 RX ADMIN — LEVETIRACETAM 2000 MG: 100 SOLUTION ORAL at 09:09

## 2021-10-11 RX ADMIN — HEPARIN SODIUM 5000 UNITS: 5000 INJECTION, SOLUTION INTRAVENOUS; SUBCUTANEOUS at 20:55

## 2021-10-11 RX ADMIN — HEPARIN SODIUM 5000 UNITS: 5000 INJECTION, SOLUTION INTRAVENOUS; SUBCUTANEOUS at 05:52

## 2021-10-11 RX ADMIN — PHENYTOIN 125 MG: 125 SUSPENSION ORAL at 09:08

## 2021-10-11 RX ADMIN — HEPARIN SODIUM 5000 UNITS: 5000 INJECTION, SOLUTION INTRAVENOUS; SUBCUTANEOUS at 14:27

## 2021-10-11 RX ADMIN — CASTOR OIL AND BALSAM, PERU 1 APPLICATION: 788; 87 OINTMENT TOPICAL at 09:08

## 2021-10-11 RX ADMIN — ATORVASTATIN CALCIUM 10 MG: 10 TABLET, FILM COATED ORAL at 20:50

## 2021-10-11 RX ADMIN — LEVETIRACETAM 2000 MG: 100 SOLUTION ORAL at 20:51

## 2021-10-11 RX ADMIN — PHENYTOIN 125 MG: 125 SUSPENSION ORAL at 20:51

## 2021-10-11 RX ADMIN — LEVOTHYROXINE SODIUM 25 MCG: 25 TABLET ORAL at 05:53

## 2021-10-11 RX ADMIN — DOCUSATE SODIUM 50 MG AND SENNOSIDES 8.6 MG 2 TABLET: 8.6; 5 TABLET, FILM COATED ORAL at 20:50

## 2021-10-11 RX ADMIN — CASTOR OIL AND BALSAM, PERU 1 APPLICATION: 788; 87 OINTMENT TOPICAL at 20:55

## 2021-10-11 RX ADMIN — MINERAL OIL: 1000 LIQUID ORAL at 09:09

## 2021-10-11 RX ADMIN — PHENYTOIN 125 MG: 125 SUSPENSION ORAL at 17:01

## 2021-10-11 RX ADMIN — MINERAL OIL: 1000 LIQUID ORAL at 14:28

## 2021-10-11 RX ADMIN — ASPIRIN 81 MG CHEWABLE TABLET 81 MG: 81 TABLET CHEWABLE at 09:08

## 2021-10-11 RX ADMIN — MINERAL OIL: 1000 LIQUID ORAL at 20:56

## 2021-10-11 NOTE — CASE MANAGEMENT/SOCIAL WORK
Continued Stay Note  [facility]     Patient Name: Rao Alcazar  MRN: [id number]  Today's Date: 10/11/2021    Admit Date: 9/24/2021     Discharge Plan     Row Name 10/11/21 1618       Plan    Plan ongoing    Plan Comments Pt  remains a patiet at this time and is still without bed offers.  SW will continue to follow.  If no bed offers are received, facility search will need to be expanded.  CHELSEY Morales @ [phone]    Final Discharge Disposition Code 30 - still a patient               Discharge Codes    No documentation.               Expected Discharge Date and Time     Expected Discharge Date Expected Discharge Time    Oct 15, 2021             CHELSEY Vaughn

## 2021-10-11 NOTE — PLAN OF CARE
Patient awaiting placement.  Continue comfort measures.    1300 Palliative IDT - Palliative Team members present:  QUITA Vázquez DO; CONNIE Shafer APRN; EDWARDO Bridges RN, CHPN; NEHEMIAH Otto RN, CHPN; JUNIOR LOPEZW, [facility]-SW; SHEYLA Landa MDiv; Hospice  CELIA George RN, CHPN; Hospice SW SAY Alcazar W.

## 2021-10-11 NOTE — PROGRESS NOTES
Continued Stay Note  [facility]     Patient Name: Rao Alcazar  MRN: [id number]  Today's Date: 10/11/2021    Admit Date: 9/24/2021     Discharge Plan     Row Name 10/11/21 1121       Plan    Plan Long term care placement    Plan Comments Visit made to pt, pt with eyes closed, appeared to be sleeping. Did not arouse pt.  continues to seek placement for pt. Will continue to follow. Please call [phone] if can be of further assistance.               Discharge Codes    No documentation.               Expected Discharge Date and Time     Expected Discharge Date Expected Discharge Time    Oct 1, 2021             Susan George RN

## 2021-10-11 NOTE — PROGRESS NOTES
[facility] Medicine Services  PROGRESS NOTE    Patient Name: Rao Alcazar  : 1945  MRN: [id number]    Date of Admission: 2021  Primary Care Physician: Provider, No Known    Subjective   Subjective   CC: f/u dysphagia    HPI:   Patient very difficult to understand, but pleasant.  Follows simple waiting placement in NAD.    ROS:  UTO due to mental disability     Objective   Objective   Vital Signs:   Temp:  [97.7 °F (36.5 °C)] 97.7 °F (36.5 °C)  Heart Rate:  [69-82] 78  Resp:  [20] 20  BP: (132)/(65) 132/65  Flow (L/min):  [2] 2  Physical Exam:  Constitutional: Alert, thin ill-appearing male sitting up in bed in NAD  Eyes: EOMI, sclerae anicteric, no conjunctival injection  Head: NCAT  ENT: Circle, moist mucous membranes   Respiratory: Nonlabored, symmetrical chest expansion, CTAB  Cardiovascular: RRR, HR 72,, 100% RA no M/R/G, +DP pulses bilaterally  Gastrointestinal: Soft, NT, ND +BS  Musculoskeletal: CRABTREE; no LE edema bilaterally  Neurologic: Alert but unable to determine orientation, strength symmetric in all extremities, does not follow commands, speech difficult to underestand  Skin: No rashes on exposed skin  Psychiatric: Pleasant and cooperative; flat affect    Results Reviewed:  LAB RESULTS:          Lab 10/11/21  0521   SODIUM 136   POTASSIUM 4.2   CHLORIDE 100   CO2 27.0   ANION GAP 9.0   BUN 7*   CREATININE 0.44*   GLUCOSE 100*   CALCIUM 9.1   MAGNESIUM 1.9   PHOSPHORUS 2.7         Lab 10/11/21  0521   TOTAL PROTEIN 7.9   ALBUMIN 3.70   ALT (SGPT) 26   AST (SGOT) 26   BILIRUBIN 0.2   ALK PHOS 143*             Lab 10/11/21  0521   CHOLESTEROL 148   TRIGLYCERIDES 177*             Brief Urine Lab Results  (Last result in the past 365 days)      Color   Clarity   Blood   Leuk Est   Nitrite   Protein   CREAT   Urine HCG        21 1203 Yellow   Clear   Negative   Negative   Negative   Negative                 Microbiology Results Abnormal     Procedure Component Value  - Date/Time    Blood Culture - Blood, Arm, Right [[id number]] Collected: 09/24/21 1150    Lab Status: Final result Specimen: Blood from Arm, Right Updated: 09/29/21 1231     Blood Culture No growth at 5 days    Blood Culture - Blood, Arm, Left [[id number]] Collected: 09/24/21 1150    Lab Status: Final result Specimen: Blood from Arm, Left Updated: 09/29/21 1231     Blood Culture No growth at 5 days    COVID PRE-OP / PRE-PROCEDURE SCREENING ORDER (NO ISOLATION) - Swab, Nasopharynx [[id number]]  (Normal) Collected: 09/24/21 1208    Lab Status: Final result Specimen: Swab from Nasopharynx Updated: 09/24/21 1236    Narrative:      The following orders were created for panel order COVID PRE-OP / PRE-PROCEDURE SCREENING ORDER (NO ISOLATION) - Swab, Nasopharynx.  Procedure                               Abnormality         Status                     ---------                               -----------         ------                     COVID-19 and FLU A/B PCR...[[id number]]  Normal              Final result                 Please view results for these tests on the individual orders.    COVID-19 and FLU A/B PCR - Swab, Nasopharynx [[id number]]  (Normal) Collected: 09/24/21 1208    Lab Status: Final result Specimen: Swab from Nasopharynx Updated: 09/24/21 1236     COVID19 Not Detected     Influenza A PCR Not Detected     Influenza B PCR Not Detected    Narrative:      Fact sheet for providers: https://www.fda.gov/media/280186/download    Fact sheet for patients: https://www.fda.gov/media/596709/download    Test performed by PCR.        CXR personally reviewed showing bilateral opacities. Agree with interpretation.    Results for orders placed during the hospital encounter of 09/24/21    Adult Transthoracic Echo Complete W/ Cont if Necessary Per Protocol    Interpretation Summary  · Normal left ventricular systolic function, estimated EF 60%.  · Aortic sclerosis without aortic stenosis.  · Trace aortic insufficiency.  · Trace  mitral regurgitation, trace tricuspid regurgitation, normal RVSP.      I have reviewed the medications:  Scheduled Meds:aspirin, 81 mg, Oral, Daily   Or  aspirin, 300 mg, Rectal, Daily  atorvastatin, 10 mg, Oral, Nightly  castor oil-balsam peru, 1 application, Topical, Q12H  heparin (porcine), 5,000 Units, Subcutaneous, Q8H  levETIRAcetam, 2,000 mg, Oral, Q12H  levothyroxine, 25 mcg, Oral, Q AM  palliative care oral rinse, , Mouth/Throat, TID - RT  phenytoin, 125 mg, Oral, TID  senna-docusate sodium, 2 tablet, Oral, BID      Continuous Infusions:   PRN Meds:.•  acetaminophen  •  senna-docusate sodium **AND** polyethylene glycol **AND** bisacodyl **AND** bisacodyl    Assessment/Plan   Assessment & Plan     Active Hospital Problems    Diagnosis  POA   • Pneumonia of both lower lobes due to infectious organism [J18.9]  Yes   • Chronic diastolic CHF (congestive heart failure) ([facility]) [I50.32]  Unknown   • Hyperlipidemia [E78.5]  Yes   • Mental disorder [F99]  Yes   • History of seizure disorder [Z86.69]  Not Applicable   • Cardiomegaly [I51.7]  Yes      Resolved Hospital Problems   No resolved problems to display.     Brief Hospital Course to date:  Rao Alcazar is a 76 y.o. male patient with severe intellectual disability, nonverbal at baseline (guardian of state), with history of dyslipidemia, seizure disorder and chronic hypoxic respiratory failure on home oxygen at 2 L/min presented to the hospital with fever and cough and was found to have bilateral basal pneumonia. This is my first day assessing patient's active medical issues.     These problems are new to me today    This patient's problems and plans were partially entered by my partner and updated as appropriate by me 10/11/21.    GO  -My partner prior had d/w patient's family. This patient suffers from chronic conditions such as seizure disorder, cognitive delay and heart failure. DNR/DNI is appropriate as this would only prolong suffering and would most  "likely not be successful -guardian in agreement - changed to DNR/DNI.  -Most currently this patient has been diagnosed with severe dysphagia. He has been receiving nutrition via an NG tube which is temporary but he has pulled at this tube and continues trying to push it out with his mouth. His sitter, whom knows him well, states that he \"lives to eat\". A comfort diet along with comfort measures would be most appropriate as a PEG placement and any further aggressive treatment would only prolong suffering. He is progressive in his chronic conditions with no improvement foreseen.      Aspiration pneumonia  Dysphagia   -CT chest showed bilateral pulmonary infiltrates and pleural effusion, concerning for community acquired pneumonia  -Has completed course of IV abx.  -Continue comfort diet.  -CM/SW following for disposition, looking for LTC facility  --AM labs unremarkable     Seizure disorder  -Keppra and Dilantin  -Seizure precautions, stable     Elevated alk phos, trending down    Diastolic heart failure, not in decompensation  -echo EF 60%, aortic stenosis, normal RVSP   -Compensated. Continue aspirin     Hyperlipidemia  -Continue statin     Severe intellectual disability   Nonverbal   --Stable to baseline    DVT prophylaxis:  Medical DVT prophylaxis orders are present.       AM-PAC 6 Clicks Score (PT): 6 (10/11/21 0900)    Disposition: I expect the patient to be discharged TBD.  Pending CM finding a facility for long term care and hospice    CODE STATUS:   Code Status and Medical Interventions:   Ordered at: 10/05/21 5240     Code Status:    No CPR     Medical Interventions (Level of Support Prior to Arrest):    Comfort Measures       Payton Stallings, MNOIKA  10/11/21            "

## 2021-10-12 PROCEDURE — 99231 SBSQ HOSP IP/OBS SF/LOW 25: CPT | Performed by: NURSE PRACTITIONER

## 2021-10-12 PROCEDURE — 25010000002 HEPARIN (PORCINE) PER 1000 UNITS: Performed by: NURSE PRACTITIONER

## 2021-10-12 RX ADMIN — ASPIRIN 81 MG CHEWABLE TABLET 81 MG: 81 TABLET CHEWABLE at 09:00

## 2021-10-12 RX ADMIN — MINERAL OIL: 1000 LIQUID ORAL at 21:30

## 2021-10-12 RX ADMIN — LEVETIRACETAM 2000 MG: 100 SOLUTION ORAL at 09:01

## 2021-10-12 RX ADMIN — HEPARIN SODIUM 5000 UNITS: 5000 INJECTION, SOLUTION INTRAVENOUS; SUBCUTANEOUS at 05:44

## 2021-10-12 RX ADMIN — HEPARIN SODIUM 5000 UNITS: 5000 INJECTION, SOLUTION INTRAVENOUS; SUBCUTANEOUS at 14:23

## 2021-10-12 RX ADMIN — PHENYTOIN 125 MG: 125 SUSPENSION ORAL at 09:00

## 2021-10-12 RX ADMIN — MINERAL OIL: 1000 LIQUID ORAL at 09:01

## 2021-10-12 RX ADMIN — DOCUSATE SODIUM 50 MG AND SENNOSIDES 8.6 MG 2 TABLET: 8.6; 5 TABLET, FILM COATED ORAL at 21:58

## 2021-10-12 RX ADMIN — CASTOR OIL AND BALSAM, PERU 1 APPLICATION: 788; 87 OINTMENT TOPICAL at 21:58

## 2021-10-12 RX ADMIN — LEVETIRACETAM 2000 MG: 100 SOLUTION ORAL at 21:58

## 2021-10-12 RX ADMIN — HEPARIN SODIUM 5000 UNITS: 5000 INJECTION, SOLUTION INTRAVENOUS; SUBCUTANEOUS at 21:58

## 2021-10-12 RX ADMIN — LEVOTHYROXINE SODIUM 25 MCG: 25 TABLET ORAL at 05:44

## 2021-10-12 RX ADMIN — PHENYTOIN 125 MG: 125 SUSPENSION ORAL at 17:11

## 2021-10-12 RX ADMIN — CASTOR OIL AND BALSAM, PERU 1 APPLICATION: 788; 87 OINTMENT TOPICAL at 09:01

## 2021-10-12 RX ADMIN — ATORVASTATIN CALCIUM 10 MG: 10 TABLET, FILM COATED ORAL at 21:58

## 2021-10-12 RX ADMIN — PHENYTOIN 125 MG: 125 SUSPENSION ORAL at 21:57

## 2021-10-12 RX ADMIN — MINERAL OIL: 1000 LIQUID ORAL at 14:24

## 2021-10-12 NOTE — PLAN OF CARE
Goal Outcome Evaluation:           Progress: no change     No complaints on shift. Pt resting off and on during shift. Tolerated PO feedings without s/s of aspiration. Pt unable to speak coherent words. Pills whole with applesauce without difficulties. Awaiting placement.

## 2021-10-12 NOTE — PLAN OF CARE
Goal Outcome Evaluation:  Plan of Care Reviewed With: other (see comments) (pt sleeping, no family present)        Progress: no change  Outcome Summary: Pt sleeping at time of Palliative RN encounter. Per documentation, no symptom management or GOC needs; awaiting placement.    1300 Palliative Team Conference: EDWARDO Bridges RN, PN; SHEYLA Carrillo[facility]; QUITA Vázquez, DO; NEHEMIAH Otto RN, MATTIE; CONNIE Shafer, APRN; CELIA George, GILMAR, MATTIE; SAY Alcazar, W; NEHEMIAH England, APRN; MARINA Patricia RN    Problem: Palliative Care  Goal: Enhanced Quality of Life  Outcome: Ongoing, Progressing  Intervention: Maximize Comfort  Flowsheets (Taken 10/12/2021 1503)  Pain Management Interventions: (no observable signs of discomfort) other (see comments)

## 2021-10-12 NOTE — DISCHARGE PLACEMENT REQUEST
"Rao Reyes (76 y.o. Male)     Pt has state Guardian Frantz Rao [phone]. Looking for longterm with Hospice.  Grecia Lebron [phone]            Date of Birth Social Security Number Address Home Phone MRN    1945  [address]  [facility] [phone] [id number]    Nondenominational Marital Status             Non-Yarsanism Single       Admission Date Admission Type Admitting Provider Attending Provider Department, Room/Bed    9/24/21 Emergency Elisabet Bazzi DO [place], Olivia D, DO [address], [address]    Discharge Date Discharge Disposition Discharge Destination                         Attending Provider: Elisabet Bazzi DO    Allergies: No Known Allergies    Isolation: None   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)   Code Status: No CPR   Advance Care Planning Activity    Ht: 180 cm (70.87\")   Wt: 83.9 kg (185 lb)    Admission Cmt: None   Principal Problem: None                Active Insurance as of 9/24/2021     Primary Coverage     Payor Plan Insurance Group Employer/Plan Group    MEDICARE MEDICARE A & B      Payor Plan Address Payor Plan Phone Number Payor Plan Fax Number Effective Dates    [address] [phone]  6/1/2011 - None Entered    [facility] [id number]       Subscriber Name Subscriber Birth Date Member ID       RAO REYES 1945 [id number]           Secondary Coverage     Payor Plan Insurance Group Employer/Plan Group    [place]      Payor Plan Address Payor Plan Phone Number Payor Plan Fax Number Effective Dates    [address] [phone]  9/28/2017 - None Entered    [place] [address]       Subscriber Name Subscriber Birth Date Member ID       RAO REYES 1945 [id number]                 Emergency Contacts      (Rel.) Home Phone Work Phone Mobile Phone    FRANTZ RAO (Guardian) -- [phone]               History & Physical      Johnathan Sanchez MD at 09/24/21 1700     "          [facility] Medicine Services  HISTORY AND PHYSICAL    Patient Name: Rao Alcazar  : 1945  MRN: [id number]  Primary Care Physician: Provider, No Known  Date of admission: 2021    Subjective   Subjective     Chief Complaint:  Cough and fever    HPI:  Rao Alcazar is a 76 y.o. 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  Patient is not able to provide any history, history is obtained from caregiver at bedside.  Caregiver indicates that the patient began having more difficulty breathing with productive cough and fever overnight.  T-max of 101.5.  Patient additionally noted to be more weak than normal.  Patient has had several aspiration pneumonias in the past and the caregiver was concerned that this is a possibility again so chose to bring him to the ER.  Labs with elevated WBC 18, pro-Octavio 0.41.  Chest x-ray showed enlarged heart with increased pulmonary vascularity bilaterally but no acute parenchymal disease.  CT chest shows patchy groundglass opacity in the perihilar regions bilaterally concerning for infiltrate.  Patient will be admitted to hospital medicine for further evaluation and treatment.    COVID Details:        Symptoms: [] NONE [x] Fever [x]  Cough [] Shortness of breath [] Change in taste or smell  The patient has a COVID HM Topic on their chart, and they are fully vaccinated.    Review of Systems   Unable to obtain due to nonverbal  All other systems reviewed and are negative.     Personal History     Past Medical History:   Diagnosis Date   • Atypical chest pain    • Cardiomegaly    • CHF (congestive heart failure) (CMS/HCC)    • History of panic attacks    • History of seizure disorder    • Mental disorder    • Mental retardation    • Panic attacks    •  Seizures (CMS/HCC)        Past Surgical History:   Procedure Laterality Date   • EXPLORATORY LAPAROTOMY         Family History:  Family history is unknown by patient. Otherwise pertinent FHx was reviewed and unremarkable.     Social History:  reports that he has never smoked. He has never used smokeless tobacco. He reports that he does not drink alcohol and does not use drugs.  Social History     Social History Narrative   • Not on file       Medications:  Calcium Carb-Cholecalciferol, Menthol-Zinc Oxide, Polyethylene Glycol 3350, acetaminophen, aspirin, clonazePAM, hydrophor, levETIRAcetam, levothyroxine, loperamide, mupirocin, phenytoin extended, raNITIdine, sennosides-docusate, simvastatin, and tamsulosin    No Known Allergies    Objective   Objective     Vital Signs:   Temp:  [99.3 °F (37.4 °C)] 99.3 °F (37.4 °C)  Heart Rate:  [68-89] 68  Resp:  [16-22] 18  BP: ()/(53-91) 102/53  Flow (L/min):  [2] 2    Physical Exam   Constitutional: lethargic, chronically ill appearing  Eyes: PERRLA, sclerae anicteric, no conjunctival injection  HENT: NCAT, mucous membranes DRY   Neck: Supple, no thyromegaly, no lymphadenopathy, trachea midline  Respiratory: Bilateral rhonchi with decreased bases, nonlabored respirations on 2lnc   Cardiovascular: RRR, no murmurs, rubs, or gallops, palpable pedal pulses bilaterally  Gastrointestinal: Positive bowel sounds, soft, nontender, nondistended  Musculoskeletal: No bilateral ankle edema, no clubbing or cyanosis to extremities  Psychiatric: ADDIE  Neurologic: CRABTREE spontaneously, doesn't follow commands, nonverbal   Skin: No rashes    Result Review:  I have personally reviewed the results from the time of this admission to 09/24/21 5:00 PM EDT and agree with these findings:  [x]  Laboratory  [x]  Microbiology  [x]  Radiology  [x]  EKG/Telemetry   []  Cardiology/Vascular   []  Pathology  [x]  Old records  []  Other:  Most notable findings include:   Non verbal, very dehydrated  looking    LAB RESULTS:      Lab 09/24/21  1249 09/24/21  1150   WBC  --  18.24*   HEMOGLOBIN  --  15.6   HEMATOCRIT  --  47.2   PLATELETS  --  260   NEUTROS ABS  --  15.15*   IMMATURE GRANS (ABS)  --  0.08*   LYMPHS ABS  --  1.88   MONOS ABS  --  1.09*   EOS ABS  --  0.01   MCV  --  95.4   PROCALCITONIN 0.41*  --    LACTATE  --  1.9         Lab 09/24/21  1249   SODIUM 138   POTASSIUM 4.6   CHLORIDE 99   CO2 29.0   ANION GAP 10.0   BUN 16   CREATININE 0.93   GLUCOSE 162*   CALCIUM 9.0   MAGNESIUM 1.9         Lab 09/24/21  1249   TOTAL PROTEIN 8.0   ALBUMIN 3.70   GLOBULIN 4.3   ALT (SGPT) 7   AST (SGOT) 10   BILIRUBIN 0.3   ALK PHOS 181*         Lab 09/24/21  1249   PROBNP 197.8   TROPONIN T <0.010                 UA    Urinalysis 9/24/21   Specific [place], UA 1.020   Ketones, UA Negative   Blood, UA Negative   Leukocytes, UA Negative   Nitrite, UA Negative           Microbiology Results (last 10 days)     Procedure Component Value - Date/Time    COVID PRE-OP / PRE-PROCEDURE SCREENING ORDER (NO ISOLATION) - Swab, Nasopharynx [[id number]]  (Normal) Collected: 09/24/21 1208    Lab Status: Final result Specimen: Swab from Nasopharynx Updated: 09/24/21 1236    Narrative:      The following orders were created for panel order COVID PRE-OP / PRE-PROCEDURE SCREENING ORDER (NO ISOLATION) - Swab, Nasopharynx.  Procedure                               Abnormality         Status                     ---------                               -----------         ------                     COVID-19 and FLU A/B PCR...[[id number]]  Normal              Final result                 Please view results for these tests on the individual orders.    COVID-19 and FLU A/B PCR - Swab, Nasopharynx [[id number]]  (Normal) Collected: 09/24/21 1208    Lab Status: Final result Specimen: Swab from Nasopharynx Updated: 09/24/21 1236     COVID19 Not Detected     Influenza A PCR Not Detected     Influenza B PCR Not Detected    Narrative:      Fact sheet  for providers: https://www.fda.gov/media/863845/download    Fact sheet for patients: https://www.fda.gov/media/608935/download    Test performed by PCR.          CT Abdomen Pelvis Without Contrast    Result Date: 9/24/2021  EXAMINATION: CT CHEST WO CONTRAST, DIAGNOSTIC-, CT ABDOMEN AND PELVIS WO CONTRAST-09/24/2021:  INDICATION: Sepsis, fever, chest pain.  TECHNIQUE: Multiple axial CT imaging was obtained of the chest, abdomen and pelvis without the administration of intravenous contrast.  The radiation dose reduction device was turned on for each scan per the ALARA (As Low as Reasonably Achievable) protocol.  COMPARISON: NONE.  FINDINGS:  CHEST:  The thyroid is homogeneous. No mediastinal mass or adenopathy. The cardiac chambers are within normal limits.  No pericardial effusion. There is patchy groundglass opacity seen in the perihilar regions bilaterally concerning for infiltrate. There is a tiny left pleural effusion. Degenerative changes seen within the spine.  ABDOMEN: The liver is heterogeneous in appearance. There is calcification seen within the spleen. Old healed granulomatous disease as well seen within the liver. The pancreas is homogeneous. The kidneys and adrenal glands are within normal limits. No abdominal or retroperitoneal lymphadenopathy. No free fluid or free air. There is stool seen scattered diffusely throughout the colon.  PELVIS: The pelvic organs are unremarkable. The pelvic portions of the gastrointestinal tract are within normal limits. No free fluid or free air. No abnormal mass or fluid collection is identified. Degenerative changes seen within the spine and pelvis.      Impression: Increased perihilar markings bilaterally suggesting bilateral infiltrate with small left pleural effusion. The cardiac chambers are enlarged with increased stool seen scattered diffusely throughout the colon. No evidence of obvious obstruction or gross free intraperitoneal air.  D:  09/24/2021 E:  09/24/2021         CT Chest Without Contrast Diagnostic    Result Date: 9/24/2021  EXAMINATION: CT CHEST WO CONTRAST, DIAGNOSTIC-, CT ABDOMEN AND PELVIS WO CONTRAST-09/24/2021:  INDICATION: Sepsis, fever, chest pain.  TECHNIQUE: Multiple axial CT imaging was obtained of the chest, abdomen and pelvis without the administration of intravenous contrast.  The radiation dose reduction device was turned on for each scan per the ALARA (As Low as Reasonably Achievable) protocol.  COMPARISON: NONE.  FINDINGS:  CHEST:  The thyroid is homogeneous. No mediastinal mass or adenopathy. The cardiac chambers are within normal limits.  No pericardial effusion. There is patchy groundglass opacity seen in the perihilar regions bilaterally concerning for infiltrate. There is a tiny left pleural effusion. Degenerative changes seen within the spine.  ABDOMEN: The liver is heterogeneous in appearance. There is calcification seen within the spleen. Old healed granulomatous disease as well seen within the liver. The pancreas is homogeneous. The kidneys and adrenal glands are within normal limits. No abdominal or retroperitoneal lymphadenopathy. No free fluid or free air. There is stool seen scattered diffusely throughout the colon.  PELVIS: The pelvic organs are unremarkable. The pelvic portions of the gastrointestinal tract are within normal limits. No free fluid or free air. No abnormal mass or fluid collection is identified. Degenerative changes seen within the spine and pelvis.      Impression: Increased perihilar markings bilaterally suggesting bilateral infiltrate with small left pleural effusion. The cardiac chambers are enlarged with increased stool seen scattered diffusely throughout the colon. No evidence of obvious obstruction or gross free intraperitoneal air.  D:  09/24/2021 E:  09/24/2021        XR Chest 1 View    Result Date: 9/24/2021  EXAMINATION: XR CHEST 1 VW-09/24/2021:  INDICATION: Weak/Dizzy/AMS, triage protocol.  COMPARISON: NONE.   FINDINGS: Portable chest reveals the heart to be borderline enlarged. Increased pulmonary vascularity bilaterally. No definite pleural effusion or pneumothorax. Degenerative changes seen within the spine. Increased pulmonary vascularity bilaterally.      Impression: The heart is enlarged with increased pulmonary vascularity bilaterally. No evidence of acute parenchymal disease.  D:  09/24/2021 E:  09/24/2021          Results for orders placed in visit on 07/28/16    SCANNED - ECHOCARDIOGRAM      Assessment/Plan   Assessment & Plan       Cardiomegaly    Mental disorder    History of seizure disorder    Hyperlipidemia    Pneumonia of both lower lobes due to infectious organism    Chronic diastolic CHF (congestive heart failure) (HCC)      Aspiration pneumonia  --previously treated for aspiration pneumonia several times per care giver  --currently on modified diet   --CT chest with bilateral infiltrates and tiny left pleural effusion  --Started on IV Zosyn and vancomycin in ED  --MRSA PCR pending, if negative will stop vancomycin  --SLP consultation   --NPO tonight     Seizure disorder  --Continue Keppra and Dilantin  --last seizure was one month ago  --follows with Dr Fulton     Diastolic heart failure  Cardiomegaly  --ASA    Hyperlipidemia  --Continue statin    DVT prophylaxis:  Heparin     CODE STATUS:    Level Of Support Discussed With: Health Care Surrogate  Code Status: CPR  Medical Interventions (Level of Support Prior to Arrest): Full      This note has been completed as part of a split-shared workflow.   Signature: Electronically signed by MONIKA Zaragoza, 09/24/21, 5:29 PM EDT.    Attending   Admission Attestation       I have seen and examined the patient, performing an independent face-to-face diagnostic evaluation with plan of care reviewed and developed with the advanced practice clinician (APC).      Brief Summary Statement:   Rao Alcazar is a 76 y.o. 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    He is non verbal and unable to provide history.  His caregiver is in the ER and can provide some but limited history.  He looks dehydrated and chronically ill and is tachycardic in the ER on my visit.   He breathes with his mouth wide open.    Remainder of detailed HPI is as noted by APC and has been reviewed and/or edited by me for completeness.    Attending Physical Exam:    Constitutional: sleeping, chronically ill appearing  Eyes: PERRLA, sclerae anicteric, no conjunctival injection  HENT: NCAT, dry tongue  Neck: Supple, no JVD, trachea midline  Respiratory: Clear to auscultation bilaterally, nonlabored respirations   Cardiovascular: tachy, s1 and s2  Gastrointestinal: Positive bowel sounds, soft, nontender, nondistended  Musculoskeletal: No bilateral ankle edema, no clubbing or cyanosis to extremities  Psychiatric: flat affect, not communicating  Skin: dry, + skin tenting    Brief Assessment/Plan :  See detailed assessment and plan developed with APC which I have reviewed and/or edited for completeness.      Admission Status: I believe that this patient meets INPATIENT status due to fever of unclear etiology and probable aspiration.  I feel patient’s risk for adverse outcomes and need for care warrant INPATIENT evaluation and I predict the patient’s care encounter to likely last beyond 2 midnights.        Johnathan Sanchez MD  21                          Electronically signed by Johnathan Sanchez MD at 21 2211            Physician Progress Notes (last 48 hours)      Payton Stallings APRN at 10/12/21 0806              [facility] Medicine Services  PROGRESS NOTE    Patient Name: Rao Alcazar  : 1945  MRN: [id number]    Date of Admission: 2021  Primary Care Physician: Provider, No Known    Subjective   Subjective   CC: f/u dysphagia    HPI:    Patient lying in bed and responds to some questions, but speech is difficult to understand.  Patient does not follow any commands, but appears in no distress.    ROS:  UTO due to mental disability     Objective   Objective   Vital Signs:   Temp:  [97.8 °F (36.6 °C)-98 °F (36.7 °C)] 97.8 °F (36.6 °C)  Heart Rate:  [81] 81  Resp:  [20] 20  BP: (123-137)/(60-67) 123/60  Flow (L/min):  [2] 2  Physical Exam:  Constitutional: Alert, thin ill-appearing male sitting up in bed in NAD  Eyes: EOMI, sclerae anicteric, no conjunctival injection  Head: NCAT  ENT: [place], moist mucous membranes   Respiratory: Nonlabored, symmetrical chest expansion, CTAB  Cardiovascular: RRR, no M/R/G, +DP pulses bilaterally  Gastrointestinal: Soft, NT, ND +BS  Musculoskeletal: CRABTREE; no LE edema bilaterally  Neurologic: Alert but unable to determine orientation, strength symmetric in all extremities, does not follow commands, speech difficult to underestand  Skin: No rashes on exposed skin  Psychiatric: Pleasant and cooperative; flat affect    Results Reviewed:  LAB RESULTS:          Lab 10/11/21  0521   SODIUM 136   POTASSIUM 4.2   CHLORIDE 100   CO2 27.0   ANION GAP 9.0   BUN 7*   CREATININE 0.44*   GLUCOSE 100*   CALCIUM 9.1   MAGNESIUM 1.9   PHOSPHORUS 2.7         Lab 10/11/21  0521   TOTAL PROTEIN 7.9   ALBUMIN 3.70   ALT (SGPT) 26   AST (SGOT) 26   BILIRUBIN 0.2   ALK PHOS 143*             Lab 10/11/21  0521   CHOLESTEROL 148   TRIGLYCERIDES 177*             Brief Urine Lab Results  (Last result in the past 365 days)      Color   Clarity   Blood   Leuk Est   Nitrite   Protein   CREAT   Urine HCG        09/24/21 1203 Yellow   Clear   Negative   Negative   Negative   Negative                 Microbiology Results Abnormal     Procedure Component Value - Date/Time    Blood Culture - Blood, Arm, Right [[id number]] Collected: 09/24/21 1150    Lab Status: Final result Specimen: Blood from Arm, Right Updated: 09/29/21 1231     Blood Culture No growth  at 5 days    Blood Culture - Blood, Arm, Left [[id number]] Collected: 09/24/21 1150    Lab Status: Final result Specimen: Blood from Arm, Left Updated: 09/29/21 1231     Blood Culture No growth at 5 days    COVID PRE-OP / PRE-PROCEDURE SCREENING ORDER (NO ISOLATION) - Swab, Nasopharynx [[id number]]  (Normal) Collected: 09/24/21 1208    Lab Status: Final result Specimen: Swab from Nasopharynx Updated: 09/24/21 1236    Narrative:      The following orders were created for panel order COVID PRE-OP / PRE-PROCEDURE SCREENING ORDER (NO ISOLATION) - Swab, Nasopharynx.  Procedure                               Abnormality         Status                     ---------                               -----------         ------                     COVID-19 and FLU A/B PCR...[[id number]]  Normal              Final result                 Please view results for these tests on the individual orders.    COVID-19 and FLU A/B PCR - Swab, Nasopharynx [[id number]]  (Normal) Collected: 09/24/21 1208    Lab Status: Final result Specimen: Swab from Nasopharynx Updated: 09/24/21 1236     COVID19 Not Detected     Influenza A PCR Not Detected     Influenza B PCR Not Detected    Narrative:      Fact sheet for providers: https://www.fda.gov/media/732269/download    Fact sheet for patients: https://www.fda.gov/media/235336/download    Test performed by PCR.        CXR personally reviewed showing bilateral opacities. Agree with interpretation.    Results for orders placed during the hospital encounter of 09/24/21    Adult Transthoracic Echo Complete W/ Cont if Necessary Per Protocol    Interpretation Summary  · Normal left ventricular systolic function, estimated EF 60%.  · Aortic sclerosis without aortic stenosis.  · Trace aortic insufficiency.  · Trace mitral regurgitation, trace tricuspid regurgitation, normal RVSP.      I have reviewed the medications:  Scheduled Meds:aspirin, 81 mg, Oral, Daily   Or  aspirin, 300 mg, Rectal, Daily  atorvastatin,  "10 mg, Oral, Nightly  castor oil-balsam peru, 1 application, Topical, Q12H  heparin (porcine), 5,000 Units, Subcutaneous, Q8H  levETIRAcetam, 2,000 mg, Oral, Q12H  levothyroxine, 25 mcg, Oral, Q AM  palliative care oral rinse, , Mouth/Throat, TID - RT  phenytoin, 125 mg, Oral, TID  senna-docusate sodium, 2 tablet, Oral, BID      Continuous Infusions:   PRN Meds:.•  acetaminophen  •  senna-docusate sodium **AND** polyethylene glycol **AND** bisacodyl **AND** bisacodyl    Assessment/Plan   Assessment & Plan     Active Hospital Problems    Diagnosis  POA   • Pneumonia of both lower lobes due to infectious organism [J18.9]  Yes   • Chronic diastolic CHF (congestive heart failure) ([facility]) [I50.32]  Unknown   • Hyperlipidemia [E78.5]  Yes   • Mental disorder [F99]  Yes   • History of seizure disorder [Z86.69]  Not Applicable   • Cardiomegaly [I51.7]  Yes      Resolved Hospital Problems   No resolved problems to display.     Brief Hospital Course to date:  Rao Alcazar is a 76 y.o. male patient with severe intellectual disability, nonverbal at baseline (guardian of state), with history of dyslipidemia, seizure disorder and chronic hypoxic respiratory failure on home oxygen at 2 L/min presented to the hospital with fever and cough and was found to have bilateral basal pneumonia. This is my first day assessing patient's active medical issues.    GOC  -My partner prior had d/w patient's family. This patient suffers from chronic conditions such as seizure disorder, cognitive delay and heart failure. DNR/DNI is appropriate as this would only prolong suffering and would most likely not be successful -guardian in agreement - changed to DNR/DNI.  -Most currently this patient has been diagnosed with severe dysphagia. He has been receiving nutrition via an NG tube which is temporary but he has pulled at this tube and continues trying to push it out with his mouth. His sitter, whom knows him well, states that he \"lives to eat\". A " comfort diet along with comfort measures would be most appropriate as a PEG placement and any further aggressive treatment would only prolong suffering. He is progressive in his chronic conditions with no improvement foreseen.   --Patient moved 5 feet due to difficult placement and no need for monitoring     Aspiration pneumonia  Dysphagia   -CT chest showed bilateral pulmonary infiltrates and pleural effusion, concerning for community acquired pneumonia  -Has completed course of IV abx.  -Continue comfort diet.  -CM/SW following for disposition, looking for LTC facility  --AM labs unremarkable     Seizure disorder  -Keppra and Dilantin  -Seizure precautions, stable     Elevated alk phos, trending down    Diastolic heart failure, not in decompensation  -echo EF 60%, aortic stenosis, normal RVSP   -Compensated. Continue aspirin     Hyperlipidemia  -Continue statin     Severe intellectual disability   Nonverbal   --Stable to baseline    DVT prophylaxis:  Medical DVT prophylaxis orders are present.       AM-PAC 6 Clicks Score (PT): 6 (10/11/21 0900)    Disposition: I expect the patient to be discharged TBD.  Pending CM finding a facility for long term care and hospice; SW now involved for difficult placement    CODE STATUS:   Code Status and Medical Interventions:   Ordered at: 10/05/21 1420     Code Status:    No CPR     Medical Interventions (Level of Support Prior to Arrest):    Comfort Measures       MONIKA Morales  10/12/21              Electronically signed by Payton Stallings APRN at 10/12/21 1126     Payton Stallings APRN at 10/11/21 1144              [facility] Medicine Services  PROGRESS NOTE    Patient Name: Rao Alcazar  : 1945  MRN: [id number]    Date of Admission: 2021  Primary Care Physician: Provider, No Known    Subjective   Subjective   CC: f/u dysphagia    HPI:   Patient very difficult to understand, but pleasant.  Follows simple waiting placement in  NAD.    ROS:  UTO due to mental disability     Objective   Objective   Vital Signs:   Temp:  [97.7 °F (36.5 °C)] 97.7 °F (36.5 °C)  Heart Rate:  [69-82] 78  Resp:  [20] 20  BP: (132)/(65) 132/65  Flow (L/min):  [2] 2  Physical Exam:  Constitutional: Alert, thin ill-appearing male sitting up in bed in NAD  Eyes: EOMI, sclerae anicteric, no conjunctival injection  Head: NCAT  ENT: [place], moist mucous membranes   Respiratory: Nonlabored, symmetrical chest expansion, CTAB  Cardiovascular: RRR, HR 72,, 100% RA no M/R/G, +DP pulses bilaterally  Gastrointestinal: Soft, NT, ND +BS  Musculoskeletal: CRABTREE; no LE edema bilaterally  Neurologic: Alert but unable to determine orientation, strength symmetric in all extremities, does not follow commands, speech difficult to underestand  Skin: No rashes on exposed skin  Psychiatric: Pleasant and cooperative; flat affect    Results Reviewed:  LAB RESULTS:          Lab 10/11/21  0521   SODIUM 136   POTASSIUM 4.2   CHLORIDE 100   CO2 27.0   ANION GAP 9.0   BUN 7*   CREATININE 0.44*   GLUCOSE 100*   CALCIUM 9.1   MAGNESIUM 1.9   PHOSPHORUS 2.7         Lab 10/11/21  0521   TOTAL PROTEIN 7.9   ALBUMIN 3.70   ALT (SGPT) 26   AST (SGOT) 26   BILIRUBIN 0.2   ALK PHOS 143*             Lab 10/11/21  0521   CHOLESTEROL 148   TRIGLYCERIDES 177*             Brief Urine Lab Results  (Last result in the past 365 days)      Color   Clarity   Blood   Leuk Est   Nitrite   Protein   CREAT   Urine HCG        09/24/21 1203 Yellow   Clear   Negative   Negative   Negative   Negative                 Microbiology Results Abnormal     Procedure Component Value - Date/Time    Blood Culture - Blood, Arm, Right [[id number]] Collected: 09/24/21 1150    Lab Status: Final result Specimen: Blood from Arm, Right Updated: 09/29/21 1231     Blood Culture No growth at 5 days    Blood Culture - Blood, Arm, Left [[id number]] Collected: 09/24/21 1150    Lab Status: Final result Specimen: Blood from Arm, Left Updated: 09/29/21  1231     Blood Culture No growth at 5 days    COVID PRE-OP / PRE-PROCEDURE SCREENING ORDER (NO ISOLATION) - Swab, Nasopharynx [[id number]]  (Normal) Collected: 09/24/21 1208    Lab Status: Final result Specimen: Swab from Nasopharynx Updated: 09/24/21 1236    Narrative:      The following orders were created for panel order COVID PRE-OP / PRE-PROCEDURE SCREENING ORDER (NO ISOLATION) - Swab, Nasopharynx.  Procedure                               Abnormality         Status                     ---------                               -----------         ------                     COVID-19 and FLU A/B PCR...[[id number]]  Normal              Final result                 Please view results for these tests on the individual orders.    COVID-19 and FLU A/B PCR - Swab, Nasopharynx [[id number]]  (Normal) Collected: 09/24/21 1208    Lab Status: Final result Specimen: Swab from Nasopharynx Updated: 09/24/21 1236     COVID19 Not Detected     Influenza A PCR Not Detected     Influenza B PCR Not Detected    Narrative:      Fact sheet for providers: https://www.fda.gov/media/762432/download    Fact sheet for patients: https://www.fda.gov/media/879454/download    Test performed by PCR.        CXR personally reviewed showing bilateral opacities. Agree with interpretation.    Results for orders placed during the hospital encounter of 09/24/21    Adult Transthoracic Echo Complete W/ Cont if Necessary Per Protocol    Interpretation Summary  · Normal left ventricular systolic function, estimated EF 60%.  · Aortic sclerosis without aortic stenosis.  · Trace aortic insufficiency.  · Trace mitral regurgitation, trace tricuspid regurgitation, normal RVSP.      I have reviewed the medications:  Scheduled Meds:aspirin, 81 mg, Oral, Daily   Or  aspirin, 300 mg, Rectal, Daily  atorvastatin, 10 mg, Oral, Nightly  castor oil-balsam peru, 1 application, Topical, Q12H  heparin (porcine), 5,000 Units, Subcutaneous, Q8H  levETIRAcetam, 2,000 mg, Oral,  "Q12H  levothyroxine, 25 mcg, Oral, Q AM  palliative care oral rinse, , Mouth/Throat, TID - RT  phenytoin, 125 mg, Oral, TID  senna-docusate sodium, 2 tablet, Oral, BID      Continuous Infusions:   PRN Meds:.•  acetaminophen  •  senna-docusate sodium **AND** polyethylene glycol **AND** bisacodyl **AND** bisacodyl    Assessment/Plan   Assessment & Plan     Active Hospital Problems    Diagnosis  POA   • Pneumonia of both lower lobes due to infectious organism [J18.9]  Yes   • Chronic diastolic CHF (congestive heart failure) (HCC) [I50.32]  Unknown   • Hyperlipidemia [E78.5]  Yes   • Mental disorder [F99]  Yes   • History of seizure disorder [Z86.69]  Not Applicable   • Cardiomegaly [I51.7]  Yes      Resolved Hospital Problems   No resolved problems to display.     Brief Hospital Course to date:  Rao Alcazar is a 76 y.o. male patient with severe intellectual disability, nonverbal at baseline (guardian of state), with history of dyslipidemia, seizure disorder and chronic hypoxic respiratory failure on home oxygen at 2 L/min presented to the hospital with fever and cough and was found to have bilateral basal pneumonia. This is my first day assessing patient's active medical issues.     These problems are new to me today    This patient's problems and plans were partially entered by my partner and updated as appropriate by me 10/11/21.    GO  -My partner prior had d/w patient's family. This patient suffers from chronic conditions such as seizure disorder, cognitive delay and heart failure. DNR/DNI is appropriate as this would only prolong suffering and would most likely not be successful -guardian in agreement - changed to DNR/DNI.  -Most currently this patient has been diagnosed with severe dysphagia. He has been receiving nutrition via an NG tube which is temporary but he has pulled at this tube and continues trying to push it out with his mouth. His sitter, whom knows him well, states that he \"lives to eat\". A " comfort diet along with comfort measures would be most appropriate as a PEG placement and any further aggressive treatment would only prolong suffering. He is progressive in his chronic conditions with no improvement foreseen.      Aspiration pneumonia  Dysphagia   -CT chest showed bilateral pulmonary infiltrates and pleural effusion, concerning for community acquired pneumonia  -Has completed course of IV abx.  -Continue comfort diet.  -CM/SW following for disposition, looking for LTC facility  --AM labs unremarkable     Seizure disorder  -Keppra and Dilantin  -Seizure precautions, stable     Elevated alk phos, trending down    Diastolic heart failure, not in decompensation  -echo EF 60%, aortic stenosis, normal RVSP   -Compensated. Continue aspirin     Hyperlipidemia  -Continue statin     Severe intellectual disability   Nonverbal   --Stable to baseline    DVT prophylaxis:  Medical DVT prophylaxis orders are present.       AM-PAC 6 Clicks Score (PT): 6 (10/11/21 0900)    Disposition: I expect the patient to be discharged TBD.  Pending CM finding a facility for long term care and hospice    CODE STATUS:   Code Status and Medical Interventions:   Ordered at: 10/05/21 1420     Code Status:    No CPR     Medical Interventions (Level of Support Prior to Arrest):    Comfort Measures       MONIKA Morales  10/11/21              Electronically signed by Payton Stallings APRN at 10/11/21 1326

## 2021-10-12 NOTE — CASE MANAGEMENT/SOCIAL WORK
Continued Stay Note  [facility]     Patient Name: Rao Alcazar  MRN: [id number]  Today's Date: 10/12/2021    Admit Date: 9/24/2021     Discharge Plan     Row Name 10/12/21 1508       Plan    Plan Comments NALINI followed up with Tom at [facility] as she had called inquiring about referral last week. Tom reports she will speak with her director of nursing to see if can accept pt. Pt's Guardian had also mentioned [facility] placement facilities as [place] [facility]. NALINI faxed referrals to the 4 facilities in [facility]. NALINI also called and faxed referrals to the [facility] facilities in [facility], [facility] and [facility].                                     Discharge Codes    No documentation.               Expected Discharge Date and Time     Expected Discharge Date Expected Discharge Time    Oct 15, 2021             CHELSEY Jurado

## 2021-10-12 NOTE — PLAN OF CARE
Problem: Adult Inpatient Plan of Care  Goal: Plan of Care Review  Outcome: Ongoing, Progressing  Flowsheets  Taken 10/12/2021 0406 by Kraig Christensen, RN  Outcome Summary: Pt TRN shortly after shift start to 5B. Pt in good spirits. Difficulty communicating. Swallows pills whole in applesauce. Turning q2. Uop adq. Continue to monitor. Palliative following pending placement.  Taken 10/9/2021 2011 by Radhika Hollis, RN  Progress: no change  Plan of Care Reviewed With:   family   caregiver   Goal Outcome Evaluation:              Outcome Summary: Pt TRN shortly after shift start to 5B. Pt in good spirits. Difficulty communicating. Swallows pills whole in applesauce. Turning q2. Uop adq. Continue to monitor. Palliative following pending placement.

## 2021-10-12 NOTE — PROGRESS NOTES
Continued Stay Note  [facility]     Patient Name: Rao Alcazar  MRN: [id number]  Today's Date: 10/12/2021    Admit Date: 9/24/2021     Discharge Plan     Row Name 10/12/21 1404       Plan    Plan Placement    Plan Comments Per conversation with , still looking for placement.  Hospice will continue to follow.  Please, call [phone] if I can be of further assistance.               Discharge Codes    No documentation.               Expected Discharge Date and Time     Expected Discharge Date Expected Discharge Time    Oct 15, 2021             Fernando Patricia RN

## 2021-10-12 NOTE — PROGRESS NOTES
[facility] Medicine Services  PROGRESS NOTE    Patient Name: Rao Alcazar  : 1945  MRN: [id number]    Date of Admission: 2021  Primary Care Physician: Provider, No Known    Subjective   Subjective   CC: f/u dysphagia    HPI:   Patient lying in bed and responds to some questions, but speech is difficult to understand.  Patient does not follow any commands, but appears in no distress.    ROS:  UTO due to mental disability     Objective   Objective   Vital Signs:   Temp:  [97.8 °F (36.6 °C)-98 °F (36.7 °C)] 97.8 °F (36.6 °C)  Heart Rate:  [81] 81  Resp:  [20] 20  BP: (123-137)/(60-67) 123/60  Flow (L/min):  [2] 2  Physical Exam:  Constitutional: Alert, thin ill-appearing male sitting up in bed in NAD  Eyes: EOMI, sclerae anicteric, no conjunctival injection  Head: NCAT  ENT: [place], moist mucous membranes   Respiratory: Nonlabored, symmetrical chest expansion, CTAB  Cardiovascular: RRR, no M/R/G, +DP pulses bilaterally  Gastrointestinal: Soft, NT, ND +BS  Musculoskeletal: CRABTREE; no LE edema bilaterally  Neurologic: Alert but unable to determine orientation, strength symmetric in all extremities, does not follow commands, speech difficult to underestand  Skin: No rashes on exposed skin  Psychiatric: Pleasant and cooperative; flat affect    Results Reviewed:  LAB RESULTS:          Lab 10/11/21  0521   SODIUM 136   POTASSIUM 4.2   CHLORIDE 100   CO2 27.0   ANION GAP 9.0   BUN 7*   CREATININE 0.44*   GLUCOSE 100*   CALCIUM 9.1   MAGNESIUM 1.9   PHOSPHORUS 2.7         Lab 10/11/21  0521   TOTAL PROTEIN 7.9   ALBUMIN 3.70   ALT (SGPT) 26   AST (SGOT) 26   BILIRUBIN 0.2   ALK PHOS 143*             Lab 10/11/21  0521   CHOLESTEROL 148   TRIGLYCERIDES 177*             Brief Urine Lab Results  (Last result in the past 365 days)      Color   Clarity   Blood   Leuk Est   Nitrite   Protein   CREAT   Urine HCG        21 1203 Yellow   Clear   Negative   Negative   Negative   Negative                  Microbiology Results Abnormal     Procedure Component Value - Date/Time    Blood Culture - Blood, Arm, Right [[id number]] Collected: 09/24/21 1150    Lab Status: Final result Specimen: Blood from Arm, Right Updated: 09/29/21 1231     Blood Culture No growth at 5 days    Blood Culture - Blood, Arm, Left [[id number]] Collected: 09/24/21 1150    Lab Status: Final result Specimen: Blood from Arm, Left Updated: 09/29/21 1231     Blood Culture No growth at 5 days    COVID PRE-OP / PRE-PROCEDURE SCREENING ORDER (NO ISOLATION) - Swab, Nasopharynx [[id number]]  (Normal) Collected: 09/24/21 1208    Lab Status: Final result Specimen: Swab from Nasopharynx Updated: 09/24/21 1236    Narrative:      The following orders were created for panel order COVID PRE-OP / PRE-PROCEDURE SCREENING ORDER (NO ISOLATION) - Swab, Nasopharynx.  Procedure                               Abnormality         Status                     ---------                               -----------         ------                     COVID-19 and FLU A/B PCR...[[id number]]  Normal              Final result                 Please view results for these tests on the individual orders.    COVID-19 and FLU A/B PCR - Swab, Nasopharynx [[id number]]  (Normal) Collected: 09/24/21 1208    Lab Status: Final result Specimen: Swab from Nasopharynx Updated: 09/24/21 1236     COVID19 Not Detected     Influenza A PCR Not Detected     Influenza B PCR Not Detected    Narrative:      Fact sheet for providers: https://www.fda.gov/media/837270/download    Fact sheet for patients: https://www.fda.gov/media/725352/download    Test performed by PCR.        CXR personally reviewed showing bilateral opacities. Agree with interpretation.    Results for orders placed during the hospital encounter of 09/24/21    Adult Transthoracic Echo Complete W/ Cont if Necessary Per Protocol    Interpretation Summary  · Normal left ventricular systolic function, estimated EF 60%.  · Aortic sclerosis  without aortic stenosis.  · Trace aortic insufficiency.  · Trace mitral regurgitation, trace tricuspid regurgitation, normal RVSP.      I have reviewed the medications:  Scheduled Meds:aspirin, 81 mg, Oral, Daily   Or  aspirin, 300 mg, Rectal, Daily  atorvastatin, 10 mg, Oral, Nightly  castor oil-balsam peru, 1 application, Topical, Q12H  heparin (porcine), 5,000 Units, Subcutaneous, Q8H  levETIRAcetam, 2,000 mg, Oral, Q12H  levothyroxine, 25 mcg, Oral, Q AM  palliative care oral rinse, , Mouth/Throat, TID - RT  phenytoin, 125 mg, Oral, TID  senna-docusate sodium, 2 tablet, Oral, BID      Continuous Infusions:   PRN Meds:.•  acetaminophen  •  senna-docusate sodium **AND** polyethylene glycol **AND** bisacodyl **AND** bisacodyl    Assessment/Plan   Assessment & Plan     Active Hospital Problems    Diagnosis  POA   • Pneumonia of both lower lobes due to infectious organism [J18.9]  Yes   • Chronic diastolic CHF (congestive heart failure) (HCC) [I50.32]  Unknown   • Hyperlipidemia [E78.5]  Yes   • Mental disorder [F99]  Yes   • History of seizure disorder [Z86.69]  Not Applicable   • Cardiomegaly [I51.7]  Yes      Resolved Hospital Problems   No resolved problems to display.     Brief Hospital Course to date:  Rao Alcazar is a 76 y.o. male patient with severe intellectual disability, nonverbal at baseline (guardian of state), with history of dyslipidemia, seizure disorder and chronic hypoxic respiratory failure on home oxygen at 2 L/min presented to the hospital with fever and cough and was found to have bilateral basal pneumonia. This is my first day assessing patient's active medical issues.    GOC  -My partner prior had d/w patient's family. This patient suffers from chronic conditions such as seizure disorder, cognitive delay and heart failure. DNR/DNI is appropriate as this would only prolong suffering and would most likely not be successful -guardian in agreement - changed to DNR/DNI.  -Most currently this  "patient has been diagnosed with severe dysphagia. He has been receiving nutrition via an NG tube which is temporary but he has pulled at this tube and continues trying to push it out with his mouth. His sitter, whom knows him well, states that he \"lives to eat\". A comfort diet along with comfort measures would be most appropriate as a PEG placement and any further aggressive treatment would only prolong suffering. He is progressive in his chronic conditions with no improvement foreseen.   --Patient moved 5 feet due to difficult placement and no need for monitoring     Aspiration pneumonia  Dysphagia   -CT chest showed bilateral pulmonary infiltrates and pleural effusion, concerning for community acquired pneumonia  -Has completed course of IV abx.  -Continue comfort diet.  -CM/SW following for disposition, looking for LTC facility  --AM labs unremarkable     Seizure disorder  -Keppra and Dilantin  -Seizure precautions, stable     Elevated alk phos, trending down    Diastolic heart failure, not in decompensation  -echo EF 60%, aortic stenosis, normal RVSP   -Compensated. Continue aspirin     Hyperlipidemia  -Continue statin     Severe intellectual disability   Nonverbal   --Stable to baseline    DVT prophylaxis:  Medical DVT prophylaxis orders are present.       AM-PAC 6 Clicks Score (PT): 6 (10/11/21 0900)    Disposition: I expect the patient to be discharged TBD.  Pending CM finding a facility for long term care and hospice; SW now involved for difficult placement    CODE STATUS:   Code Status and Medical Interventions:   Ordered at: 10/05/21 1799     Code Status:    No CPR     Medical Interventions (Level of Support Prior to Arrest):    Comfort Measures       Payton Stallings, MONIKA  10/12/21            "

## 2021-10-13 PROCEDURE — 25010000002 HEPARIN (PORCINE) PER 1000 UNITS: Performed by: NURSE PRACTITIONER

## 2021-10-13 PROCEDURE — 99231 SBSQ HOSP IP/OBS SF/LOW 25: CPT | Performed by: NURSE PRACTITIONER

## 2021-10-13 RX ADMIN — CASTOR OIL AND BALSAM, PERU 1 APPLICATION: 788; 87 OINTMENT TOPICAL at 21:24

## 2021-10-13 RX ADMIN — MINERAL OIL: 1000 LIQUID ORAL at 08:39

## 2021-10-13 RX ADMIN — HEPARIN SODIUM 5000 UNITS: 5000 INJECTION, SOLUTION INTRAVENOUS; SUBCUTANEOUS at 21:24

## 2021-10-13 RX ADMIN — ATORVASTATIN CALCIUM 10 MG: 10 TABLET, FILM COATED ORAL at 21:24

## 2021-10-13 RX ADMIN — LEVETIRACETAM 2000 MG: 100 SOLUTION ORAL at 08:38

## 2021-10-13 RX ADMIN — LEVETIRACETAM 2000 MG: 100 SOLUTION ORAL at 21:25

## 2021-10-13 RX ADMIN — HEPARIN SODIUM 5000 UNITS: 5000 INJECTION, SOLUTION INTRAVENOUS; SUBCUTANEOUS at 14:15

## 2021-10-13 RX ADMIN — ASPIRIN 81 MG CHEWABLE TABLET 81 MG: 81 TABLET CHEWABLE at 08:38

## 2021-10-13 RX ADMIN — LEVOTHYROXINE SODIUM 25 MCG: 25 TABLET ORAL at 06:03

## 2021-10-13 RX ADMIN — PHENYTOIN 125 MG: 125 SUSPENSION ORAL at 21:25

## 2021-10-13 RX ADMIN — PHENYTOIN 125 MG: 125 SUSPENSION ORAL at 16:11

## 2021-10-13 RX ADMIN — CASTOR OIL AND BALSAM, PERU 1 APPLICATION: 788; 87 OINTMENT TOPICAL at 08:39

## 2021-10-13 RX ADMIN — HEPARIN SODIUM 5000 UNITS: 5000 INJECTION, SOLUTION INTRAVENOUS; SUBCUTANEOUS at 06:03

## 2021-10-13 RX ADMIN — MINERAL OIL: 1000 LIQUID ORAL at 14:15

## 2021-10-13 RX ADMIN — PHENYTOIN 125 MG: 125 SUSPENSION ORAL at 08:38

## 2021-10-13 RX ADMIN — DOCUSATE SODIUM 50 MG AND SENNOSIDES 8.6 MG 2 TABLET: 8.6; 5 TABLET, FILM COATED ORAL at 08:38

## 2021-10-13 RX ADMIN — MINERAL OIL: 1000 LIQUID ORAL at 20:32

## 2021-10-13 NOTE — PROGRESS NOTES
Continued Stay Note  [facility]     Patient Name: Rao Alcazar  MRN: [id number]  Today's Date: 10/13/2021    Admit Date: 9/24/2021     Discharge Plan     Row Name 10/13/21 1241       Plan    Plan LTC placement with Hospice    Patient/Family in Agreement with Plan yes    Plan Comments Hospice continues to follow the pt. for hospice services in the LTC setting. Once pt.’s placement has been secured, hospice will complete the referral to the correct hospice service area. Hospice will continue to follow. Please call [phone], if we can be of further assistance.               Discharge Codes    No documentation.               Expected Discharge Date and Time     Expected Discharge Date Expected Discharge Time    Oct 15, 2021             Rosanna Alcazar

## 2021-10-13 NOTE — PROGRESS NOTES
[facility] Medicine Services  PROGRESS NOTE    Patient Name: Rao Alcazar  : 1945  MRN: [id number]    Date of Admission: 2021  Primary Care Physician: Provider, No Known    Subjective   Subjective   CC: f/u dysphagia    HPI:   Patient lying in bed and can respond to some questions, but speech is very difficult to understand.  Patient is unable to follow any commands, but is in no distress.  Awaiting placement.      ROS:  UTO due to mental disability     Objective   Objective   Vital Signs:   Temp:  [98.2 °F (36.8 °C)-98.3 °F (36.8 °C)] 98.3 °F (36.8 °C)  Heart Rate:  [82-89] 82  Resp:  [17-18] 17  BP: ()/(60-75) 98/60  Physical Exam:  Constitutional: Alert, thin ill-appearing male sitting up in bed in NAD  Eyes: EOMI, sclerae anicteric, no conjunctival injection  Head: NCAT  ENT: [place], moist mucous membranes   Respiratory: Nonlabored, symmetrical chest expansion, CTAB  Cardiovascular: RRR, no M/R/G, +DP pulses bilaterally  Gastrointestinal: Soft, NT, ND +BS  Musculoskeletal: CRABTREE; no LE edema bilaterally  Neurologic: Alert but unable to determine orientation, strength symmetric in all extremities, does not follow commands, speech difficult to underestand  Skin: No rashes on exposed skin  Psychiatric: Pleasant and cooperative; flat affect    Results Reviewed:  LAB RESULTS:          Lab 10/11/21  0521   SODIUM 136   POTASSIUM 4.2   CHLORIDE 100   CO2 27.0   ANION GAP 9.0   BUN 7*   CREATININE 0.44*   GLUCOSE 100*   CALCIUM 9.1   MAGNESIUM 1.9   PHOSPHORUS 2.7         Lab 10/11/21  0521   TOTAL PROTEIN 7.9   ALBUMIN 3.70   ALT (SGPT) 26   AST (SGOT) 26   BILIRUBIN 0.2   ALK PHOS 143*             Lab 10/11/21  0521   CHOLESTEROL 148   TRIGLYCERIDES 177*             Brief Urine Lab Results  (Last result in the past 365 days)      Color   Clarity   Blood   Leuk Est   Nitrite   Protein   CREAT   Urine HCG        21 1203 Yellow   Clear   Negative   Negative   Negative    Negative                 Microbiology Results Abnormal     Procedure Component Value - Date/Time    Blood Culture - Blood, Arm, Right [[id number]] Collected: 09/24/21 1150    Lab Status: Final result Specimen: Blood from Arm, Right Updated: 09/29/21 1231     Blood Culture No growth at 5 days    Blood Culture - Blood, Arm, Left [[id number]] Collected: 09/24/21 1150    Lab Status: Final result Specimen: Blood from Arm, Left Updated: 09/29/21 1231     Blood Culture No growth at 5 days    COVID PRE-OP / PRE-PROCEDURE SCREENING ORDER (NO ISOLATION) - Swab, Nasopharynx [[id number]]  (Normal) Collected: 09/24/21 1208    Lab Status: Final result Specimen: Swab from Nasopharynx Updated: 09/24/21 1236    Narrative:      The following orders were created for panel order COVID PRE-OP / PRE-PROCEDURE SCREENING ORDER (NO ISOLATION) - Swab, Nasopharynx.  Procedure                               Abnormality         Status                     ---------                               -----------         ------                     COVID-19 and FLU A/B PCR...[[id number]]  Normal              Final result                 Please view results for these tests on the individual orders.    COVID-19 and FLU A/B PCR - Swab, Nasopharynx [[id number]]  (Normal) Collected: 09/24/21 1208    Lab Status: Final result Specimen: Swab from Nasopharynx Updated: 09/24/21 1236     COVID19 Not Detected     Influenza A PCR Not Detected     Influenza B PCR Not Detected    Narrative:      Fact sheet for providers: https://www.fda.gov/media/267346/download    Fact sheet for patients: https://www.fda.gov/media/785328/download    Test performed by PCR.        CXR personally reviewed showing bilateral opacities. Agree with interpretation.    Results for orders placed during the hospital encounter of 09/24/21    Adult Transthoracic Echo Complete W/ Cont if Necessary Per Protocol    Interpretation Summary  · Normal left ventricular systolic function, estimated EF 60%.  ·  Aortic sclerosis without aortic stenosis.  · Trace aortic insufficiency.  · Trace mitral regurgitation, trace tricuspid regurgitation, normal RVSP.      I have reviewed the medications:  Scheduled Meds:aspirin, 81 mg, Oral, Daily   Or  aspirin, 300 mg, Rectal, Daily  atorvastatin, 10 mg, Oral, Nightly  castor oil-balsam peru, 1 application, Topical, Q12H  heparin (porcine), 5,000 Units, Subcutaneous, Q8H  levETIRAcetam, 2,000 mg, Oral, Q12H  levothyroxine, 25 mcg, Oral, Q AM  palliative care oral rinse, , Mouth/Throat, TID - RT  phenytoin, 125 mg, Oral, TID  senna-docusate sodium, 2 tablet, Oral, BID      Continuous Infusions:   PRN Meds:.•  acetaminophen  •  senna-docusate sodium **AND** polyethylene glycol **AND** bisacodyl **AND** bisacodyl    Assessment/Plan   Assessment & Plan     Active Hospital Problems    Diagnosis  POA   • Pneumonia of both lower lobes due to infectious organism [J18.9]  Yes   • Chronic diastolic CHF (congestive heart failure) (HCC) [I50.32]  Unknown   • Hyperlipidemia [E78.5]  Yes   • Mental disorder [F99]  Yes   • History of seizure disorder [Z86.69]  Not Applicable   • Cardiomegaly [I51.7]  Yes      Resolved Hospital Problems   No resolved problems to display.     Brief Hospital Course to date:  Rao Alcazar is a 76 y.o. male patient with severe intellectual disability, nonverbal at baseline (guardian of state), with history of dyslipidemia, seizure disorder and chronic hypoxic respiratory failure on home oxygen at 2 L/min presented to the hospital with fever and cough and was found to have bilateral basal pneumonia. This is my first day assessing patient's active medical issues.    GOC  -My partner prior had d/w patient's family. This patient suffers from chronic conditions such as seizure disorder, cognitive delay and heart failure. DNR/DNI is appropriate as this would only prolong suffering and would most likely not be successful -guardian in agreement - changed to DNR/DNI.  -Most  "currently this patient has been diagnosed with severe dysphagia. He has been receiving nutrition via an NG tube which is temporary but he has pulled at this tube and continues trying to push it out with his mouth. His sitter, whom knows him well, states that he \"lives to eat\". A comfort diet along with comfort measures would be most appropriate as a PEG placement and any further aggressive treatment would only prolong suffering. He is progressive in his chronic conditions with no improvement foreseen.   --Patient moved 5 feet due to difficult placement and no need for monitoring     Aspiration pneumonia  Dysphagia   -CT chest showed bilateral pulmonary infiltrates and pleural effusion, concerning for community acquired pneumonia  -Has completed course of IV abx.  -Continue comfort diet.  -CM/SW following for disposition, looking for LTC facility  --AM labs unremarkable     Seizure disorder  -Keppra and Dilantin  -Seizure precautions, stable     Elevated alk phos, trending down    Diastolic heart failure, not in decompensation  -echo EF 60%, aortic stenosis, normal RVSP   -Compensated. Continue aspirin     Hyperlipidemia  -Continue statin     Severe intellectual disability   Nonverbal   --Stable to baseline    DVT prophylaxis:  Medical DVT prophylaxis orders are present.       AM-PAC 6 Clicks Score (PT): 6 (10/13/21 0800)    Disposition: I expect the patient to be discharged TBD.  Pending CM finding a facility for long term care and hospice; SW now involved for difficult placement    CODE STATUS:   Code Status and Medical Interventions:   Ordered at: 10/05/21 1420     Code Status:    No CPR     Medical Interventions (Level of Support Prior to Arrest):    Comfort Measures       Payton Stallings, MONIKA  10/13/21            "

## 2021-10-13 NOTE — PLAN OF CARE
Problem: Adult Inpatient Plan of Care  Goal: Plan of Care Review  Outcome: Ongoing, Progressing  Flowsheets  Taken 10/13/2021 0235 by Kraig Christensen, RN  Plan of Care Reviewed With: patient  Taken 10/12/2021 1540 by Cyrus Gu, RN  Progress: no change  Taken 10/12/2021 1503 by Annie Bridges, RN  Outcome Summary:   Pt sleeping at time of Palliative RN encounter. Per documentation, no symptom management or GOC needs   awaiting placement.   Goal Outcome Evaluation:  Plan of Care Reviewed With: patient           Outcome Summary: Pt TRN shortly after shift start to 5B. Pt in good spirits. Difficulty communicating. Swallows pills whole in applesauce. Turning q2. Uop adq. Continue to monitor. Palliative following pending placement.

## 2021-10-13 NOTE — PROGRESS NOTES
Goals are set and symptoms are managed.  Palliative medicine will sign off.    El Vázquez DO  14:08 EDT  10/13/21

## 2021-10-13 NOTE — PLAN OF CARE
Goal Outcome Evaluation:           Progress: no change     Comfort measures maintained. Patient tolerating PO without any s/s of complications. Remains nonverbal; able to understand some words. Pills whole in applesauce without difficulties. Incontinent. Awaiting placement.

## 2021-10-13 NOTE — PLAN OF CARE
Goal Outcome Evaluation:           Progress: no change  Outcome Summary: pt is comfort measures. Pt is on a comfort diet and is eating 100% and no respiratory issues.  Pt is awaiting placement. hospice following. Palliative care will sign off. please reconsult if needed.Palliative Team meeting 1300, El Vázquez DO, Roxane Shafer ARPN, Awilda Lira RN, CHPN, Svetlana Carrillo LCSW, Annie Bridges RN CHPN, Vel Alcazar MSW, Steph Otto RN, BSN, CHPN

## 2021-10-14 PROCEDURE — 25010000002 HEPARIN (PORCINE) PER 1000 UNITS: Performed by: NURSE PRACTITIONER

## 2021-10-14 PROCEDURE — 99233 SBSQ HOSP IP/OBS HIGH 50: CPT | Performed by: FAMILY MEDICINE

## 2021-10-14 RX ADMIN — HEPARIN SODIUM 5000 UNITS: 5000 INJECTION, SOLUTION INTRAVENOUS; SUBCUTANEOUS at 22:23

## 2021-10-14 RX ADMIN — ATORVASTATIN CALCIUM 10 MG: 10 TABLET, FILM COATED ORAL at 22:25

## 2021-10-14 RX ADMIN — CASTOR OIL AND BALSAM, PERU 1 APPLICATION: 788; 87 OINTMENT TOPICAL at 22:25

## 2021-10-14 RX ADMIN — DOCUSATE SODIUM 50 MG AND SENNOSIDES 8.6 MG 2 TABLET: 8.6; 5 TABLET, FILM COATED ORAL at 22:25

## 2021-10-14 RX ADMIN — LEVETIRACETAM 2000 MG: 100 SOLUTION ORAL at 09:36

## 2021-10-14 RX ADMIN — LEVOTHYROXINE SODIUM 25 MCG: 25 TABLET ORAL at 05:11

## 2021-10-14 RX ADMIN — PHENYTOIN 125 MG: 125 SUSPENSION ORAL at 22:24

## 2021-10-14 RX ADMIN — HEPARIN SODIUM 5000 UNITS: 5000 INJECTION, SOLUTION INTRAVENOUS; SUBCUTANEOUS at 05:11

## 2021-10-14 RX ADMIN — HEPARIN SODIUM 5000 UNITS: 5000 INJECTION, SOLUTION INTRAVENOUS; SUBCUTANEOUS at 14:52

## 2021-10-14 RX ADMIN — CASTOR OIL AND BALSAM, PERU 1 APPLICATION: 788; 87 OINTMENT TOPICAL at 09:36

## 2021-10-14 RX ADMIN — DOCUSATE SODIUM 50 MG AND SENNOSIDES 8.6 MG 2 TABLET: 8.6; 5 TABLET, FILM COATED ORAL at 09:35

## 2021-10-14 RX ADMIN — ASPIRIN 81 MG CHEWABLE TABLET 81 MG: 81 TABLET CHEWABLE at 09:35

## 2021-10-14 RX ADMIN — LEVETIRACETAM 2000 MG: 100 SOLUTION ORAL at 22:24

## 2021-10-14 RX ADMIN — MINERAL OIL: 1000 LIQUID ORAL at 09:36

## 2021-10-14 RX ADMIN — PHENYTOIN 125 MG: 125 SUSPENSION ORAL at 09:35

## 2021-10-14 RX ADMIN — PHENYTOIN 125 MG: 125 SUSPENSION ORAL at 17:19

## 2021-10-14 RX ADMIN — MINERAL OIL: 1000 LIQUID ORAL at 14:52

## 2021-10-14 NOTE — PROGRESS NOTES
[facility] Medicine Services  PROGRESS NOTE    Patient Name: Rao Alcazar  : 1945  MRN: [id number]    Date of Admission: 2021  Primary Care Physician: Provider, No Known    Subjective   Subjective     CC:  F/U dysphagia     HPI:  Patient seen and examined.  Nursing notes reviewed.  No acute events overnight.  Patient can answer yes or no to some of my questions but his speech is hard to understand.  He does ask when he can go home.  He is currently waiting on placement.  His tray is by his bedside and appears he ate everything.    ROS:  UTO reliable ROS    Objective   Objective     Vital Signs:   Temp:  [98.1 °F (36.7 °C)-98.5 °F (36.9 °C)] 98.1 °F (36.7 °C)  Heart Rate:  [73-83] 73  Resp:  [17-18] 17  BP: (107-132)/(55-56) 107/55     Physical Exam:  Constitutional: No acute distress, awake, alert  HENT: NCAT, mucous membranes moist  Respiratory: Clear to auscultation bilaterally, respiratory effort normal   Cardiovascular: RRR, no murmurs, rubs, or gallops  Gastrointestinal: Positive bowel sounds, soft, nontender, nondistended  Musculoskeletal: No bilateral ankle edema  Psychiatric: Appropriate affect, cooperative  Neurologic: Speech difficult to understand, CRABTREE  Skin: No rashes    Results Reviewed:  LAB RESULTS:          Lab 10/11/21  0521   SODIUM 136   POTASSIUM 4.2   CHLORIDE 100   CO2 27.0   ANION GAP 9.0   BUN 7*   CREATININE 0.44*   GLUCOSE 100*   CALCIUM 9.1   MAGNESIUM 1.9   PHOSPHORUS 2.7         Lab 10/11/21  0521   TOTAL PROTEIN 7.9   ALBUMIN 3.70   ALT (SGPT) 26   AST (SGOT) 26   BILIRUBIN 0.2   ALK PHOS 143*             Lab 10/11/21  0521   CHOLESTEROL 148   TRIGLYCERIDES 177*             Brief Urine Lab Results  (Last result in the past 365 days)      Color   Clarity   Blood   Leuk Est   Nitrite   Protein   CREAT   Urine HCG        21 1203 Yellow   Clear   Negative   Negative   Negative   Negative                 Microbiology Results Abnormal      Procedure Component Value - Date/Time    Blood Culture - Blood, Arm, Right [[id number]] Collected: 09/24/21 1150    Lab Status: Final result Specimen: Blood from Arm, Right Updated: 09/29/21 1231     Blood Culture No growth at 5 days    Blood Culture - Blood, Arm, Left [[id number]] Collected: 09/24/21 1150    Lab Status: Final result Specimen: Blood from Arm, Left Updated: 09/29/21 1231     Blood Culture No growth at 5 days    COVID PRE-OP / PRE-PROCEDURE SCREENING ORDER (NO ISOLATION) - Swab, Nasopharynx [[id number]]  (Normal) Collected: 09/24/21 1208    Lab Status: Final result Specimen: Swab from Nasopharynx Updated: 09/24/21 1236    Narrative:      The following orders were created for panel order COVID PRE-OP / PRE-PROCEDURE SCREENING ORDER (NO ISOLATION) - Swab, Nasopharynx.  Procedure                               Abnormality         Status                     ---------                               -----------         ------                     COVID-19 and FLU A/B PCR...[[id number]]  Normal              Final result                 Please view results for these tests on the individual orders.    COVID-19 and FLU A/B PCR - Swab, Nasopharynx [[id number]]  (Normal) Collected: 09/24/21 1208    Lab Status: Final result Specimen: Swab from Nasopharynx Updated: 09/24/21 1236     COVID19 Not Detected     Influenza A PCR Not Detected     Influenza B PCR Not Detected    Narrative:      Fact sheet for providers: https://www.fda.gov/media/789670/download    Fact sheet for patients: https://www.fda.gov/media/094947/download    Test performed by PCR.          No radiology results from the last 24 hrs    Results for orders placed during the hospital encounter of 09/24/21    Adult Transthoracic Echo Complete W/ Cont if Necessary Per Protocol    Interpretation Summary  · Normal left ventricular systolic function, estimated EF 60%.  · Aortic sclerosis without aortic stenosis.  · Trace aortic insufficiency.  · Trace mitral  regurgitation, trace tricuspid regurgitation, normal RVSP.      I have reviewed the medications:  Scheduled Meds:aspirin, 81 mg, Oral, Daily   Or  aspirin, 300 mg, Rectal, Daily  atorvastatin, 10 mg, Oral, Nightly  castor oil-balsam peru, 1 application, Topical, Q12H  heparin (porcine), 5,000 Units, Subcutaneous, Q8H  levETIRAcetam, 2,000 mg, Oral, Q12H  levothyroxine, 25 mcg, Oral, Q AM  palliative care oral rinse, , Mouth/Throat, TID - RT  phenytoin, 125 mg, Oral, TID  senna-docusate sodium, 2 tablet, Oral, BID      Continuous Infusions:   PRN Meds:.•  acetaminophen  •  senna-docusate sodium **AND** polyethylene glycol **AND** bisacodyl **AND** bisacodyl    Assessment/Plan   Assessment & Plan     Active Hospital Problems    Diagnosis  POA   • Pneumonia of both lower lobes due to infectious organism [J18.9]  Yes   • Chronic diastolic CHF (congestive heart failure) (HCC) [I50.32]  Unknown   • Hyperlipidemia [E78.5]  Yes   • Mental disorder [F99]  Yes   • History of seizure disorder [Z86.69]  Not Applicable   • Cardiomegaly [I51.7]  Yes      Resolved Hospital Problems   No resolved problems to display.        Brief Hospital Course to date:  Rao Alcazar is a 76 y.o. male patient with severe intellectual disability, nonverbal at baseline (guardian of state), with history of dyslipidemia, seizure disorder and chronic hypoxic respiratory failure on home oxygen at 2 L/min presented to the hospital with fever and cough and was found to have bilateral basal pneumonia.     This patient's problems and plans were partially entered by my partner and updated as appropriate by me 10/14/21.  All problems are new to me today      GOC  -Pt DNR/DNI. He is on a comfort diet. Plan is LTC with hospice.     Aspiration pneumonia  Dysphagia   -CT chest showed bilateral pulmonary infiltrates and pleural effusion, concerning for community acquired pneumonia  -Has completed course of IV abx.  -Continue comfort diet.     Seizure  disorder  -Continue Keppra and Dilantin  -Seizure precautions     Diastolic heart failure, not in decompensation  -echo EF 60%, aortic stenosis, normal RVSP   -Compensated. Continue aspirin     Hyperlipidemia  -Continue statin     Severe intellectual disability   --Stable to baseline    DVT prophylaxis:  Medical DVT prophylaxis orders are present.       AM-PAC 6 Clicks Score (PT): 6 (10/13/21 2008)    Disposition: I expect the patient to be discharged to LTC with hospice when arranged.     CODE STATUS:   Code Status and Medical Interventions:   Ordered at: 10/05/21 1420     Code Status:    No CPR     Medical Interventions (Level of Support Prior to Arrest):    Comfort Measures       Elisabet Bazzi, DO  10/14/21

## 2021-10-14 NOTE — PLAN OF CARE
Goal Outcome Evaluation:              Outcome Summary: VSS. Comfort measures maintained. Last BM 10/13. Incontinent b/b. Pt tolerated dysphagia II diet w/o difficulty. Pt took meds whole w applesauce. Pt confused, garbled speech. RA. Pending placement.

## 2021-10-14 NOTE — CASE MANAGEMENT/SOCIAL WORK
Continued Stay Note  [facility]     Patient Name: Rao Alcazar  MRN: [id number]  Today's Date: 10/14/2021    Admit Date: 9/24/2021     Discharge Plan     Row Name 10/14/21 1517       Plan    Plan Comments NALINI followed up with current referrals and left several voicemails for the admissions staff at facilities. NALINI faxed more referrals to surrounding counties of [place].               Discharge Codes    No documentation.               Expected Discharge Date and Time     Expected Discharge Date Expected Discharge Time    Oct 15, 2021             CHELSEY Jurado

## 2021-10-15 PROCEDURE — 99232 SBSQ HOSP IP/OBS MODERATE 35: CPT | Performed by: NURSE PRACTITIONER

## 2021-10-15 PROCEDURE — 25010000002 HEPARIN (PORCINE) PER 1000 UNITS: Performed by: NURSE PRACTITIONER

## 2021-10-15 RX ADMIN — MINERAL OIL: 1000 LIQUID ORAL at 15:13

## 2021-10-15 RX ADMIN — LEVETIRACETAM 2000 MG: 100 SOLUTION ORAL at 08:41

## 2021-10-15 RX ADMIN — CASTOR OIL AND BALSAM, PERU 1 APPLICATION: 788; 87 OINTMENT TOPICAL at 20:34

## 2021-10-15 RX ADMIN — CASTOR OIL AND BALSAM, PERU 1 APPLICATION: 788; 87 OINTMENT TOPICAL at 08:41

## 2021-10-15 RX ADMIN — ATORVASTATIN CALCIUM 10 MG: 10 TABLET, FILM COATED ORAL at 20:32

## 2021-10-15 RX ADMIN — PHENYTOIN 125 MG: 125 SUSPENSION ORAL at 15:12

## 2021-10-15 RX ADMIN — PHENYTOIN 125 MG: 125 SUSPENSION ORAL at 08:41

## 2021-10-15 RX ADMIN — HEPARIN SODIUM 5000 UNITS: 5000 INJECTION, SOLUTION INTRAVENOUS; SUBCUTANEOUS at 06:10

## 2021-10-15 RX ADMIN — DOCUSATE SODIUM 50 MG AND SENNOSIDES 8.6 MG 2 TABLET: 8.6; 5 TABLET, FILM COATED ORAL at 08:41

## 2021-10-15 RX ADMIN — DOCUSATE SODIUM 50 MG AND SENNOSIDES 8.6 MG 2 TABLET: 8.6; 5 TABLET, FILM COATED ORAL at 20:32

## 2021-10-15 RX ADMIN — LEVOTHYROXINE SODIUM 25 MCG: 25 TABLET ORAL at 06:10

## 2021-10-15 RX ADMIN — PHENYTOIN 125 MG: 125 SUSPENSION ORAL at 20:33

## 2021-10-15 RX ADMIN — HEPARIN SODIUM 5000 UNITS: 5000 INJECTION, SOLUTION INTRAVENOUS; SUBCUTANEOUS at 15:12

## 2021-10-15 RX ADMIN — ASPIRIN 81 MG CHEWABLE TABLET 81 MG: 81 TABLET CHEWABLE at 08:41

## 2021-10-15 RX ADMIN — MINERAL OIL: 1000 LIQUID ORAL at 20:34

## 2021-10-15 RX ADMIN — LEVETIRACETAM 2000 MG: 100 SOLUTION ORAL at 20:32

## 2021-10-15 RX ADMIN — HEPARIN SODIUM 5000 UNITS: 5000 INJECTION, SOLUTION INTRAVENOUS; SUBCUTANEOUS at 20:34

## 2021-10-15 NOTE — CASE MANAGEMENT/SOCIAL WORK
Continued Stay Note  [facility]     Patient Name: Rao Alcazar  MRN: [id number]  Today's Date: 10/15/2021    Admit Date: 9/24/2021     Discharge Plan     Row Name 10/15/21 1319       Plan    Plan Comments Referrals for longterm facility with hospice pending. No bed offers to date. MSW sent 19 new referrals today to counties [place] as no bed offers from [place].               Discharge Codes    No documentation.               Expected Discharge Date and Time     Expected Discharge Date Expected Discharge Time    Oct 15, 2021             CHELSEY Jurado

## 2021-10-15 NOTE — PLAN OF CARE
Goal Outcome Evaluation:  Plan of Care Reviewed With: patient; no change      Problem: Adult Inpatient Plan of Care  Goal: Absence of Hospital-Acquired Illness or Injury  Outcome: Ongoing, Progressing     Problem: Adult Inpatient Plan of Care  Goal: Optimal Comfort and Wellbeing  Outcome: Ongoing, Progressing     Problem: Adult Inpatient Plan of Care  Goal: Readiness for Transition of Care  Outcome: Ongoing, Progressing     Problem: Fall Injury Risk  Goal: Absence of Fall and Fall-Related Injury  Outcome: Ongoing, Progressing

## 2021-10-15 NOTE — NURSING NOTE
Patient on WOC follow-up.  Patient now comfort measures-WOC services no longer needed.  Continue with good general skin care as patient tolerates and/or permits.  WOC will sign off.

## 2021-10-15 NOTE — PROGRESS NOTES
[facility] Medicine Services  PROGRESS NOTE    Patient Name: Rao Alcazar  : 1945  MRN: [id number]    Date of Admission: 2021  Primary Care Physician: Provider, No Known    Subjective   Subjective     CC:  F/U dysphagia     HPI:  Patient alone in room watching TV. NAD. No new concerns from staff. Asking for pudding and bubblegum.     ROS:  UTO a reliable ROS     Objective   Objective     Vital Signs:   Temp:  [97.7 °F (36.5 °C)] 97.7 °F (36.5 °C)  Heart Rate:  [89] 89  Resp:  [18] 18  BP: (139)/(74) 139/74     Physical Exam:  Constitutional: No acute distress, awake, alert sitting upright in bed   HENT: NCAT, mucous membranes moist, edentulous   Respiratory: Clear to auscultation bilaterally, respiratory effort normal   Cardiovascular: RRR, no murmurs, rubs, or gallops  Gastrointestinal: Positive bowel sounds, soft, nontender, nondistended  Musculoskeletal: No bilateral ankle edema  Psychiatric: Appropriate affect, cooperative, calm   Neurologic: CRABTREE, speech difficult to understand  Skin: No rashes    Results Reviewed:  LAB RESULTS:          Lab 10/11/21  0521   SODIUM 136   POTASSIUM 4.2   CHLORIDE 100   CO2 27.0   ANION GAP 9.0   BUN 7*   CREATININE 0.44*   GLUCOSE 100*   CALCIUM 9.1   MAGNESIUM 1.9   PHOSPHORUS 2.7         Lab 10/11/21  0521   TOTAL PROTEIN 7.9   ALBUMIN 3.70   ALT (SGPT) 26   AST (SGOT) 26   BILIRUBIN 0.2   ALK PHOS 143*             Lab 10/11/21  0521   CHOLESTEROL 148   TRIGLYCERIDES 177*             Brief Urine Lab Results  (Last result in the past 365 days)      Color   Clarity   Blood   Leuk Est   Nitrite   Protein   CREAT   Urine HCG        21 1203 Yellow   Clear   Negative   Negative   Negative   Negative                 Microbiology Results Abnormal     Procedure Component Value - Date/Time    Blood Culture - Blood, Arm, Right [[id number]] Collected: 21 1150    Lab Status: Final result Specimen: Blood from Arm, Right Updated: 21  1231     Blood Culture No growth at 5 days    Blood Culture - Blood, Arm, Left [[id number]] Collected: 09/24/21 1150    Lab Status: Final result Specimen: Blood from Arm, Left Updated: 09/29/21 1231     Blood Culture No growth at 5 days    COVID PRE-OP / PRE-PROCEDURE SCREENING ORDER (NO ISOLATION) - Swab, Nasopharynx [[id number]]  (Normal) Collected: 09/24/21 1208    Lab Status: Final result Specimen: Swab from Nasopharynx Updated: 09/24/21 1236    Narrative:      The following orders were created for panel order COVID PRE-OP / PRE-PROCEDURE SCREENING ORDER (NO ISOLATION) - Swab, Nasopharynx.  Procedure                               Abnormality         Status                     ---------                               -----------         ------                     COVID-19 and FLU A/B PCR...[[id number]]  Normal              Final result                 Please view results for these tests on the individual orders.    COVID-19 and FLU A/B PCR - Swab, Nasopharynx [[id number]]  (Normal) Collected: 09/24/21 1208    Lab Status: Final result Specimen: Swab from Nasopharynx Updated: 09/24/21 1236     COVID19 Not Detected     Influenza A PCR Not Detected     Influenza B PCR Not Detected    Narrative:      Fact sheet for providers: https://www.fda.gov/media/685373/download    Fact sheet for patients: https://www.fda.gov/media/560240/download    Test performed by PCR.          No radiology results from the last 24 hrs    Results for orders placed during the hospital encounter of 09/24/21    Adult Transthoracic Echo Complete W/ Cont if Necessary Per Protocol    Interpretation Summary  · Normal left ventricular systolic function, estimated EF 60%.  · Aortic sclerosis without aortic stenosis.  · Trace aortic insufficiency.  · Trace mitral regurgitation, trace tricuspid regurgitation, normal RVSP.      I have reviewed the medications:  Scheduled Meds:aspirin, 81 mg, Oral, Daily   Or  aspirin, 300 mg, Rectal, Daily  atorvastatin, 10  mg, Oral, Nightly  castor oil-balsam peru, 1 application, Topical, Q12H  heparin (porcine), 5,000 Units, Subcutaneous, Q8H  levETIRAcetam, 2,000 mg, Oral, Q12H  levothyroxine, 25 mcg, Oral, Q AM  palliative care oral rinse, , Mouth/Throat, TID - RT  phenytoin, 125 mg, Oral, TID  senna-docusate sodium, 2 tablet, Oral, BID      Continuous Infusions:   PRN Meds:.•  acetaminophen  •  senna-docusate sodium **AND** polyethylene glycol **AND** bisacodyl **AND** bisacodyl    Assessment/Plan   Assessment & Plan     Active Hospital Problems    Diagnosis  POA   • Pneumonia of both lower lobes due to infectious organism [J18.9]  Yes   • Chronic diastolic CHF (congestive heart failure) (HCC) [I50.32]  Unknown   • Hyperlipidemia [E78.5]  Yes   • Mental disorder [F99]  Yes   • History of seizure disorder [Z86.69]  Not Applicable   • Cardiomegaly [I51.7]  Yes      Resolved Hospital Problems   No resolved problems to display.        Brief Hospital Course to date:  Rao Alcazar is a 76 y.o. male patient with severe intellectual disability, nonverbal at baseline (guardian of state), with history of dyslipidemia, seizure disorder and chronic hypoxic respiratory failure on home oxygen at 2 L/min presented to the hospital with fever and cough and was found to have bilateral basal pneumonia.     This patient's problems and plans were partially entered by my partner and updated as appropriate by me 10/15/21.  All problems are new to me today      GOC  -Pt DNR/DNI. He is on a comfort diet. Plan is LTC with hospice.     Aspiration pneumonia  Dysphagia   -CT chest showed bilateral pulmonary infiltrates and pleural effusion, concerning for community acquired pneumonia  -Has completed course of IV abx.  -Continue comfort diet.     Seizure disorder  -Continue Keppra and Dilantin  -Seizure precautions     Diastolic heart failure, not in decompensation  -echo EF 60%, aortic stenosis, normal RVSP   -Compensated. Continue  aspirin     Hyperlipidemia  -Continue statin     Severe intellectual disability   --Stable to baseline    DVT prophylaxis:  Medical DVT prophylaxis orders are present.       AM-PAC 6 Clicks Score (PT): 8 (10/15/21 0800)    Disposition: I expect the patient to be discharged to LTC with hospice when arranged.     CODE STATUS:   Code Status and Medical Interventions:   Ordered at: 10/05/21 1420     Code Status:    No CPR     Medical Interventions (Level of Support Prior to Arrest):    Comfort Measures       Jing Tran, APRN  10/15/21

## 2021-10-15 NOTE — DISCHARGE PLACEMENT REQUEST
"Rao Reyes (76 y.o. Male)     Has State guardianFrantz [phone]. Looking for Long term with Hospice.  Grecia Lebron [phone].            Date of Birth Social Security Number Address Home Phone MRN    1945  [id number]  [facility] [id number] [phone] [id number]    Christian Marital Status             Non-Samaritan Single       Admission Date Admission Type Admitting Provider Attending Provider Department, Room/Bed    9/24/21 Emergency Elisabet Bazzi DO [place], Olivia D,  [address], [address]    Discharge Date Discharge Disposition Discharge Destination                         Attending Provider: Elisabet Bazzi DO    Allergies: No Known Allergies    Isolation: None   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)   Code Status: No CPR   Advance Care Planning Activity    Ht: 180 cm (70.87\")   Wt: 83.9 kg (185 lb)    Admission Cmt: None   Principal Problem: None                Active Insurance as of 9/24/2021     Primary Coverage     Payor Plan Insurance Group Employer/Plan Group    MEDICARE MEDICARE A & B      Payor Plan Address Payor Plan Phone Number Payor Plan Fax Number Effective Dates    [address] [phone]  6/1/2011 - None Entered    [facility] [address]       Subscriber Name Subscriber Birth Date Member ID       RAO REYES 1945 [id number]           Secondary Coverage     Payor Plan Insurance Group Employer/Plan Group    [place]      Payor Plan Address Payor Plan Phone Number Payor Plan Fax Number Effective Dates    [address] [phone]  9/28/2017 - None Entered    [place] [address]       Subscriber Name Subscriber Birth Date Member ID       RAO REYES 1945 [id number]                 Emergency Contacts      (Rel.) Home Phone Work Phone Mobile Phone    FRANTZ JARQUIN (Guardian) -- [phone]               History & Physical      Johnathan Sanchez MD at 09/24/21 1700     "          [facility] Medicine Services  HISTORY AND PHYSICAL    Patient Name: Rao Alcazar  : 1945  MRN: [id number]  Primary Care Physician: Provider, No Known  Date of admission: 2021    Subjective   Subjective     Chief Complaint:  Cough and fever    HPI:  Rao Alcazar is a 76 y.o. 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  Patient is not able to provide any history, history is obtained from caregiver at bedside.  Caregiver indicates that the patient began having more difficulty breathing with productive cough and fever overnight.  T-max of 101.5.  Patient additionally noted to be more weak than normal.  Patient has had several aspiration pneumonias in the past and the caregiver was concerned that this is a possibility again so chose to bring him to the ER.  Labs with elevated WBC 18, pro-Octavio 0.41.  Chest x-ray showed enlarged heart with increased pulmonary vascularity bilaterally but no acute parenchymal disease.  CT chest shows patchy groundglass opacity in the perihilar regions bilaterally concerning for infiltrate.  Patient will be admitted to hospital medicine for further evaluation and treatment.    COVID Details:        Symptoms: [] NONE [x] Fever [x]  Cough [] Shortness of breath [] Change in taste or smell  The patient has a COVID HM Topic on their chart, and they are fully vaccinated.    Review of Systems   Unable to obtain due to nonverbal  All other systems reviewed and are negative.     Personal History     Past Medical History:   Diagnosis Date   • Atypical chest pain    • Cardiomegaly    • CHF (congestive heart failure) (CMS/HCC)    • History of panic attacks    • History of seizure disorder    • Mental disorder    • Mental retardation    • Panic attacks    •  Seizures (CMS/HCC)        Past Surgical History:   Procedure Laterality Date   • EXPLORATORY LAPAROTOMY         Family History:  Family history is unknown by patient. Otherwise pertinent FHx was reviewed and unremarkable.     Social History:  reports that he has never smoked. He has never used smokeless tobacco. He reports that he does not drink alcohol and does not use drugs.  Social History     Social History Narrative   • Not on file       Medications:  Calcium Carb-Cholecalciferol, Menthol-Zinc Oxide, Polyethylene Glycol 3350, acetaminophen, aspirin, clonazePAM, hydrophor, levETIRAcetam, levothyroxine, loperamide, mupirocin, phenytoin extended, raNITIdine, sennosides-docusate, simvastatin, and tamsulosin    No Known Allergies    Objective   Objective     Vital Signs:   Temp:  [99.3 °F (37.4 °C)] 99.3 °F (37.4 °C)  Heart Rate:  [68-89] 68  Resp:  [16-22] 18  BP: ()/(53-91) 102/53  Flow (L/min):  [2] 2    Physical Exam   Constitutional: lethargic, chronically ill appearing  Eyes: PERRLA, sclerae anicteric, no conjunctival injection  HENT: NCAT, mucous membranes DRY   Neck: Supple, no thyromegaly, no lymphadenopathy, trachea midline  Respiratory: Bilateral rhonchi with decreased bases, nonlabored respirations on 2lnc   Cardiovascular: RRR, no murmurs, rubs, or gallops, palpable pedal pulses bilaterally  Gastrointestinal: Positive bowel sounds, soft, nontender, nondistended  Musculoskeletal: No bilateral ankle edema, no clubbing or cyanosis to extremities  Psychiatric: ADDIE  Neurologic: CRABTREE spontaneously, doesn't follow commands, nonverbal   Skin: No rashes    Result Review:  I have personally reviewed the results from the time of this admission to 09/24/21 5:00 PM EDT and agree with these findings:  [x]  Laboratory  [x]  Microbiology  [x]  Radiology  [x]  EKG/Telemetry   []  Cardiology/Vascular   []  Pathology  [x]  Old records  []  Other:  Most notable findings include:   Non verbal, very dehydrated  looking    LAB RESULTS:      Lab 09/24/21  1249 09/24/21  1150   WBC  --  18.24*   HEMOGLOBIN  --  15.6   HEMATOCRIT  --  47.2   PLATELETS  --  260   NEUTROS ABS  --  15.15*   IMMATURE GRANS (ABS)  --  0.08*   LYMPHS ABS  --  1.88   MONOS ABS  --  1.09*   EOS ABS  --  0.01   MCV  --  95.4   PROCALCITONIN 0.41*  --    LACTATE  --  1.9         Lab 09/24/21  1249   SODIUM 138   POTASSIUM 4.6   CHLORIDE 99   CO2 29.0   ANION GAP 10.0   BUN 16   CREATININE 0.93   GLUCOSE 162*   CALCIUM 9.0   MAGNESIUM 1.9         Lab 09/24/21  1249   TOTAL PROTEIN 8.0   ALBUMIN 3.70   GLOBULIN 4.3   ALT (SGPT) 7   AST (SGOT) 10   BILIRUBIN 0.3   ALK PHOS 181*         Lab 09/24/21  1249   PROBNP 197.8   TROPONIN T <0.010                 UA    Urinalysis 9/24/21   Specific [place], UA 1.020   Ketones, UA Negative   Blood, UA Negative   Leukocytes, UA Negative   Nitrite, UA Negative           Microbiology Results (last 10 days)     Procedure Component Value - Date/Time    COVID PRE-OP / PRE-PROCEDURE SCREENING ORDER (NO ISOLATION) - Swab, Nasopharynx [[id number]]  (Normal) Collected: 09/24/21 1208    Lab Status: Final result Specimen: Swab from Nasopharynx Updated: 09/24/21 1236    Narrative:      The following orders were created for panel order COVID PRE-OP / PRE-PROCEDURE SCREENING ORDER (NO ISOLATION) - Swab, Nasopharynx.  Procedure                               Abnormality         Status                     ---------                               -----------         ------                     COVID-19 and FLU A/B PCR...[[id number]]  Normal              Final result                 Please view results for these tests on the individual orders.    COVID-19 and FLU A/B PCR - Swab, Nasopharynx [[id number]]  (Normal) Collected: 09/24/21 1208    Lab Status: Final result Specimen: Swab from Nasopharynx Updated: 09/24/21 1236     COVID19 Not Detected     Influenza A PCR Not Detected     Influenza B PCR Not Detected    Narrative:      Fact sheet  for providers: https://www.fda.gov/media/081626/download    Fact sheet for patients: https://www.fda.gov/media/107174/download    Test performed by PCR.          CT Abdomen Pelvis Without Contrast    Result Date: 9/24/2021  EXAMINATION: CT CHEST WO CONTRAST, DIAGNOSTIC-, CT ABDOMEN AND PELVIS WO CONTRAST-09/24/2021:  INDICATION: Sepsis, fever, chest pain.  TECHNIQUE: Multiple axial CT imaging was obtained of the chest, abdomen and pelvis without the administration of intravenous contrast.  The radiation dose reduction device was turned on for each scan per the ALARA (As Low as Reasonably Achievable) protocol.  COMPARISON: NONE.  FINDINGS:  CHEST:  The thyroid is homogeneous. No mediastinal mass or adenopathy. The cardiac chambers are within normal limits.  No pericardial effusion. There is patchy groundglass opacity seen in the perihilar regions bilaterally concerning for infiltrate. There is a tiny left pleural effusion. Degenerative changes seen within the spine.  ABDOMEN: The liver is heterogeneous in appearance. There is calcification seen within the spleen. Old healed granulomatous disease as well seen within the liver. The pancreas is homogeneous. The kidneys and adrenal glands are within normal limits. No abdominal or retroperitoneal lymphadenopathy. No free fluid or free air. There is stool seen scattered diffusely throughout the colon.  PELVIS: The pelvic organs are unremarkable. The pelvic portions of the gastrointestinal tract are within normal limits. No free fluid or free air. No abnormal mass or fluid collection is identified. Degenerative changes seen within the spine and pelvis.      Impression: Increased perihilar markings bilaterally suggesting bilateral infiltrate with small left pleural effusion. The cardiac chambers are enlarged with increased stool seen scattered diffusely throughout the colon. No evidence of obvious obstruction or gross free intraperitoneal air.  D:  09/24/2021 E:  09/24/2021         CT Chest Without Contrast Diagnostic    Result Date: 9/24/2021  EXAMINATION: CT CHEST WO CONTRAST, DIAGNOSTIC-, CT ABDOMEN AND PELVIS WO CONTRAST-09/24/2021:  INDICATION: Sepsis, fever, chest pain.  TECHNIQUE: Multiple axial CT imaging was obtained of the chest, abdomen and pelvis without the administration of intravenous contrast.  The radiation dose reduction device was turned on for each scan per the ALARA (As Low as Reasonably Achievable) protocol.  COMPARISON: NONE.  FINDINGS:  CHEST:  The thyroid is homogeneous. No mediastinal mass or adenopathy. The cardiac chambers are within normal limits.  No pericardial effusion. There is patchy groundglass opacity seen in the perihilar regions bilaterally concerning for infiltrate. There is a tiny left pleural effusion. Degenerative changes seen within the spine.  ABDOMEN: The liver is heterogeneous in appearance. There is calcification seen within the spleen. Old healed granulomatous disease as well seen within the liver. The pancreas is homogeneous. The kidneys and adrenal glands are within normal limits. No abdominal or retroperitoneal lymphadenopathy. No free fluid or free air. There is stool seen scattered diffusely throughout the colon.  PELVIS: The pelvic organs are unremarkable. The pelvic portions of the gastrointestinal tract are within normal limits. No free fluid or free air. No abnormal mass or fluid collection is identified. Degenerative changes seen within the spine and pelvis.      Impression: Increased perihilar markings bilaterally suggesting bilateral infiltrate with small left pleural effusion. The cardiac chambers are enlarged with increased stool seen scattered diffusely throughout the colon. No evidence of obvious obstruction or gross free intraperitoneal air.  D:  09/24/2021 E:  09/24/2021        XR Chest 1 View    Result Date: 9/24/2021  EXAMINATION: XR CHEST 1 VW-09/24/2021:  INDICATION: Weak/Dizzy/AMS, triage protocol.  COMPARISON: NONE.   FINDINGS: Portable chest reveals the heart to be borderline enlarged. Increased pulmonary vascularity bilaterally. No definite pleural effusion or pneumothorax. Degenerative changes seen within the spine. Increased pulmonary vascularity bilaterally.      Impression: The heart is enlarged with increased pulmonary vascularity bilaterally. No evidence of acute parenchymal disease.  D:  09/24/2021 E:  09/24/2021          Results for orders placed in visit on 07/28/16    SCANNED - ECHOCARDIOGRAM      Assessment/Plan   Assessment & Plan       Cardiomegaly    Mental disorder    History of seizure disorder    Hyperlipidemia    Pneumonia of both lower lobes due to infectious organism    Chronic diastolic CHF (congestive heart failure) (HCC)      Aspiration pneumonia  --previously treated for aspiration pneumonia several times per care giver  --currently on modified diet   --CT chest with bilateral infiltrates and tiny left pleural effusion  --Started on IV Zosyn and vancomycin in ED  --MRSA PCR pending, if negative will stop vancomycin  --SLP consultation   --NPO tonight     Seizure disorder  --Continue Keppra and Dilantin  --last seizure was one month ago  --follows with Dr Fulton     Diastolic heart failure  Cardiomegaly  --ASA    Hyperlipidemia  --Continue statin    DVT prophylaxis:  Heparin     CODE STATUS:    Level Of Support Discussed With: Health Care Surrogate  Code Status: CPR  Medical Interventions (Level of Support Prior to Arrest): Full      This note has been completed as part of a split-shared workflow.   Signature: Electronically signed by MONIKA Zaragoza, 09/24/21, 5:29 PM EDT.    Attending   Admission Attestation       I have seen and examined the patient, performing an independent face-to-face diagnostic evaluation with plan of care reviewed and developed with the advanced practice clinician (APC).      Brief Summary Statement:   Rao Alcazar is a 76 y.o. 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    He is non verbal and unable to provide history.  His caregiver is in the ER and can provide some but limited history.  He looks dehydrated and chronically ill and is tachycardic in the ER on my visit.   He breathes with his mouth wide open.    Remainder of detailed HPI is as noted by APC and has been reviewed and/or edited by me for completeness.    Attending Physical Exam:    Constitutional: sleeping, chronically ill appearing  Eyes: PERRLA, sclerae anicteric, no conjunctival injection  HENT: NCAT, dry tongue  Neck: Supple, no JVD, trachea midline  Respiratory: Clear to auscultation bilaterally, nonlabored respirations   Cardiovascular: tachy, s1 and s2  Gastrointestinal: Positive bowel sounds, soft, nontender, nondistended  Musculoskeletal: No bilateral ankle edema, no clubbing or cyanosis to extremities  Psychiatric: flat affect, not communicating  Skin: dry, + skin tenting    Brief Assessment/Plan :  See detailed assessment and plan developed with APC which I have reviewed and/or edited for completeness.      Admission Status: I believe that this patient meets INPATIENT status due to fever of unclear etiology and probable aspiration.  I feel patient’s risk for adverse outcomes and need for care warrant INPATIENT evaluation and I predict the patient’s care encounter to likely last beyond 2 midnights.        Johnathan Sanchez MD  21                          Electronically signed by Johnathan Sanchez MD at 21 2211            Physician Progress Notes (last 48 hours)      Elisabet Bazzi DO at 10/14/21 1444              [facility] Medicine Services  PROGRESS NOTE    Patient Name: Rao Alcazar  : 1945  MRN: [id number]    Date of Admission: 2021  Primary Care Physician: Provider, No Known    Subjective   Subjective     CC:  F/U dysphagia      HPI:  Patient seen and examined.  Nursing notes reviewed.  No acute events overnight.  Patient can answer yes or no to some of my questions but his speech is hard to understand.  He does ask when he can go home.  He is currently waiting on placement.  His tray is by his bedside and appears he ate everything.    ROS:  UTO reliable ROS    Objective   Objective     Vital Signs:   Temp:  [98.1 °F (36.7 °C)-98.5 °F (36.9 °C)] 98.1 °F (36.7 °C)  Heart Rate:  [73-83] 73  Resp:  [17-18] 17  BP: (107-132)/(55-56) 107/55     Physical Exam:  Constitutional: No acute distress, awake, alert  HENT: NCAT, mucous membranes moist  Respiratory: Clear to auscultation bilaterally, respiratory effort normal   Cardiovascular: RRR, no murmurs, rubs, or gallops  Gastrointestinal: Positive bowel sounds, soft, nontender, nondistended  Musculoskeletal: No bilateral ankle edema  Psychiatric: Appropriate affect, cooperative  Neurologic: Speech difficult to understand, CRABTREE  Skin: No rashes    Results Reviewed:  LAB RESULTS:          Lab 10/11/21  0521   SODIUM 136   POTASSIUM 4.2   CHLORIDE 100   CO2 27.0   ANION GAP 9.0   BUN 7*   CREATININE 0.44*   GLUCOSE 100*   CALCIUM 9.1   MAGNESIUM 1.9   PHOSPHORUS 2.7         Lab 10/11/21  0521   TOTAL PROTEIN 7.9   ALBUMIN 3.70   ALT (SGPT) 26   AST (SGOT) 26   BILIRUBIN 0.2   ALK PHOS 143*             Lab 10/11/21  0521   CHOLESTEROL 148   TRIGLYCERIDES 177*             Brief Urine Lab Results  (Last result in the past 365 days)      Color   Clarity   Blood   Leuk Est   Nitrite   Protein   CREAT   Urine HCG        09/24/21 1203 Yellow   Clear   Negative   Negative   Negative   Negative                 Microbiology Results Abnormal     Procedure Component Value - Date/Time    Blood Culture - Blood, Arm, Right [[id number]] Collected: 09/24/21 1150    Lab Status: Final result Specimen: Blood from Arm, Right Updated: 09/29/21 1231     Blood Culture No growth at 5 days    Blood Culture - Blood, Arm, Left  [[id number]] Collected: 09/24/21 1150    Lab Status: Final result Specimen: Blood from Arm, Left Updated: 09/29/21 1231     Blood Culture No growth at 5 days    COVID PRE-OP / PRE-PROCEDURE SCREENING ORDER (NO ISOLATION) - Swab, Nasopharynx [[id number]]  (Normal) Collected: 09/24/21 1208    Lab Status: Final result Specimen: Swab from Nasopharynx Updated: 09/24/21 1236    Narrative:      The following orders were created for panel order COVID PRE-OP / PRE-PROCEDURE SCREENING ORDER (NO ISOLATION) - Swab, Nasopharynx.  Procedure                               Abnormality         Status                     ---------                               -----------         ------                     COVID-19 and FLU A/B PCR...[[id number]]  Normal              Final result                 Please view results for these tests on the individual orders.    COVID-19 and FLU A/B PCR - Swab, Nasopharynx [[id number]]  (Normal) Collected: 09/24/21 1208    Lab Status: Final result Specimen: Swab from Nasopharynx Updated: 09/24/21 1236     COVID19 Not Detected     Influenza A PCR Not Detected     Influenza B PCR Not Detected    Narrative:      Fact sheet for providers: https://www.fda.gov/media/454629/download    Fact sheet for patients: https://www.fda.gov/media/248779/download    Test performed by PCR.          No radiology results from the last 24 hrs    Results for orders placed during the hospital encounter of 09/24/21    Adult Transthoracic Echo Complete W/ Cont if Necessary Per Protocol    Interpretation Summary  · Normal left ventricular systolic function, estimated EF 60%.  · Aortic sclerosis without aortic stenosis.  · Trace aortic insufficiency.  · Trace mitral regurgitation, trace tricuspid regurgitation, normal RVSP.      I have reviewed the medications:  Scheduled Meds:aspirin, 81 mg, Oral, Daily   Or  aspirin, 300 mg, Rectal, Daily  atorvastatin, 10 mg, Oral, Nightly  castor oil-balsam peru, 1 application, Topical, Q12H  heparin  (porcine), 5,000 Units, Subcutaneous, Q8H  levETIRAcetam, 2,000 mg, Oral, Q12H  levothyroxine, 25 mcg, Oral, Q AM  palliative care oral rinse, , Mouth/Throat, TID - RT  phenytoin, 125 mg, Oral, TID  senna-docusate sodium, 2 tablet, Oral, BID      Continuous Infusions:   PRN Meds:.•  acetaminophen  •  senna-docusate sodium **AND** polyethylene glycol **AND** bisacodyl **AND** bisacodyl    Assessment/Plan   Assessment & Plan     Active Hospital Problems    Diagnosis  POA   • Pneumonia of both lower lobes due to infectious organism [J18.9]  Yes   • Chronic diastolic CHF (congestive heart failure) (HCC) [I50.32]  Unknown   • Hyperlipidemia [E78.5]  Yes   • Mental disorder [F99]  Yes   • History of seizure disorder [Z86.69]  Not Applicable   • Cardiomegaly [I51.7]  Yes      Resolved Hospital Problems   No resolved problems to display.        Brief Hospital Course to date:  Rao Alcazar is a 76 y.o. male patient with severe intellectual disability, nonverbal at baseline (guardian of state), with history of dyslipidemia, seizure disorder and chronic hypoxic respiratory failure on home oxygen at 2 L/min presented to the hospital with fever and cough and was found to have bilateral basal pneumonia.     This patient's problems and plans were partially entered by my partner and updated as appropriate by me 10/14/21.  All problems are new to me today      GOC  -Pt DNR/DNI. He is on a comfort diet. Plan is LTC with hospice.     Aspiration pneumonia  Dysphagia   -CT chest showed bilateral pulmonary infiltrates and pleural effusion, concerning for community acquired pneumonia  -Has completed course of IV abx.  -Continue comfort diet.     Seizure disorder  -Continue Keppra and Dilantin  -Seizure precautions     Diastolic heart failure, not in decompensation  -echo EF 60%, aortic stenosis, normal RVSP   -Compensated. Continue aspirin     Hyperlipidemia  -Continue statin     Severe intellectual disability   --Stable to  baseline    DVT prophylaxis:  Medical DVT prophylaxis orders are present.       AM-PAC 6 Clicks Score (PT): 6 (10/13/21 2008)    Disposition: I expect the patient to be discharged to LTC with hospice when arranged.     CODE STATUS:   Code Status and Medical Interventions:   Ordered at: 10/05/21 1420     Code Status:    No CPR     Medical Interventions (Level of Support Prior to Arrest):    Comfort Measures       Elisabet Bazzi DO  10/14/21                Electronically signed by Elisabet Bazzi DO at 10/14/21 1451     El Vázquez DO at 10/13/21 1407        Goals are set and symptoms are managed.  Palliative medicine will sign off.    El Vázquez DO  14:08 EDT  10/13/21    Electronically signed by El Vázquez DO at 10/13/21 1408     Payton Stallings APRN at 10/13/21 1257              [facility] Medicine Services  PROGRESS NOTE    Patient Name: Rao Alcazar  : 1945  MRN: [id number]    Date of Admission: 2021  Primary Care Physician: Provider, No Known    Subjective   Subjective   CC: f/u dysphagia    HPI:   Patient lying in bed and can respond to some questions, but speech is very difficult to understand.  Patient is unable to follow any commands, but is in no distress.  Awaiting placement.      ROS:  UTO due to mental disability     Objective   Objective   Vital Signs:   Temp:  [98.2 °F (36.8 °C)-98.3 °F (36.8 °C)] 98.3 °F (36.8 °C)  Heart Rate:  [82-89] 82  Resp:  [17-18] 17  BP: ()/(60-75) 98/60  Physical Exam:  Constitutional: Alert, thin ill-appearing male sitting up in bed in NAD  Eyes: EOMI, sclerae anicteric, no conjunctival injection  Head: NCAT  ENT: Malin, moist mucous membranes   Respiratory: Nonlabored, symmetrical chest expansion, CTAB  Cardiovascular: RRR, no M/R/G, +DP pulses bilaterally  Gastrointestinal: Soft, NT, ND +BS  Musculoskeletal: CRABTREE; no LE edema bilaterally  Neurologic: Alert but unable to determine orientation, strength  symmetric in all extremities, does not follow commands, speech difficult to underestand  Skin: No rashes on exposed skin  Psychiatric: Pleasant and cooperative; flat affect    Results Reviewed:  LAB RESULTS:          Lab 10/11/21  0521   SODIUM 136   POTASSIUM 4.2   CHLORIDE 100   CO2 27.0   ANION GAP 9.0   BUN 7*   CREATININE 0.44*   GLUCOSE 100*   CALCIUM 9.1   MAGNESIUM 1.9   PHOSPHORUS 2.7         Lab 10/11/21  0521   TOTAL PROTEIN 7.9   ALBUMIN 3.70   ALT (SGPT) 26   AST (SGOT) 26   BILIRUBIN 0.2   ALK PHOS 143*             Lab 10/11/21  0521   CHOLESTEROL 148   TRIGLYCERIDES 177*             Brief Urine Lab Results  (Last result in the past 365 days)      Color   Clarity   Blood   Leuk Est   Nitrite   Protein   CREAT   Urine HCG        09/24/21 1203 Yellow   Clear   Negative   Negative   Negative   Negative                 Microbiology Results Abnormal     Procedure Component Value - Date/Time    Blood Culture - Blood, Arm, Right [[id number]] Collected: 09/24/21 1150    Lab Status: Final result Specimen: Blood from Arm, Right Updated: 09/29/21 1231     Blood Culture No growth at 5 days    Blood Culture - Blood, Arm, Left [[id number]] Collected: 09/24/21 1150    Lab Status: Final result Specimen: Blood from Arm, Left Updated: 09/29/21 1231     Blood Culture No growth at 5 days    COVID PRE-OP / PRE-PROCEDURE SCREENING ORDER (NO ISOLATION) - Swab, Nasopharynx [[id number]]  (Normal) Collected: 09/24/21 1208    Lab Status: Final result Specimen: Swab from Nasopharynx Updated: 09/24/21 1236    Narrative:      The following orders were created for panel order COVID PRE-OP / PRE-PROCEDURE SCREENING ORDER (NO ISOLATION) - Swab, Nasopharynx.  Procedure                               Abnormality         Status                     ---------                               -----------         ------                     COVID-19 and FLU A/B PCR...[[id number]]  Normal              Final result                 Please view results  for these tests on the individual orders.    COVID-19 and FLU A/B PCR - Swab, Nasopharynx [[id number]]  (Normal) Collected: 09/24/21 1208    Lab Status: Final result Specimen: Swab from Nasopharynx Updated: 09/24/21 1236     COVID19 Not Detected     Influenza A PCR Not Detected     Influenza B PCR Not Detected    Narrative:      Fact sheet for providers: https://www.fda.gov/media/658853/download    Fact sheet for patients: https://www.fda.gov/media/536886/download    Test performed by PCR.        CXR personally reviewed showing bilateral opacities. Agree with interpretation.    Results for orders placed during the hospital encounter of 09/24/21    Adult Transthoracic Echo Complete W/ Cont if Necessary Per Protocol    Interpretation Summary  · Normal left ventricular systolic function, estimated EF 60%.  · Aortic sclerosis without aortic stenosis.  · Trace aortic insufficiency.  · Trace mitral regurgitation, trace tricuspid regurgitation, normal RVSP.      I have reviewed the medications:  Scheduled Meds:aspirin, 81 mg, Oral, Daily   Or  aspirin, 300 mg, Rectal, Daily  atorvastatin, 10 mg, Oral, Nightly  castor oil-balsam peru, 1 application, Topical, Q12H  heparin (porcine), 5,000 Units, Subcutaneous, Q8H  levETIRAcetam, 2,000 mg, Oral, Q12H  levothyroxine, 25 mcg, Oral, Q AM  palliative care oral rinse, , Mouth/Throat, TID - RT  phenytoin, 125 mg, Oral, TID  senna-docusate sodium, 2 tablet, Oral, BID      Continuous Infusions:   PRN Meds:.•  acetaminophen  •  senna-docusate sodium **AND** polyethylene glycol **AND** bisacodyl **AND** bisacodyl    Assessment/Plan   Assessment & Plan     Active Hospital Problems    Diagnosis  POA   • Pneumonia of both lower lobes due to infectious organism [J18.9]  Yes   • Chronic diastolic CHF (congestive heart failure) (HCC) [I50.32]  Unknown   • Hyperlipidemia [E78.5]  Yes   • Mental disorder [F99]  Yes   • History of seizure disorder [Z86.69]  Not Applicable   • Cardiomegaly  "[I51.7]  Yes      Resolved Hospital Problems   No resolved problems to display.     Brief Hospital Course to date:  Rao Aclazar is a 76 y.o. male patient with severe intellectual disability, nonverbal at baseline (guardian of state), with history of dyslipidemia, seizure disorder and chronic hypoxic respiratory failure on home oxygen at 2 L/min presented to the hospital with fever and cough and was found to have bilateral basal pneumonia. This is my first day assessing patient's active medical issues.    GOC  -My partner prior had d/w patient's family. This patient suffers from chronic conditions such as seizure disorder, cognitive delay and heart failure. DNR/DNI is appropriate as this would only prolong suffering and would most likely not be successful -guardian in agreement - changed to DNR/DNI.  -Most currently this patient has been diagnosed with severe dysphagia. He has been receiving nutrition via an NG tube which is temporary but he has pulled at this tube and continues trying to push it out with his mouth. His sitter, whom knows him well, states that he \"lives to eat\". A comfort diet along with comfort measures would be most appropriate as a PEG placement and any further aggressive treatment would only prolong suffering. He is progressive in his chronic conditions with no improvement foreseen.   --Patient moved 5 feet due to difficult placement and no need for monitoring     Aspiration pneumonia  Dysphagia   -CT chest showed bilateral pulmonary infiltrates and pleural effusion, concerning for community acquired pneumonia  -Has completed course of IV abx.  -Continue comfort diet.  -CM/SW following for disposition, looking for LTC facility  --AM labs unremarkable     Seizure disorder  -Keppra and Dilantin  -Seizure precautions, stable     Elevated alk phos, trending down    Diastolic heart failure, not in decompensation  -echo EF 60%, aortic stenosis, normal RVSP   -Compensated. Continue " aspirin     Hyperlipidemia  -Continue statin     Severe intellectual disability   Nonverbal   --Stable to baseline    DVT prophylaxis:  Medical DVT prophylaxis orders are present.       AM-PAC 6 Clicks Score (PT): 6 (10/13/21 0800)    Disposition: I expect the patient to be discharged TBD.  Pending CM finding a facility for long term care and hospice; SW now involved for difficult placement    CODE STATUS:   Code Status and Medical Interventions:   Ordered at: 10/05/21 1420     Code Status:    No CPR     Medical Interventions (Level of Support Prior to Arrest):    Comfort Measures       MONIKA Morales  10/13/21              Electronically signed by Payton Stallings APRN at 10/13/21 1403          Physical Therapy Notes (most recent note)      Sury Latham, PT at 21 1442  Version 2 of 2         Patient Name: Rao Alcazar  : 1945    MRN: [id number]                              Today's Date: 2021       Admit Date: 2021    Visit Dx:     ICD-10-CM ICD-9-CM   1. Pneumonia of both lower lobes due to infectious organism  J18.9 486   2. Acute on chronic respiratory failure with hypoxia (HCC)  J96.21 518.84     799.02     Patient Active Problem List   Diagnosis   • Atypical chest pain   • Cardiomegaly   • Congestive heart failure (CMS/HCC)   • Shortness of breath   • Cardiac mass   • Chest pain   • Mental disorder   • History of panic attacks   • History of seizure disorder   • Mental retardation   • Hyperlipidemia   • Pneumonia of both lower lobes due to infectious organism   • Chronic diastolic CHF (congestive heart failure) (HCC)     Past Medical History:   Diagnosis Date   • Atypical chest pain    • Cardiomegaly    • CHF (congestive heart failure) (CMS/HCC)    • History of panic attacks    • History of seizure disorder    • Mental disorder    • Mental retardation    • Panic attacks    • Seizures (CMS/HCC)      Past Surgical History:   Procedure Laterality Date   • EXPLORATORY LAPAROTOMY    "    General Information     Row Name 09/26/21 1525          Physical Therapy Time and Intention    Document Type  discharge evaluation/summary  -CD     Mode of Treatment  physical therapy  -CD     Row Name 09/26/21 1525          General Information    Patient Profile Reviewed  yes  -CD     Prior Level of Function  dependent:;transfer;bed mobility;ADL's SPOKE WITH CAREGIVER, NICKO, WHO REPORTS PT IS MOSTLY NONVERBAL AND DEPENDENT WITH ADLS/MOBILITY WITH USE OF FELICIA LIFT FOR UP TO W/C.  -CD     Existing Precautions/Restrictions  fall ??ASPIRATION PNA, NPO.  -CD     Barriers to Rehab  medically complex;previous functional deficit;cognitive status  -CD     Row Name 09/26/21 1525          Living Environment    Lives With  -- PT IS A [facility]ODS OF THE [facility] AND HAS 24/7 CAREGIVERS.  -CD     Row Name 09/26/21 1525          Cognition    Orientation Status (Cognition)  oriented to;person PT ATTEMPTED TO SAY NAME \"SAM\", OTHERWISE RESPONDED \"YEAH\" TO EVERYTHING.  -CD     Row Name 09/26/21 1525          Safety Issues, Functional Mobility    Safety Issues Affecting Function (Mobility)  ability to follow commands;awareness of need for assistance;insight into deficits/self-awareness;safety precaution awareness;sequencing abilities  -CD     Impairments Affecting Function (Mobility)  balance;cognition;strength;motor control  -CD     Cognitive Impairments, Mobility Safety/Performance  attention;awareness, need for assistance;insight into deficits/self-awareness;problem-solving/reasoning;sequencing abilities  -CD     Comment, Safety Issues/Impairments (Mobility)  ONLY FOLLOWED COMMAND TO LIFT FOOT TO DON SOCKS, STRAIGHTEN LEGS AND RAISE L UE. NOT TRACKING CONSISTENTLY IN ROOM.  -CD       User Key  (r) = Recorded By, (t) = Taken By, (c) = Cosigned By    Initials Name Provider Type    CD Sury Latham, PT Physical Therapist        Mobility     Row Name 09/26/21 1531          Bed Mobility    Bed Mobility  rolling left;rolling right  -CD  "    Rolling Left [place] (Bed Mobility)  dependent (less than 25% patient effort);2 person assist  -CD     Rolling Right [place] (Bed Mobility)  dependent (less than 25% patient effort);2 person assist  -CD     Assistive Device (Bed Mobility)  draw sheet  -CD     Comment (Bed Mobility)  PT UNABLE TO ASSIST WITH ROLLING DESPITE MAX CUES. ROLLED L/R FOR PLACEMENT OF MECHANICAL LIFT SLING.  -CD     Row Name 09/26/21 1531          Transfers    Comment (Transfers)  LIFT BED TO CHAIR.  -CD     Row Name 09/26/21 1531          Bed-Chair Transfer    Bed-Chair [place] (Transfers)  dependent (less than 25% patient effort);2 person assist  -CD     Row Name 09/26/21 1531          Gait/Stairs (Locomotion)    Comment (Gait/Stairs)  PT IS NONAMBULATORY AT BASELINE PER CG.  -CD       User Key  (r) = Recorded By, (t) = Taken By, (c) = Cosigned By    Initials Name Provider Type    Sury Alston PT Physical Therapist        Obj/Interventions     Row Name 09/26/21 1532          Range of Motion Comprehensive    General Range of Motion  bilateral upper extremity ROM WFL;bilateral lower extremity ROM WFL  -CD     Comment, General Range of Motion  ALL ROM PASSIVE, AS PT NOT FOLLOWING COMMANDS AND NO PURPOSEFUL MOVEMENT EXCEPT LIFTING LE'S TO KATHY SOCKS.  -CD     Row Name 09/26/21 1532          Strength Comprehensive (MMT)    General Manual Muscle Testing (MMT) Assessment  lower extremity strength deficits identified;upper extremity strength deficits identified  -CD     Comment, General Manual Muscle Testing (MMT) Assessment  GENERALIZED WEAKNESS. LIFTED L UE AND B LE FROM EOB PARTIAL RANGE.  -CD     Row Name 09/26/21 1532          Motor Skills    Therapeutic Exercise  -- PROM B LE'S- 1 SET OF 10 REPS- AP'S,  LAQ, HIP FLEX.  -CD       User Key  (r) = Recorded By, (t) = Taken By, (c) = Cosigned By    Initials Name Provider Type    Sury Alston PT Physical Therapist        Goals/Plan    No documentation.       Clinical  Impression     Row Name 09/26/21 1538          Pain    Additional Documentation  Pain Scale: FACES Pre/Post-Treatment (Group)  -CD     Row Name 09/26/21 153          Pain Scale: FACES Pre/Post-Treatment    Pain: FACES Scale, Pretreatment  0-->no hurt  -CD     Posttreatment Pain Rating  0-->no hurt  -CD     Pre/Posttreatment Pain Comment  NAD WITH ROLLING IN BED, ROM ASSESSMENT.  -CD     Row Name 09/26/21 1539          Plan of Care Review    Plan of Care Reviewed With  patient  -CD     Outcome Summary  GOALS NOT ESTABLISHED AS PT APPEARS AT BASELINE WITH FUNCTIONAL MOBILITY. PER CG PT IS DEPENDENT WITH ADL'S AND UTILIZES FELICIA LIFT BED TO W/C AT HOME. WILL DEFER TO NSG FOR CONTINUED LIFT TO RECLINER WITH MECHANICAL LIFT SYSTEM, ROM OF EXTREMITES DURING BATHING AND POSTIONING FOR COMFORT, PRESSURE RELIEF AND EDEMA REDUCTION. PT IS NOT APPROPRIATE FOR ONGOING SKILLED P.T.  -CD     Row Name 09/26/21 153          Therapy Assessment/Plan (PT)    Patient/Family Therapy Goals Statement (PT)  UNABLE TO STATE.  -CD     Criteria for Skilled Interventions Met (PT)  no;does not meet criteria for skilled intervention  -CD     Row Name 09/26/21 1533          Vital Signs    Post Systolic BP Rehab  121  -CD     Post Treatment Diastolic BP  49  -CD     Posttreatment Heart Rate (beats/min)  62  -CD     Pre SpO2 (%)  98  -CD     O2 Delivery Pre Treatment  supplemental O2 4L  -CD     O2 Delivery Intra Treatment  supplemental O2  -CD     O2 Delivery Post Treatment  supplemental O2  -CD     Pre Patient Position  Supine  -CD     Intra Patient Position  Supine  -CD     Post Patient Position  Sitting  -CD     Row Name 09/26/21 1533          Positioning and Restraints    Pre-Treatment Position  in bed  -CD     Post Treatment Position  chair  -CD     In Chair  reclined;call light within reach;encouraged to call for assist;exit alarm on;waffle cushion;on mechanical lift sling;notified nsg;RUE elevated;LUE elevated;legs elevated;L waffle boot   -CD       User Key  (r) = Recorded By, (t) = Taken By, (c) = Cosigned By    Initials Name Provider Type    CD Sury Latham PT Physical Therapist        Outcome Measures     Row Name 09/26/21 1542          How much help from another person do you currently need...    Turning from your back to your side while in flat bed without using bedrails?  1  -CD     Moving from lying on back to sitting on the side of a flat bed without bedrails?  1  -CD     Moving to and from a bed to a chair (including a wheelchair)?  1  -CD     Standing up from a chair using your arms (e.g., wheelchair, bedside chair)?  1  -CD     Climbing 3-5 steps with a railing?  1  -CD     To walk in hospital room?  1  -CD     AM-PAC 6 Clicks Score (PT)  6  -CD       User Key  (r) = Recorded By, (t) = Taken By, (c) = Cosigned By    Initials Name Provider Type    CD Sury Latham PT Physical Therapist        Physical Therapy Education                 Title: PT OT SLP Therapies (In Progress)     Topic: Physical Therapy (In Progress)     Point: Precautions (In Progress)     Learning Progress Summary           Patient Nonacceptance, E,D, NL by CD at 9/26/2021 1542    Comment: BENEFITS OF OOB ACTIVITY.                               User Key     Initials Effective Dates Name Provider Type Discipline    CD 06/16/21 -  Sury Latham PT Physical Therapist PT              PT Recommendation and Plan     Plan of Care Reviewed With: patient  Outcome Summary: GOALS NOT ESTABLISHED AS PT APPEARS AT BASELINE WITH FUNCTIONAL MOBILITY. PER CG PT IS DEPENDENT WITH ADL'S AND UTILIZES FELICIA LIFT BED TO W/C AT HOME. WILL DEFER TO NSG FOR CONTINUED LIFT TO RECLINER WITH MECHANICAL LIFT SYSTEM, ROM OF EXTREMITES DURING BATHING AND POSTIONING FOR COMFORT, PRESSURE RELIEF AND EDEMA REDUCTION. PT IS NOT APPROPRIATE FOR ONGOING SKILLED P.T.     Time Calculation:   PT Charges     Row Name 09/26/21 1543             Time Calculation    Start Time  1442  -CD      PT Received On   21  -CD         Untimed Charges    PT Eval/Re-eval Minutes  64  -CD         Total Minutes    Untimed Charges Total Minutes  64  -CD       Total Minutes  64  -CD        User Key  (r) = Recorded By, (t) = Taken By, (c) = Cosigned By    Initials Name Provider Type    CD Sury Latham, PT Physical Therapist        Therapy Charges for Today     Code Description Service Date Service Provider Modifiers Qty    41294810904 HC PT EVAL LOW COMPLEXITY 4 2021 Sury Latham, PT GP 1    58860330187 HC PT THER SUPP EA 15 MIN 2021 Sury Latham, PT GP 2          PT G-Codes  AM-PAC 6 Clicks Score (PT): 6    PT Discharge Summary  Anticipated Discharge Disposition (PT): home with  care    Sury Latham, PT  2021         Electronically signed by Sury Latham, PT at 21 1637     Sury Latham, PT at 21 1442  Version 1 of 2         Patient Name: Rao Alcazar  : 1945    MRN: [id number]                              Today's Date: 2021       Admit Date: 2021    Visit Dx:     ICD-10-CM ICD-9-CM   1. Pneumonia of both lower lobes due to infectious organism  J18.9 486   2. Acute on chronic respiratory failure with hypoxia (HCC)  J96.21 518.84     799.02     Patient Active Problem List   Diagnosis   • Atypical chest pain   • Cardiomegaly   • Congestive heart failure (CMS/HCC)   • Shortness of breath   • Cardiac mass   • Chest pain   • Mental disorder   • History of panic attacks   • History of seizure disorder   • Mental retardation   • Hyperlipidemia   • Pneumonia of both lower lobes due to infectious organism   • Chronic diastolic CHF (congestive heart failure) (HCC)     Past Medical History:   Diagnosis Date   • Atypical chest pain    • Cardiomegaly    • CHF (congestive heart failure) (CMS/HCC)    • History of panic attacks    • History of seizure disorder    • Mental disorder    • Mental retardation    • Panic attacks    • Seizures (CMS/HCC)      Past Surgical History:   Procedure  "Laterality Date   • EXPLORATORY LAPAROTOMY       General Information     Row Name 09/26/21 1525          Physical Therapy Time and Intention    Document Type  discharge evaluation/summary  -CD     Mode of Treatment  physical therapy  -CD     Row Name 09/26/21 1525          General Information    Patient Profile Reviewed  yes  -CD     Prior Level of Function  dependent:;transfer;bed mobility;ADL's SPOKE WITH CAREGIVER, NICKO, WHO REPORTS PT IS MOSTLY NONVERBAL AND DEPENDENT WITH ADLS/MOBILITY WITH USE OF FELICIA LIFT FOR UP TO W/C.  -CD     Existing Precautions/Restrictions  fall ??ASPIRATION PNA, NPO.  -CD     Barriers to Rehab  medically complex;previous functional deficit;cognitive status  -CD     Row Name 09/26/21 1522          Living Environment    Lives With  -- PT IS A [facility] OF THE [facility] AND HAS 24/7 CAREGIVERS.  -CD     Row Name 09/26/21 1521          Cognition    Orientation Status (Cognition)  oriented to;person PT ATTEMPTED TO SAY NAME \"SAM\", OTHERWISE RESPONDED \"YEAH\" TO EVERYTHING.  -CD     Row Name 09/26/21 1520          Safety Issues, Functional Mobility    Safety Issues Affecting Function (Mobility)  ability to follow commands;awareness of need for assistance;insight into deficits/self-awareness;safety precaution awareness;sequencing abilities  -CD     Impairments Affecting Function (Mobility)  balance;cognition;strength;motor control  -CD     Cognitive Impairments, Mobility Safety/Performance  attention;awareness, need for assistance;insight into deficits/self-awareness;problem-solving/reasoning;sequencing abilities  -CD     Comment, Safety Issues/Impairments (Mobility)  ONLY FOLLOWED COMMAND TO LIFT FOOT TO DON SOCKS, STRAIGHTEN LEGS AND RAISE L UE. NOT TRACKING CONSISTENTLY IN ROOM.  -CD       User Key  (r) = Recorded By, (t) = Taken By, (c) = Cosigned By    Initials Name Provider Type    CD Sury Latham PT Physical Therapist        Mobility     Row Name 09/26/21 1531          Bed Mobility    " Bed Mobility  rolling left;rolling right  -CD     Rolling Left [place] (Bed Mobility)  dependent (less than 25% patient effort);2 person assist  -CD     Rolling Right [place] (Bed Mobility)  dependent (less than 25% patient effort);2 person assist  -CD     Assistive Device (Bed Mobility)  draw sheet  -CD     Comment (Bed Mobility)  PT UNABLE TO ASSIST WITH ROLLING DESPITE MAX CUES. ROLLED L/R FOR PLACEMENT OF MECHANICAL LIFT SLING.  -CD     Row Name 09/26/21 1531          Transfers    Comment (Transfers)  LIFT BED TO CHAIR.  -CD     Row Name 09/26/21 1531          Bed-Chair Transfer    Bed-Chair [place] (Transfers)  dependent (less than 25% patient effort);2 person assist  -CD     Row Name 09/26/21 1531          Gait/Stairs (Locomotion)    Comment (Gait/Stairs)  PT IS NONAMBULATORY AT BASELINE PER CG.  -CD       User Key  (r) = Recorded By, (t) = Taken By, (c) = Cosigned By    Initials Name Provider Type    Sury Alston PT Physical Therapist        Obj/Interventions     Row Name 09/26/21 1532          Range of Motion Comprehensive    General Range of Motion  bilateral upper extremity ROM WFL;bilateral lower extremity ROM WFL  -CD     Comment, General Range of Motion  ALL ROM PASSIVE, AS PT NOT FOLLOWING COMMANDS AND NO PURPOSEFUL MOVEMENT EXCEPT LIFTING LE'S TO KATHY SOCKS.  -     Row Name 09/26/21 1532          Strength Comprehensive (MMT)    General Manual Muscle Testing (MMT) Assessment  lower extremity strength deficits identified;upper extremity strength deficits identified  -CD     Comment, General Manual Muscle Testing (MMT) Assessment  GENERALIZED WEAKNESS. LIFTED L UE AND B LE FROM EOB PARTIAL RANGE.  -     Row Name 09/26/21 1532          Motor Skills    Therapeutic Exercise  -- PROM B LE'S- 1 SET OF 10 REPS- AP'S,  LAQ, HIP FLEX.  -CD       User Key  (r) = Recorded By, (t) = Taken By, (c) = Cosigned By    Initials Name Provider Type    Sury Alston PT Physical Therapist         Goals/Plan    No documentation.       Clinical Impression     Row Name 09/26/21 1538          Pain    Additional Documentation  Pain Scale: FACES Pre/Post-Treatment (Group)  -CD     Row Name 09/26/21 1539          Pain Scale: FACES Pre/Post-Treatment    Pain: FACES Scale, Pretreatment  0-->no hurt  -CD     Posttreatment Pain Rating  0-->no hurt  -CD     Pre/Posttreatment Pain Comment  NAD WITH ROLLING IN BED, ROM ASSESSMENT.  -CD     Row Name 09/26/21 1535          Plan of Care Review    Plan of Care Reviewed With  patient  -CD     Outcome Summary  GOALS NOT ESTABLISHED AS PT APPEARS AT BASELINE WITH FUNCTIONAL MOBILITY. PER CG PT IS DEPENDENT WITH ADL'S AND UTILIZES FELICIA LIFT BED TO W/C AT HOME. WILL DEFER TO NSG FOR CONTINUED LIFT TO RECLINER WITH MECHANICAL LIFT SYSTEM, ROM OF EXTREMITES DURING BATHING AND POSTIONING FOR COMFORT, PRESSURE RELIEF AND EDEMA REDUCTION. PT IS NOT APPROPRIATE FOR ONGOING SKILLED P.T.  -CD     Row Name 09/26/21 1533          Therapy Assessment/Plan (PT)    Patient/Family Therapy Goals Statement (PT)  UNABLE TO STATE.  -CD     Criteria for Skilled Interventions Met (PT)  no;does not meet criteria for skilled intervention  -CD     Row Name 09/26/21 1537          Vital Signs    Post Systolic BP Rehab  121  -CD     Post Treatment Diastolic BP  49  -CD     Posttreatment Heart Rate (beats/min)  62  -CD     Pre SpO2 (%)  98  -CD     O2 Delivery Pre Treatment  supplemental O2 4L  -CD     O2 Delivery Intra Treatment  supplemental O2  -CD     O2 Delivery Post Treatment  supplemental O2  -CD     Pre Patient Position  Supine  -CD     Intra Patient Position  Supine  -CD     Post Patient Position  Sitting  -CD     Row Name 09/26/21 1530          Positioning and Restraints    Pre-Treatment Position  in bed  -CD     Post Treatment Position  chair  -CD     In Chair  reclined;call light within reach;encouraged to call for assist;exit alarm on;waffle cushion;on mechanical lift sling;notified nsg;FIDELIA  elevated;LUE elevated;legs elevated;L waffle boot  -CD       User Key  (r) = Recorded By, (t) = Taken By, (c) = Cosigned By    Initials Name Provider Type    CD Sury Latham PT Physical Therapist        Outcome Measures     Row Name 09/26/21 1542          How much help from another person do you currently need...    Turning from your back to your side while in flat bed without using bedrails?  1  -CD     Moving from lying on back to sitting on the side of a flat bed without bedrails?  1  -CD     Moving to and from a bed to a chair (including a wheelchair)?  1  -CD     Standing up from a chair using your arms (e.g., wheelchair, bedside chair)?  1  -CD     Climbing 3-5 steps with a railing?  1  -CD     To walk in hospital room?  1  -CD     AM-PAC 6 Clicks Score (PT)  6  -CD       User Key  (r) = Recorded By, (t) = Taken By, (c) = Cosigned By    Initials Name Provider Type    CD Sury Latham PT Physical Therapist        Physical Therapy Education                 Title: PT OT SLP Therapies (In Progress)     Topic: Physical Therapy (In Progress)     Point: Precautions (In Progress)     Learning Progress Summary           Patient Nonacceptance, E,D, NL by CD at 9/26/2021 1542    Comment: BENEFITS OF OOB ACTIVITY.                               User Key     Initials Effective Dates Name Provider Type Counts include [facility] beds [facility] the [facility]    CD 06/16/21 -  Sury Latham PT Physical Therapist PT              PT Recommendation and Plan     Plan of Care Reviewed With: patient  Outcome Summary: GOALS NOT ESTABLISHED AS PT APPEARS AT BASELINE WITH FUNCTIONAL MOBILITY. PER CG PT IS DEPENDENT WITH ADL'S AND UTILIZES FELICIA LIFT BED TO W/C AT HOME. WILL DEFER TO NSG FOR CONTINUED LIFT TO RECLINER WITH MECHANICAL LIFT SYSTEM, ROM OF EXTREMITES DURING BATHING AND POSTIONING FOR COMFORT, PRESSURE RELIEF AND EDEMA REDUCTION. PT IS NOT APPROPRIATE FOR ONGOING SKILLED P.T.     Time Calculation:   PT Charges     Row Name 09/26/21 1543             Time  Calculation    Start Time  1442  -CD      PT Received On  21  -CD         Untimed Charges    PT Eval/Re-eval Minutes  64  -CD         Total Minutes    Untimed Charges Total Minutes  64  -CD       Total Minutes  64  -CD        User Key  (r) = Recorded By, (t) = Taken By, (c) = Cosigned By    Initials Name Provider Type    CD Sury Latham, PT Physical Therapist        Therapy Charges for Today     Code Description Service Date Service Provider Modifiers Qty    28303896110 HC PT EVAL LOW COMPLEXITY 4 2021 Sury Latham, PT GP 1          PT G-Codes  AM-PAC 6 Clicks Score (PT): 6    PT Discharge Summary  Anticipated Discharge Disposition (PT): home with  care    Sury Latham, PT  2021         Electronically signed by Sury Latham, PT at 21 1546       Occupational Therapy Notes (most recent note)    No notes exist for this encounter.            Speech Language Pathology Notes (most recent note)      Ariana Smalls MS CCC-SLP at 10/06/21 1525          Acute Care - Speech Language Pathology   Swallow Treatment Note [facility]     Patient Name: Rao Alcazar  : 1945  MRN: [id number]  Today's Date: 10/6/2021               Admit Date: 2021    Visit Dx:     ICD-10-CM ICD-9-CM   1. Pneumonia of both lower lobes due to infectious organism  J18.9 486   2. Acute on chronic respiratory failure with hypoxia (HCC)  J96.21 518.84     799.02   3. Oropharyngeal dysphagia  R13.12 787.22     Patient Active Problem List   Diagnosis   • Atypical chest pain   • Cardiomegaly   • Congestive heart failure (HCC)   • Shortness of breath   • Cardiac mass   • Chest pain   • Mental disorder   • History of panic attacks   • History of seizure disorder   • Mental retardation   • Hyperlipidemia   • Pneumonia of both lower lobes due to infectious organism   • Chronic diastolic CHF (congestive heart failure) (HCC)     Past Medical History:   Diagnosis Date   • Atypical chest pain    • Cardiomegaly    •  CHF (congestive heart failure) (CMS/HCC)    • History of panic attacks    • History of seizure disorder    • Mental disorder    • Mental retardation    • Panic attacks    • Seizures (CMS/HCC)      Past Surgical History:   Procedure Laterality Date   • EXPLORATORY LAPAROTOMY         SLP Recommendation and Plan  SLP Swallowing Diagnosis: mod-severe, oral dysphagia, severe, pharyngeal dysphagia (10/06/21 1430)  SLP Diet Recommendation: puree, nectar thick liquids, other (see comments) (COMFORT DIET) (10/06/21 1430)  Recommended Precautions and Strategies: upright posture during/after eating, small bites of food and sips of liquid, no straw, general aspiration precautions, 1:1 supervision, assist with feeding (10/06/21 1430)  SLP Rec. for Method of Medication Administration: meds crushed, with pudding or applesauce, as tolerated, meds via alternate route (10/06/21 1430)     Monitor for Signs of Aspiration: yes, notify SLP if any concerns (10/06/21 1430)     Swallow Criteria for Skilled Therapeutic Interventions Met: no significant expected improvement in functional status (10/06/21 1430)  Anticipated Discharge Disposition (SLP): unknown (10/06/21 1430)  Rehab Potential/Prognosis, Swallowing: re-evaluate goals as necessary (10/06/21 1430)  Therapy Frequency (Swallow): evaluation only (10/06/21 1430)        Daily Summary of Progress (SLP): progress toward functional goals as expected (10/06/21 1430)    Plan for Continued Treatment (SLP): Saw for dysphagia treatment for f/u on comfort diet. Pt now comfort measures & on comfort diet. Pt previously was on nectar-thick & pureed prior to admission. Given severity of s/s of discomfort & aspiration w/ thins, may be best to continue previous diet. No immediate s/s of discomfort w/ nectar-thick via tsp/cup or pureed/pudding trials. Pt unable to pull liquid via straw. Continue comfort diet w/ assist & no straws. No further SLP dysphagia needs at this time. Re-consult if any  changes. (10/06/21 1430)              Plan of Care Reviewed With: patient  Progress: no change         SWALLOW EVALUATION (last 72 hours)      SLP Adult Swallow Evaluation     Row Name 10/06/21 2680             Rehab Evaluation    Document Type  therapy note (daily note)  -RD      Subjective Information  no complaints  -RD      Patient Observations  alert;cooperative  -RD      Patient/Family/Caregiver Comments/Observations  no family present  -RD      Care Plan Review  evaluation/treatment results reviewed;care plan/treatment goals reviewed;risks/benefits reviewed;current/potential barriers reviewed  -RD      Patient Effort  adequate  -RD      Symptoms Noted During/After Treatment  --         General Information    Patient Profile Reviewed  --         Pain    Additional Documentation  Pain Scale: FACES Pre/Post-Treatment (Group)  -RD         Pain Scale: FACES Pre/Post-Treatment    Pain: FACES Scale, Pretreatment  0-->no hurt  -RD      Posttreatment Pain Rating  0-->no hurt  -RD         Clinical Impression    Daily Summary of Progress (SLP)  progress toward functional goals as expected  -RD      SLP Swallowing Diagnosis  mod-severe;oral dysphagia;severe;pharyngeal dysphagia  -RD      Functional Impact  risk of aspiration/pneumonia;risk of malnutrition;risk of dehydration  -RD      Rehab Potential/Prognosis, Swallowing  re-evaluate goals as necessary  -RD      Swallow Criteria for Skilled Therapeutic Interventions Met  no significant expected improvement in functional status  -RD      Plan for Continued Treatment (SLP)  Saw for dysphagia treatment for f/u on comfort diet. Pt now comfort measures & on comfort diet. Pt previously was on nectar-thick & pureed prior to admission. Given severity of s/s of discomfort & aspiration w/ thins, may be best to continue previous diet. No immediate s/s of discomfort w/ nectar-thick via tsp/cup or pureed/pudding trials. Pt unable to pull liquid via straw. Continue comfort diet w/  assist & no straws. No further SLP dysphagia needs at this time. Re-consult if any changes.  -RD         Recommendations    Therapy Frequency (Swallow)  evaluation only  -RD      Predicted Duration Therapy Intervention (Days)  --      SLP Diet Recommendation  puree;nectar thick liquids;other (see comments) COMFORT DIET  -RD      Recommended Precautions and Strategies  upright posture during/after eating;small bites of food and sips of liquid;no straw;general aspiration precautions;1:1 supervision;assist with feeding  -RD      Oral Care Recommendations  Oral Care BID/PRN;Suction toothbrush  -RD      SLP Rec. for Method of Medication Administration  meds crushed;with pudding or applesauce;as tolerated;meds via alternate route  -RD      Monitor for Signs of Aspiration  yes;notify SLP if any concerns  -RD      Anticipated Discharge Disposition (SLP)  unknown  -RD        User Key  (r) = Recorded By, (t) = Taken By, (c) = Cosigned By    Initials Name Effective Dates    RD Ariana Smalls MS CCC-SLP 06/16/21 -     Radha Savage MS CCC-SLP 06/16/21 -           EDUCATION  The patient has been educated in the following areas:   Dysphagia (Swallowing Impairment) Oral Care/Hydration Modified Diet Instruction comfort diet.       SLP GOALS     Row Name 10/06/21 1430 10/05/21 1300 10/04/21 1300       Oral Nutrition/Hydration Goal 1 (SLP)    Oral Nutrition/Hydration Goal 1, SLP  LTG: Pt will return to PO diet w/o s/s of aspiration w/ 100% accuracy w/o cues  -RD  LTG: Pt will return to PO diet w/o s/s of aspiration w/ 100% accuracy w/o cues  -CH  LTG: Pt will return to PO diet w/o s/s of aspiration w/ 100% accuracy w/o cues  -CH    Time Frame (Oral Nutrition/Hydration Goal 1, SLP)  by discharge  -RD  by discharge  -CH  by discharge  -CH    Progress/Outcomes (Oral Nutrition/Hydration Goal 1, SLP)  goal no longer appropriate  -RD  progress slower than expected  -CH  progress slower than expected  -CH       Oral  Nutrition/Hydration Goal 2 (SLP)    Oral Nutrition/Hydration Goal 2, SLP  Pt will tolerate therapeutic H2O trials w/o s/s of aspiration w/ 60% accuracy to indicate readiness for repeat instrumental eval.  -RD  Pt will tolerate therapeutic H2O trials w/o s/s of aspiration w/ 60% accuracy to indicate readiness for repeat instrumental eval.  -CH  Pt will tolerate therapeutic H2O trials w/o s/s of aspiration w/ 60% accuracy to indicate readiness for repeat instrumental eval.  -CH    Time Frame (Oral Nutrition/Hydration Goal 2, SLP)  short term goal (STG)  -RD  short term goal (STG)  -CH  short term goal (STG)  -CH    Barriers (Oral Nutrition/Hydration Goal 2, SLP)  pt now on comfort diet & comfort measures. Pt appears most comfortable w/ nectar-thick via tsp/cup & pureed. this was pt's diet prior to admission. NG now removed. No further needs  -RD  delayed cough following trials of ice chips and immediate cough following thin H2O via teaspoon. Multiple swallows with pureed trials.  -CH  Continued overt s/s of aspiration w/ therapeutic ice, thin and nectar thick liquid trials c/b wet vocal quality & coughing w/ suctioning. Not ready for repeat instrumental eval at this time  -CH    Progress/Outcomes (Oral Nutrition/Hydration Goal 2, SLP)  goal no longer appropriate  -RD  progress slower than expected  -CH  progress slower than expected  -CH      User Key  (r) = Recorded By, (t) = Taken By, (c) = Cosigned By    Initials Name Provider Type    RD Ariana Smalls MS CCC-SLP Speech and Language Pathologist    Radha Savage MS CCC-SLP Speech and Language Pathologist             Time Calculation:   Time Calculation- SLP     Row Name 10/06/21 1531             Time Calculation- SLP    SLP Start Time  1430  -RD      SLP Received On  10/06/21  -RD         Untimed Charges    56696-DP Treatment Swallow Minutes  38  -RD         Total Minutes    Untimed Charges Total Minutes  38  -RD       Total Minutes  38  -RD        User  Key  (r) = Recorded By, (t) = Taken By, (c) = Cosigned By    Initials Name Provider Type    RD Ariana Smalls MS CCC-SLP Speech and Language Pathologist          Therapy Charges for Today     Code Description Service Date Service Provider Modifiers Qty    [id number]  ST TREATMENT SWALLOW 3 10/6/2021 Ariana Smalls MS CCC-SLP GN 1      Patient was not wearing a face mask and did not exhibit coughing during this therapy encounter.  Procedure performed was aerosolizing, involved close contact (within 6 feet for at least 15 minutes or longer), and did not involve contact with infectious secretions or specimens.  Therapist used appropriate personal protective equipment including gloves, standard procedure mask and eye protection.  Appropriate PPE was worn during the entire therapy session.  Hand hygiene was completed before and after therapy session.          MS MINAL Dutton  10/6/2021    Electronically signed by Ariana Smalls MS CCC-SLP at 10/06/21 8230

## 2021-10-15 NOTE — PROGRESS NOTES
Continued Stay Note  [facility]     Patient Name: Rao Alcazar  MRN: [id number]  Today's Date: 10/15/2021    Admit Date: 9/24/2021     Discharge Plan     Row Name 10/15/21 2241       Plan    Plan Long term care placement    Plan Comments Hospice liaison continues to follow pt on the periphery while placement is sought. Once pt is placed, hospice will follow pt at the facility. Please call [phone] if can be of further assistance.    Row Name 10/15/21 5166       Plan    Plan Comments                Discharge Codes    No documentation.               Expected Discharge Date and Time     Expected Discharge Date Expected Discharge Time    Oct 15, 2021             Susan George RN

## 2021-10-16 PROCEDURE — 99232 SBSQ HOSP IP/OBS MODERATE 35: CPT | Performed by: NURSE PRACTITIONER

## 2021-10-16 PROCEDURE — 25010000002 HEPARIN (PORCINE) PER 1000 UNITS: Performed by: NURSE PRACTITIONER

## 2021-10-16 RX ADMIN — DOCUSATE SODIUM 50 MG AND SENNOSIDES 8.6 MG 2 TABLET: 8.6; 5 TABLET, FILM COATED ORAL at 08:08

## 2021-10-16 RX ADMIN — LEVOTHYROXINE SODIUM 25 MCG: 25 TABLET ORAL at 06:30

## 2021-10-16 RX ADMIN — PHENYTOIN 125 MG: 125 SUSPENSION ORAL at 15:47

## 2021-10-16 RX ADMIN — DOCUSATE SODIUM 50 MG AND SENNOSIDES 8.6 MG 2 TABLET: 8.6; 5 TABLET, FILM COATED ORAL at 20:37

## 2021-10-16 RX ADMIN — LEVETIRACETAM 2000 MG: 100 SOLUTION ORAL at 08:07

## 2021-10-16 RX ADMIN — ATORVASTATIN CALCIUM 10 MG: 10 TABLET, FILM COATED ORAL at 20:37

## 2021-10-16 RX ADMIN — MINERAL OIL: 1000 LIQUID ORAL at 20:39

## 2021-10-16 RX ADMIN — ASPIRIN 81 MG CHEWABLE TABLET 81 MG: 81 TABLET CHEWABLE at 08:07

## 2021-10-16 RX ADMIN — MINERAL OIL: 1000 LIQUID ORAL at 15:47

## 2021-10-16 RX ADMIN — CASTOR OIL AND BALSAM, PERU 1 APPLICATION: 788; 87 OINTMENT TOPICAL at 20:38

## 2021-10-16 RX ADMIN — LEVETIRACETAM 2000 MG: 100 SOLUTION ORAL at 20:37

## 2021-10-16 RX ADMIN — PHENYTOIN 125 MG: 125 SUSPENSION ORAL at 08:07

## 2021-10-16 RX ADMIN — CASTOR OIL AND BALSAM, PERU 1 APPLICATION: 788; 87 OINTMENT TOPICAL at 08:07

## 2021-10-16 RX ADMIN — HEPARIN SODIUM 5000 UNITS: 5000 INJECTION, SOLUTION INTRAVENOUS; SUBCUTANEOUS at 15:46

## 2021-10-16 RX ADMIN — PHENYTOIN 125 MG: 125 SUSPENSION ORAL at 20:37

## 2021-10-16 RX ADMIN — HEPARIN SODIUM 5000 UNITS: 5000 INJECTION, SOLUTION INTRAVENOUS; SUBCUTANEOUS at 20:37

## 2021-10-16 RX ADMIN — HEPARIN SODIUM 5000 UNITS: 5000 INJECTION, SOLUTION INTRAVENOUS; SUBCUTANEOUS at 06:30

## 2021-10-16 NOTE — PROGRESS NOTES
[facility] Medicine Services  PROGRESS NOTE    Patient Name: Rao Alcazar  : 1945  MRN: [id number]    Date of Admission: 2021  Primary Care Physician: Provider, No Known    Subjective   Subjective     CC:  F/U dysphagia     HPI:  No new concerns from staff. Patient appears comfortable in bed. Asking for pudding.     ROS:  UTO a reliable ROS     Objective   Objective     Vital Signs:   Temp:  [98.3 °F (36.8 °C)] 98.3 °F (36.8 °C)  Heart Rate:  [87] 87  Resp:  [16] 16  BP: (124)/(67) 124/67     Physical Exam:  Constitutional: No acute distress, awake, alert sitting upright in bed   HENT: NCAT, mucous membranes moist, edentulous   Respiratory: Clear to auscultation bilaterally, respiratory effort normal   Cardiovascular: RRR, no murmurs, rubs, or gallops  Gastrointestinal: Positive bowel sounds, soft, nontender, nondistended  Musculoskeletal: No bilateral ankle edema  Psychiatric: Appropriate affect, cooperative, calm   Neurologic: CRABTREE, speech difficult to understand  Skin: No rashes  No change from 10/15    Results Reviewed:  LAB RESULTS:          Lab 10/11/21  0521   SODIUM 136   POTASSIUM 4.2   CHLORIDE 100   CO2 27.0   ANION GAP 9.0   BUN 7*   CREATININE 0.44*   GLUCOSE 100*   CALCIUM 9.1   MAGNESIUM 1.9   PHOSPHORUS 2.7         Lab 10/11/21  0521   TOTAL PROTEIN 7.9   ALBUMIN 3.70   ALT (SGPT) 26   AST (SGOT) 26   BILIRUBIN 0.2   ALK PHOS 143*             Lab 10/11/21  0521   CHOLESTEROL 148   TRIGLYCERIDES 177*             Brief Urine Lab Results  (Last result in the past 365 days)      Color   Clarity   Blood   Leuk Est   Nitrite   Protein   CREAT   Urine HCG        21 1203 Yellow   Clear   Negative   Negative   Negative   Negative                 Microbiology Results Abnormal     Procedure Component Value - Date/Time    Blood Culture - Blood, Arm, Right [[id number]] Collected: 21 1150    Lab Status: Final result Specimen: Blood from Arm, Right Updated:  09/29/21 1231     Blood Culture No growth at 5 days    Blood Culture - Blood, Arm, Left [[id number]] Collected: 09/24/21 1150    Lab Status: Final result Specimen: Blood from Arm, Left Updated: 09/29/21 1231     Blood Culture No growth at 5 days    COVID PRE-OP / PRE-PROCEDURE SCREENING ORDER (NO ISOLATION) - Swab, Nasopharynx [[id number]]  (Normal) Collected: 09/24/21 1208    Lab Status: Final result Specimen: Swab from Nasopharynx Updated: 09/24/21 1236    Narrative:      The following orders were created for panel order COVID PRE-OP / PRE-PROCEDURE SCREENING ORDER (NO ISOLATION) - Swab, Nasopharynx.  Procedure                               Abnormality         Status                     ---------                               -----------         ------                     COVID-19 and FLU A/B PCR...[[id number]]  Normal              Final result                 Please view results for these tests on the individual orders.    COVID-19 and FLU A/B PCR - Swab, Nasopharynx [[id number]]  (Normal) Collected: 09/24/21 1208    Lab Status: Final result Specimen: Swab from Nasopharynx Updated: 09/24/21 1236     COVID19 Not Detected     Influenza A PCR Not Detected     Influenza B PCR Not Detected    Narrative:      Fact sheet for providers: https://www.fda.gov/media/341203/download    Fact sheet for patients: https://www.fda.gov/media/970649/download    Test performed by PCR.          No radiology results from the last 24 hrs    Results for orders placed during the hospital encounter of 09/24/21    Adult Transthoracic Echo Complete W/ Cont if Necessary Per Protocol    Interpretation Summary  · Normal left ventricular systolic function, estimated EF 60%.  · Aortic sclerosis without aortic stenosis.  · Trace aortic insufficiency.  · Trace mitral regurgitation, trace tricuspid regurgitation, normal RVSP.      I have reviewed the medications:  Scheduled Meds:aspirin, 81 mg, Oral, Daily   Or  aspirin, 300 mg, Rectal,  Daily  atorvastatin, 10 mg, Oral, Nightly  castor oil-balsam peru, 1 application, Topical, Q12H  heparin (porcine), 5,000 Units, Subcutaneous, Q8H  levETIRAcetam, 2,000 mg, Oral, Q12H  levothyroxine, 25 mcg, Oral, Q AM  palliative care oral rinse, , Mouth/Throat, TID - RT  phenytoin, 125 mg, Oral, TID  senna-docusate sodium, 2 tablet, Oral, BID      Continuous Infusions:   PRN Meds:.•  acetaminophen  •  senna-docusate sodium **AND** polyethylene glycol **AND** bisacodyl **AND** bisacodyl    Assessment/Plan   Assessment & Plan     Active Hospital Problems    Diagnosis  POA   • Pneumonia of both lower lobes due to infectious organism [J18.9]  Yes   • Chronic diastolic CHF (congestive heart failure) (HCC) [I50.32]  Unknown   • Hyperlipidemia [E78.5]  Yes   • Mental disorder [F99]  Yes   • History of seizure disorder [Z86.69]  Not Applicable   • Cardiomegaly [I51.7]  Yes      Resolved Hospital Problems   No resolved problems to display.        Brief Hospital Course to date:  Rao Alcazar is a 76 y.o. male patient with severe intellectual disability, nonverbal at baseline (guardian of state), with history of dyslipidemia, seizure disorder and chronic hypoxic respiratory failure on home oxygen at 2 L/min presented to the hospital with fever and cough and was found to have bilateral basal pneumonia.     This patient's problems and plans were partially entered by my partner and updated as appropriate by me 10/16/21.     GOC  -Pt DNR/DNI. He is on a comfort diet. Plan is LTC with hospice.     Aspiration pneumonia  Dysphagia   -CT chest showed bilateral pulmonary infiltrates and pleural effusion, concerning for community acquired pneumonia  -Has completed course of IV abx.  -Continue comfort diet.     Seizure disorder  -Continue Keppra and Dilantin  -Seizure precautions     Diastolic heart failure, not in decompensation  -echo EF 60%, aortic stenosis, normal RVSP   -Compensated. Continue  aspirin     Hyperlipidemia  -Continue statin     Severe intellectual disability   --Stable to baseline    DVT prophylaxis:  Medical DVT prophylaxis orders are present.       AM-PAC 6 Clicks Score (PT): 8 (10/16/21 0800)    Disposition: I expect the patient to be discharged to LTC with hospice when arranged.     CODE STATUS:   Code Status and Medical Interventions:   Ordered at: 10/05/21 1420     Code Status:    No CPR     Medical Interventions (Level of Support Prior to Arrest):    Comfort Measures       Jing Tran, APRN  10/16/21

## 2021-10-16 NOTE — PLAN OF CARE
Goal Outcome Evaluation:         Resting well. 100% intake at meals. Turn Q 2. Pending placement.

## 2021-10-16 NOTE — NURSING NOTE
Pt is alert when awake with VSS and no s/s of pain or discomfort. He did have a large BM this shift. A bath provided and skin care to coccyx. Currently resting well. CM seeking long term placement. Will CTM and provide care.

## 2021-10-17 PROCEDURE — 99232 SBSQ HOSP IP/OBS MODERATE 35: CPT | Performed by: NURSE PRACTITIONER

## 2021-10-17 PROCEDURE — 25010000002 HEPARIN (PORCINE) PER 1000 UNITS: Performed by: NURSE PRACTITIONER

## 2021-10-17 RX ADMIN — MINERAL OIL: 1000 LIQUID ORAL at 14:03

## 2021-10-17 RX ADMIN — MINERAL OIL: 1000 LIQUID ORAL at 21:18

## 2021-10-17 RX ADMIN — HEPARIN SODIUM 5000 UNITS: 5000 INJECTION, SOLUTION INTRAVENOUS; SUBCUTANEOUS at 14:03

## 2021-10-17 RX ADMIN — ATORVASTATIN CALCIUM 10 MG: 10 TABLET, FILM COATED ORAL at 21:18

## 2021-10-17 RX ADMIN — CASTOR OIL AND BALSAM, PERU 1 APPLICATION: 788; 87 OINTMENT TOPICAL at 21:18

## 2021-10-17 RX ADMIN — HEPARIN SODIUM 5000 UNITS: 5000 INJECTION, SOLUTION INTRAVENOUS; SUBCUTANEOUS at 05:22

## 2021-10-17 RX ADMIN — PHENYTOIN 125 MG: 125 SUSPENSION ORAL at 21:14

## 2021-10-17 RX ADMIN — LEVETIRACETAM 2000 MG: 100 SOLUTION ORAL at 08:07

## 2021-10-17 RX ADMIN — LEVETIRACETAM 2000 MG: 100 SOLUTION ORAL at 21:13

## 2021-10-17 RX ADMIN — ASPIRIN 81 MG CHEWABLE TABLET 81 MG: 81 TABLET CHEWABLE at 08:07

## 2021-10-17 RX ADMIN — PHENYTOIN 125 MG: 125 SUSPENSION ORAL at 15:55

## 2021-10-17 RX ADMIN — MINERAL OIL: 1000 LIQUID ORAL at 08:07

## 2021-10-17 RX ADMIN — DOCUSATE SODIUM 50 MG AND SENNOSIDES 8.6 MG 2 TABLET: 8.6; 5 TABLET, FILM COATED ORAL at 21:18

## 2021-10-17 RX ADMIN — HEPARIN SODIUM 5000 UNITS: 5000 INJECTION, SOLUTION INTRAVENOUS; SUBCUTANEOUS at 21:18

## 2021-10-17 RX ADMIN — LEVOTHYROXINE SODIUM 25 MCG: 25 TABLET ORAL at 05:18

## 2021-10-17 RX ADMIN — CASTOR OIL AND BALSAM, PERU 1 APPLICATION: 788; 87 OINTMENT TOPICAL at 08:07

## 2021-10-17 RX ADMIN — PHENYTOIN 125 MG: 125 SUSPENSION ORAL at 08:07

## 2021-10-17 NOTE — NURSING NOTE
Pt is alert when awake with VSS and no s/s of pain or discomfort. He did have a large BM this shift. Skin care to coccyx. Currently resting well. CM seeking long term placement. Will CTM and provide care.

## 2021-10-17 NOTE — PROGRESS NOTES
[facility] Medicine Services  PROGRESS NOTE    Patient Name: Rao Alcazar  : 1945  MRN: [id number]    Date of Admission: 2021  Primary Care Physician: Provider, No Known    Subjective   Subjective     CC:  F/U dysphagia     HPI:  Chart reviewed.  Large BM overnight.  Eating 100% of his meals with good appetite.  No overnight issues.     ROS:  Unable to obtain a reliable ROS due to mental status, which appears to be his baseline.     Objective   Objective     Vital Signs:   Temp:  [99.4 °F (37.4 °C)] 99.4 °F (37.4 °C)  Heart Rate:  [85] 85  Resp:  [16] 16  BP: (142)/(63) 142/63     Physical Exam:  Constitutional: No acute distress, awake, alert, resting comfortably in bed, nurse at bedside  HENT: NCAT, mucous membranes moist, edentulous  Respiratory: Clear to auscultation bilaterally, respiratory effort normal on room air  Cardiovascular: RRR, no murmurs, rubs, or gallops  Gastrointestinal: Positive bowel sounds, soft, nontender, nondistended  Musculoskeletal: No bilateral ankle edema  Psychiatric: Appropriate affect, cooperative  Neurologic: Mental status appears baseline, Moves all extremties, speech difficult to understand  Skin: No rashes noted to exposed skin       Results Reviewed:  LAB RESULTS:          Lab 10/11/21  0521   SODIUM 136   POTASSIUM 4.2   CHLORIDE 100   CO2 27.0   ANION GAP 9.0   BUN 7*   CREATININE 0.44*   GLUCOSE 100*   CALCIUM 9.1   MAGNESIUM 1.9   PHOSPHORUS 2.7         Lab 10/11/21  0521   TOTAL PROTEIN 7.9   ALBUMIN 3.70   ALT (SGPT) 26   AST (SGOT) 26   BILIRUBIN 0.2   ALK PHOS 143*             Lab 10/11/21  0521   CHOLESTEROL 148   TRIGLYCERIDES 177*             Brief Urine Lab Results  (Last result in the past 365 days)      Color   Clarity   Blood   Leuk Est   Nitrite   Protein   CREAT   Urine HCG        21 1203 Yellow   Clear   Negative   Negative   Negative   Negative                 Microbiology Results Abnormal     Procedure Component  Value - Date/Time    Blood Culture - Blood, Arm, Right [[id number]] Collected: 09/24/21 1150    Lab Status: Final result Specimen: Blood from Arm, Right Updated: 09/29/21 1231     Blood Culture No growth at 5 days    Blood Culture - Blood, Arm, Left [[id number]] Collected: 09/24/21 1150    Lab Status: Final result Specimen: Blood from Arm, Left Updated: 09/29/21 1231     Blood Culture No growth at 5 days    COVID PRE-OP / PRE-PROCEDURE SCREENING ORDER (NO ISOLATION) - Swab, Nasopharynx [[id number]]  (Normal) Collected: 09/24/21 1208    Lab Status: Final result Specimen: Swab from Nasopharynx Updated: 09/24/21 1236    Narrative:      The following orders were created for panel order COVID PRE-OP / PRE-PROCEDURE SCREENING ORDER (NO ISOLATION) - Swab, Nasopharynx.  Procedure                               Abnormality         Status                     ---------                               -----------         ------                     COVID-19 and FLU A/B PCR...[[id number]]  Normal              Final result                 Please view results for these tests on the individual orders.    COVID-19 and FLU A/B PCR - Swab, Nasopharynx [[id number]]  (Normal) Collected: 09/24/21 1208    Lab Status: Final result Specimen: Swab from Nasopharynx Updated: 09/24/21 1236     COVID19 Not Detected     Influenza A PCR Not Detected     Influenza B PCR Not Detected    Narrative:      Fact sheet for providers: https://www.fda.gov/media/265671/download    Fact sheet for patients: https://www.fda.gov/media/845013/download    Test performed by PCR.          No radiology results from the last 24 hrs    Results for orders placed during the hospital encounter of 09/24/21    Adult Transthoracic Echo Complete W/ Cont if Necessary Per Protocol    Interpretation Summary  · Normal left ventricular systolic function, estimated EF 60%.  · Aortic sclerosis without aortic stenosis.  · Trace aortic insufficiency.  · Trace mitral regurgitation, trace  tricuspid regurgitation, normal RVSP.      I have reviewed the medications:  Scheduled Meds:aspirin, 81 mg, Oral, Daily   Or  aspirin, 300 mg, Rectal, Daily  atorvastatin, 10 mg, Oral, Nightly  castor oil-balsam peru, 1 application, Topical, Q12H  heparin (porcine), 5,000 Units, Subcutaneous, Q8H  levETIRAcetam, 2,000 mg, Oral, Q12H  levothyroxine, 25 mcg, Oral, Q AM  palliative care oral rinse, , Mouth/Throat, TID - RT  phenytoin, 125 mg, Oral, TID  senna-docusate sodium, 2 tablet, Oral, BID      Continuous Infusions:   PRN Meds:.•  acetaminophen  •  senna-docusate sodium **AND** polyethylene glycol **AND** bisacodyl **AND** bisacodyl    Assessment/Plan   Assessment & Plan     Active Hospital Problems    Diagnosis  POA   • Pneumonia of both lower lobes due to infectious organism [J18.9]  Yes   • Chronic diastolic CHF (congestive heart failure) (HCC) [I50.32]  Unknown   • Hyperlipidemia [E78.5]  Yes   • Mental disorder [F99]  Yes   • History of seizure disorder [Z86.69]  Not Applicable   • Cardiomegaly [I51.7]  Yes      Resolved Hospital Problems   No resolved problems to display.        Brief Hospital Course to date:  Rao Alcazar is a 76 y.o. male patient with severe intellectual disability, nonverbal at baseline (guardian of state), with history of dyslipidemia, seizure disorder and chronic hypoxic respiratory failure on home oxygen at 2 L/min presented to the hospital with fever and cough and was found to have bilateral basal pneumonia.     This patient's problems and plans were partially entered by my partner and updated as appropriate by me 10/17/21.     GOC  -Pt DNR/DNI. He is on a comfort diet. Plan is LTC with hospice.     Aspiration pneumonia  Dysphagia   -CT chest showed bilateral pulmonary infiltrates and pleural effusion, concerning for community acquired pneumonia  -Has completed course of IV abx.  -Continue comfort diet. Patient with good appetite, eating 100% of his meals.      Seizure  disorder  -Continue Keppra and Dilantin  -Seizure precautions     Diastolic heart failure, not in decompensation  -echo EF 60%, aortic stenosis, normal RVSP   -Compensated. Continue aspirin     Hyperlipidemia  -Continue statin     Severe intellectual disability   --Stable at baseline    DVT prophylaxis:  Medical DVT prophylaxis orders are present.       AM-PAC 6 Clicks Score (PT): 8 (10/16/21 0800)    Disposition: I expect the patient to be discharged to LTC with hospice when arranged. Awaiting calls back from facilities.       CODE STATUS:   Code Status and Medical Interventions:   Ordered at: 10/05/21 1420     Code Status:    No CPR     Medical Interventions (Level of Support Prior to Arrest):    Comfort Measures       Trinidad Ingram, MONIKA  10/17/21

## 2021-10-17 NOTE — PLAN OF CARE
Goal Outcome Evaluation:           Progress: no change     Comfort measures maintained. Pt very vocal today, but still unable to understand him. 75%-100% eaten of all meals. Incontinent. Awaiting placement

## 2021-10-18 VITALS
HEART RATE: 81 BPM | WEIGHT: 185 LBS | OXYGEN SATURATION: 94 % | HEIGHT: 71 IN | RESPIRATION RATE: 20 BRPM | BODY MASS INDEX: 25.9 KG/M2 | SYSTOLIC BLOOD PRESSURE: 155 MMHG | DIASTOLIC BLOOD PRESSURE: 74 MMHG | TEMPERATURE: 98 F

## 2021-10-18 LAB — SARS-COV-2 RNA RESP QL NAA+PROBE: NOT DETECTED

## 2021-10-18 PROCEDURE — 99232 SBSQ HOSP IP/OBS MODERATE 35: CPT | Performed by: INTERNAL MEDICINE

## 2021-10-18 PROCEDURE — U0005 INFEC AGEN DETEC AMPLI PROBE: HCPCS | Performed by: INTERNAL MEDICINE

## 2021-10-18 PROCEDURE — 25010000002 HEPARIN (PORCINE) PER 1000 UNITS: Performed by: NURSE PRACTITIONER

## 2021-10-18 PROCEDURE — U0003 INFECTIOUS AGENT DETECTION BY NUCLEIC ACID (DNA OR RNA); SEVERE ACUTE RESPIRATORY SYNDROME CORONAVIRUS 2 (SARS-COV-2) (CORONAVIRUS DISEASE [COVID-19]), AMPLIFIED PROBE TECHNIQUE, MAKING USE OF HIGH THROUGHPUT TECHNOLOGIES AS DESCRIBED BY CMS-2020-01-R: HCPCS | Performed by: INTERNAL MEDICINE

## 2021-10-18 RX ADMIN — HEPARIN SODIUM 5000 UNITS: 5000 INJECTION, SOLUTION INTRAVENOUS; SUBCUTANEOUS at 14:49

## 2021-10-18 RX ADMIN — DOCUSATE SODIUM 50 MG AND SENNOSIDES 8.6 MG 2 TABLET: 8.6; 5 TABLET, FILM COATED ORAL at 09:30

## 2021-10-18 RX ADMIN — HEPARIN SODIUM 5000 UNITS: 5000 INJECTION, SOLUTION INTRAVENOUS; SUBCUTANEOUS at 22:30

## 2021-10-18 RX ADMIN — PHENYTOIN 125 MG: 125 SUSPENSION ORAL at 22:28

## 2021-10-18 RX ADMIN — DOCUSATE SODIUM 50 MG AND SENNOSIDES 8.6 MG 2 TABLET: 8.6; 5 TABLET, FILM COATED ORAL at 22:30

## 2021-10-18 RX ADMIN — MINERAL OIL: 1000 LIQUID ORAL at 09:34

## 2021-10-18 RX ADMIN — ASPIRIN 81 MG CHEWABLE TABLET 81 MG: 81 TABLET CHEWABLE at 09:30

## 2021-10-18 RX ADMIN — LEVETIRACETAM 2000 MG: 100 SOLUTION ORAL at 22:28

## 2021-10-18 RX ADMIN — PHENYTOIN 125 MG: 125 SUSPENSION ORAL at 09:30

## 2021-10-18 RX ADMIN — ATORVASTATIN CALCIUM 10 MG: 10 TABLET, FILM COATED ORAL at 22:30

## 2021-10-18 RX ADMIN — LEVOTHYROXINE SODIUM 25 MCG: 25 TABLET ORAL at 05:51

## 2021-10-18 RX ADMIN — HEPARIN SODIUM 5000 UNITS: 5000 INJECTION, SOLUTION INTRAVENOUS; SUBCUTANEOUS at 05:51

## 2021-10-18 RX ADMIN — CASTOR OIL AND BALSAM, PERU 1 APPLICATION: 788; 87 OINTMENT TOPICAL at 09:35

## 2021-10-18 RX ADMIN — MINERAL OIL: 1000 LIQUID ORAL at 15:42

## 2021-10-18 RX ADMIN — PHENYTOIN 125 MG: 125 SUSPENSION ORAL at 16:41

## 2021-10-18 RX ADMIN — CASTOR OIL AND BALSAM, PERU 1 APPLICATION: 788; 87 OINTMENT TOPICAL at 20:30

## 2021-10-18 RX ADMIN — LEVETIRACETAM 2000 MG: 100 SOLUTION ORAL at 09:30

## 2021-10-18 RX ADMIN — MINERAL OIL: 1000 LIQUID ORAL at 22:31

## 2021-10-18 NOTE — PROGRESS NOTES
[facility] Medicine Services  PROGRESS NOTE    Patient Name: Rao Alcazar  : 1945  MRN: [id number]    Date of Admission: 2021  Primary Care Physician: Provider, No Known    Subjective   Subjective     CC:  F/U dysphagia     HPI:  Pt getting cleaned up at bedside. Asking me to get him his pudding multiple times.     ROS:  Unable to obtain a reliable ROS due to mental status, which appears to be his baseline.     Objective   Objective     Vital Signs:         Physical Exam:  Constitutional: No acute distress, awake, alert, resting comfortably in bed  HENT: NCAT, mucous membranes moist, edentulous  Respiratory: Clear to auscultation bilaterally, respiratory effort normal on room air  Cardiovascular: RRR, no murmurs, rubs, or gallops  Gastrointestinal: Positive bowel sounds, soft, nontender, nondistended  Musculoskeletal: No bilateral ankle edema  Psychiatric: Appropriate affect, cooperative  Neurologic: Mental status appears baseline, Moves all extremties, speech difficult to understand  Skin: No rashes noted to exposed skin       Results Reviewed:  LAB RESULTS:                              Brief Urine Lab Results  (Last result in the past 365 days)      Color   Clarity   Blood   Leuk Est   Nitrite   Protein   CREAT   Urine HCG        21 1203 Yellow   Clear   Negative   Negative   Negative   Negative                 Microbiology Results Abnormal     Procedure Component Value - Date/Time    Blood Culture - Blood, Arm, Right [[id number]] Collected: 21 115    Lab Status: Final result Specimen: Blood from Arm, Right Updated: 21 1231     Blood Culture No growth at 5 days    Blood Culture - Blood, Arm, Left [[id number]] Collected: 21 1150    Lab Status: Final result Specimen: Blood from Arm, Left Updated: 21 1231     Blood Culture No growth at 5 days    COVID PRE-OP / PRE-PROCEDURE SCREENING ORDER (NO ISOLATION) - Swab, Nasopharynx [[id number]]  (Normal)  Collected: 09/24/21 1208    Lab Status: Final result Specimen: Swab from Nasopharynx Updated: 09/24/21 1236    Narrative:      The following orders were created for panel order COVID PRE-OP / PRE-PROCEDURE SCREENING ORDER (NO ISOLATION) - Swab, Nasopharynx.  Procedure                               Abnormality         Status                     ---------                               -----------         ------                     COVID-19 and FLU A/B PCR...[[id number]]  Normal              Final result                 Please view results for these tests on the individual orders.    COVID-19 and FLU A/B PCR - Swab, Nasopharynx [[id number]]  (Normal) Collected: 09/24/21 1208    Lab Status: Final result Specimen: Swab from Nasopharynx Updated: 09/24/21 1236     COVID19 Not Detected     Influenza A PCR Not Detected     Influenza B PCR Not Detected    Narrative:      Fact sheet for providers: https://www.fda.gov/media/011377/download    Fact sheet for patients: https://www.fda.gov/media/686685/download    Test performed by PCR.          No radiology results from the last 24 hrs    Results for orders placed during the hospital encounter of 09/24/21    Adult Transthoracic Echo Complete W/ Cont if Necessary Per Protocol    Interpretation Summary  · Normal left ventricular systolic function, estimated EF 60%.  · Aortic sclerosis without aortic stenosis.  · Trace aortic insufficiency.  · Trace mitral regurgitation, trace tricuspid regurgitation, normal RVSP.      I have reviewed the medications:  Scheduled Meds:aspirin, 81 mg, Oral, Daily   Or  aspirin, 300 mg, Rectal, Daily  atorvastatin, 10 mg, Oral, Nightly  castor oil-balsam peru, 1 application, Topical, Q12H  heparin (porcine), 5,000 Units, Subcutaneous, Q8H  levETIRAcetam, 2,000 mg, Oral, Q12H  levothyroxine, 25 mcg, Oral, Q AM  palliative care oral rinse, , Mouth/Throat, TID - RT  phenytoin, 125 mg, Oral, TID  senna-docusate sodium, 2 tablet, Oral, BID      Continuous  Infusions:   PRN Meds:.•  acetaminophen  •  senna-docusate sodium **AND** polyethylene glycol **AND** bisacodyl **AND** bisacodyl    Assessment/Plan   Assessment & Plan     Active Hospital Problems    Diagnosis  POA   • Pneumonia of both lower lobes due to infectious organism [J18.9]  Yes   • Chronic diastolic CHF (congestive heart failure) (HCC) [I50.32]  Unknown   • Hyperlipidemia [E78.5]  Yes   • Mental disorder [F99]  Yes   • History of seizure disorder [Z86.69]  Not Applicable   • Cardiomegaly [I51.7]  Yes      Resolved Hospital Problems   No resolved problems to display.        Brief Hospital Course to date:  Rao Alcazar is a 76 y.o. male patient with severe intellectual disability, nonverbal at baseline ([facility] of [facility]), with history of dyslipidemia, seizure disorder and chronic hypoxic respiratory failure on home oxygen at 2 L/min presented to the hospital with fever and cough and was found to have bilateral basal pneumonia.     This patient's problems and plans were partially entered by my partner and updated as appropriate by me 10/18/21.     GOC  -Pt DNR/DNI. He is on a comfort diet. Plan is LTC with hospice.     Aspiration pneumonia  Dysphagia   -CT chest showed bilateral pulmonary infiltrates and pleural effusion, concerning for community acquired pneumonia  -Has completed course of IV abx.  -Continue comfort diet. Patient with good appetite, eating 100% of his meals.      Seizure disorder  -Continue Keppra and Dilantin  -Seizure precautions     Diastolic heart failure  -echo EF 60%, aortic stenosis, normal RVSP   -Compensated. Continue aspirin     Hyperlipidemia  -Continue statin     Severe intellectual disability   --Stable at baseline    DVT prophylaxis:  Medical DVT prophylaxis orders are present.       AM-PAC 6 Clicks Score (PT): 6 (10/17/21 2000)    Disposition: I expect the patient to be discharged to LTC with hospice tomorrow, have ordered a screening COVID test for this.     CODE STATUS:    Code Status and Medical Interventions:   Ordered at: 10/05/21 1420     Code Status:    No CPR     Medical Interventions (Level of Support Prior to Arrest):    Comfort Measures       Dottie Hall MD  10/18/21               No

## 2021-10-18 NOTE — CASE MANAGEMENT/SOCIAL WORK
Continued Stay Note  [facility]     Patient Name: Rao Alcazar  MRN: [id number]  Today's Date: 10/18/2021    Admit Date: 9/24/2021     Discharge Plan     Row Name 10/18/21 1140       Plan    Plan Comments MSW was contacted by Nicole at [facility] and rehab. Pt has been offered a bed there tomorrow. Pt will need a new Covid test. Ambulance has been arranged for 4:00pm tomorrow (Tuesday 10/19). Pt will also need a PASRR and MSW will assist with this form to be signed by physician and faxed to [facility] and rehab.               Discharge Codes    No documentation.               Expected Discharge Date and Time     Expected Discharge Date Expected Discharge Time    Oct 15, 2021             CHELSEY Jurado

## 2021-10-18 NOTE — PLAN OF CARE
Goal Outcome Evaluation:         Comfort measures maintained. Pt very vocal  but still unable to understand him. Slept well throughout night. Incontinent. Awaiting placement

## 2021-10-18 NOTE — DISCHARGE PLACEMENT REQUEST
"Rao Reyes (76 y.o. Male)     Referred by: El Vázquez,               Date of Birth Social Security Number Address Home Phone MRN    1945  [address]  [facility] [address] [phone] [id number]    Sabianist Marital Status             Non-Baptism Single       Admission Date Admission Type Admitting Provider Attending Provider Department, Room/Bed    9/24/21 Emergency Dottie Hall MD Howard, Gabriela Kirk, MD [facility] 5B, N538/1    Discharge Date Discharge Disposition Discharge Destination                         Attending Provider: Dottie Hall MD    Allergies: No Known Allergies    Isolation: None   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)   Code Status: No CPR   Advance Care Planning Activity    Ht: 180 cm (70.87\")   Wt: 83.9 kg (185 lb)    Admission Cmt: None   Principal Problem: None                Active Insurance as of 9/24/2021     Primary Coverage     Payor Plan Insurance Group Employer/Plan Group    MEDICARE MEDICARE A & B      Payor Plan Address Payor Plan Phone Number Payor Plan Fax Number Effective Dates    [address] [phone]  6/1/2011 - None Entered    [facility] [id number]       Subscriber Name Subscriber Birth Date Member ID       ERICRAO HERNANDEZ 1945 [id number]           Secondary Coverage     Payor Plan Insurance Group Employer/Plan Group    [place]      Payor Plan Address Payor Plan Phone Number Payor Plan Fax Number Effective Dates    [address] [phone]  9/28/2017 - None Entered    [place] [address]       Subscriber Name Subscriber Birth Date Member ID       RAO REYES 1945 [id number]                 Emergency Contacts      (Rel.) Home Phone Work Phone Mobile Phone    FRANTZ JARQUIN (Guardian) -- [phone]            Insurance Information                MEDICARE/MEDICARE A & B Phone: [phone]    Subscriber: Rao Reyes Subscriber#: [id number]    " Group#: -- Precert#: --        [place] Phone: [phone]    Subscriber: Ottoniel Rao J Subscriber#: [id number]    Group#: -- Precert#: --          Problem List           Codes Noted - Resolved       Hospital    Pneumonia of both lower lobes due to infectious organism ICD-10-CM: J18.9  ICD-9-CM: 486 2021 - Present    Chronic diastolic CHF (congestive heart failure) (HCC) ICD-10-CM: I50.32  ICD-9-CM: 428.32, 428.0 2021 - Present    Hyperlipidemia ICD-10-CM: E78.5  ICD-9-CM: 272.4 2016 - Present    Mental disorder ICD-10-CM: F99  ICD-9-CM: 300.9 Unknown - Present    History of seizure disorder ICD-10-CM: Z86.69  ICD-9-CM: V12.49 Unknown - Present    Cardiomegaly ICD-10-CM: I51.7  ICD-9-CM: 429.3 2016 - Present       Non-Hospital    History of panic attacks ICD-10-CM: Z86.59  ICD-9-CM: V11.8 Unknown - Present    Mental retardation ICD-10-CM: F79  ICD-9-CM: 319 Unknown - Present    Atypical chest pain ICD-10-CM: R07.89  ICD-9-CM: 786.59 2016 - Present    Congestive heart failure (HCC) ICD-10-CM: I50.9  ICD-9-CM: 428.0 2016 - Present    Shortness of breath ICD-10-CM: R06.02  ICD-9-CM: 786.05 2016 - Present    Cardiac mass ICD-10-CM: I51.89  ICD-9-CM: 429.89 2016 - Present    Chest pain ICD-10-CM: R07.9  ICD-9-CM: 786.50 2016 - Present             History & Physical      Johnathan Sanchez MD at 21 1700              [facility] Medicine Services  HISTORY AND PHYSICAL    Patient Name: Rao HERNANDEZ Ottoniel  : 1945  MRN: [id number]  Primary Care Physician: Provider, No Known  Date of admission: 2021    Subjective   Subjective     Chief Complaint:  Cough and fever    HPI:  Rao Alcazar is a 76 y.o. 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  Patient is not able to provide any history, history is obtained from caregiver at bedside.  Caregiver indicates that the patient began having more difficulty breathing with productive cough and fever overnight.  T-max of 101.5.  Patient additionally noted to be more weak than normal.  Patient has had several aspiration pneumonias in the past and the caregiver was concerned that this is a possibility again so chose to bring him to the ER.  Labs with elevated WBC 18, pro-Octavio 0.41.  Chest x-ray showed enlarged heart with increased pulmonary vascularity bilaterally but no acute parenchymal disease.  CT chest shows patchy groundglass opacity in the perihilar regions bilaterally concerning for infiltrate.  Patient will be admitted to hospital medicine for further evaluation and treatment.    COVID Details:        Symptoms: [] NONE [x] Fever [x]  Cough [] Shortness of breath [] Change in taste or smell  The patient has a COVID HM Topic on their chart, and they are fully vaccinated.    Review of Systems   Unable to obtain due to nonverbal  All other systems reviewed and are negative.     Personal History     Past Medical History:   Diagnosis Date   • Atypical chest pain    • Cardiomegaly    • CHF (congestive heart failure) (CMS/HCC)    • History of panic attacks    • History of seizure disorder    • Mental disorder    • Mental retardation    • Panic attacks    • Seizures (CMS/HCC)        Past Surgical History:   Procedure Laterality Date   • EXPLORATORY LAPAROTOMY         Family History:  Family history is unknown by patient. Otherwise pertinent FHx was reviewed and unremarkable.     Social History:  reports that he has never smoked. He has never used smokeless tobacco. He reports that he does not drink alcohol and does not use drugs.  Social History     Social History Narrative   • Not on file       Medications:  Calcium Carb-Cholecalciferol, Menthol-Zinc Oxide, Polyethylene Glycol 3350,  acetaminophen, aspirin, clonazePAM, hydrophor, levETIRAcetam, levothyroxine, loperamide, mupirocin, phenytoin extended, raNITIdine, sennosides-docusate, simvastatin, and tamsulosin    No Known Allergies    Objective   Objective     Vital Signs:   Temp:  [99.3 °F (37.4 °C)] 99.3 °F (37.4 °C)  Heart Rate:  [68-89] 68  Resp:  [16-22] 18  BP: ()/(53-91) 102/53  Flow (L/min):  [2] 2    Physical Exam   Constitutional: lethargic, chronically ill appearing  Eyes: PERRLA, sclerae anicteric, no conjunctival injection  HENT: NCAT, mucous membranes DRY   Neck: Supple, no thyromegaly, no lymphadenopathy, trachea midline  Respiratory: Bilateral rhonchi with decreased bases, nonlabored respirations on 2lnc   Cardiovascular: RRR, no murmurs, rubs, or gallops, palpable pedal pulses bilaterally  Gastrointestinal: Positive bowel sounds, soft, nontender, nondistended  Musculoskeletal: No bilateral ankle edema, no clubbing or cyanosis to extremities  Psychiatric: ADDIE  Neurologic: CRABTREE spontaneously, doesn't follow commands, nonverbal   Skin: No rashes    Result Review:  I have personally reviewed the results from the time of this admission to 09/24/21 5:00 PM EDT and agree with these findings:  [x]  Laboratory  [x]  Microbiology  [x]  Radiology  [x]  EKG/Telemetry   []  Cardiology/Vascular   []  Pathology  [x]  Old records  []  Other:  Most notable findings include:   Non verbal, very dehydrated looking    LAB RESULTS:      Lab 09/24/21  1249 09/24/21  1150   WBC  --  18.24*   HEMOGLOBIN  --  15.6   HEMATOCRIT  --  47.2   PLATELETS  --  260   NEUTROS ABS  --  15.15*   IMMATURE GRANS (ABS)  --  0.08*   LYMPHS ABS  --  1.88   MONOS ABS  --  1.09*   EOS ABS  --  0.01   MCV  --  95.4   PROCALCITONIN 0.41*  --    LACTATE  --  1.9         Lab 09/24/21  1249   SODIUM 138   POTASSIUM 4.6   CHLORIDE 99   CO2 29.0   ANION GAP 10.0   BUN 16   CREATININE 0.93   GLUCOSE 162*   CALCIUM 9.0   MAGNESIUM 1.9         Lab 09/24/21  1249   TOTAL  PROTEIN 8.0   ALBUMIN 3.70   GLOBULIN 4.3   ALT (SGPT) 7   AST (SGOT) 10   BILIRUBIN 0.3   ALK PHOS 181*         Lab 09/24/21  1249   PROBNP 197.8   TROPONIN T <0.010                 UA    Urinalysis 9/24/21   Specific [place], UA 1.020   Ketones, UA Negative   Blood, UA Negative   Leukocytes, UA Negative   Nitrite, UA Negative           Microbiology Results (last 10 days)     Procedure Component Value - Date/Time    COVID PRE-OP / PRE-PROCEDURE SCREENING ORDER (NO ISOLATION) - Swab, Nasopharynx [[id number]]  (Normal) Collected: 09/24/21 1208    Lab Status: Final result Specimen: Swab from Nasopharynx Updated: 09/24/21 1236    Narrative:      The following orders were created for panel order COVID PRE-OP / PRE-PROCEDURE SCREENING ORDER (NO ISOLATION) - Swab, Nasopharynx.  Procedure                               Abnormality         Status                     ---------                               -----------         ------                     COVID-19 and FLU A/B PCR...[[id number]]  Normal              Final result                 Please view results for these tests on the individual orders.    COVID-19 and FLU A/B PCR - Swab, Nasopharynx [[id number]]  (Normal) Collected: 09/24/21 1208    Lab Status: Final result Specimen: Swab from Nasopharynx Updated: 09/24/21 1236     COVID19 Not Detected     Influenza A PCR Not Detected     Influenza B PCR Not Detected    Narrative:      Fact sheet for providers: https://www.fda.gov/media/737084/download    Fact sheet for patients: https://www.fda.gov/media/313825/download    Test performed by PCR.          CT Abdomen Pelvis Without Contrast    Result Date: 9/24/2021  EXAMINATION: CT CHEST WO CONTRAST, DIAGNOSTIC-, CT ABDOMEN AND PELVIS WO CONTRAST-09/24/2021:  INDICATION: Sepsis, fever, chest pain.  TECHNIQUE: Multiple axial CT imaging was obtained of the chest, abdomen and pelvis without the administration of intravenous contrast.  The radiation dose reduction device was turned  on for each scan per the ALARA (As Low as Reasonably Achievable) protocol.  COMPARISON: NONE.  FINDINGS:  CHEST:  The thyroid is homogeneous. No mediastinal mass or adenopathy. The cardiac chambers are within normal limits.  No pericardial effusion. There is patchy groundglass opacity seen in the perihilar regions bilaterally concerning for infiltrate. There is a tiny left pleural effusion. Degenerative changes seen within the spine.  ABDOMEN: The liver is heterogeneous in appearance. There is calcification seen within the spleen. Old healed granulomatous disease as well seen within the liver. The pancreas is homogeneous. The kidneys and adrenal glands are within normal limits. No abdominal or retroperitoneal lymphadenopathy. No free fluid or free air. There is stool seen scattered diffusely throughout the colon.  PELVIS: The pelvic organs are unremarkable. The pelvic portions of the gastrointestinal tract are within normal limits. No free fluid or free air. No abnormal mass or fluid collection is identified. Degenerative changes seen within the spine and pelvis.      Impression: Increased perihilar markings bilaterally suggesting bilateral infiltrate with small left pleural effusion. The cardiac chambers are enlarged with increased stool seen scattered diffusely throughout the colon. No evidence of obvious obstruction or gross free intraperitoneal air.  D:  09/24/2021 E:  09/24/2021        CT Chest Without Contrast Diagnostic    Result Date: 9/24/2021  EXAMINATION: CT CHEST WO CONTRAST, DIAGNOSTIC-, CT ABDOMEN AND PELVIS WO CONTRAST-09/24/2021:  INDICATION: Sepsis, fever, chest pain.  TECHNIQUE: Multiple axial CT imaging was obtained of the chest, abdomen and pelvis without the administration of intravenous contrast.  The radiation dose reduction device was turned on for each scan per the ALARA (As Low as Reasonably Achievable) protocol.  COMPARISON: NONE.  FINDINGS:  CHEST:  The thyroid is homogeneous. No  mediastinal mass or adenopathy. The cardiac chambers are within normal limits.  No pericardial effusion. There is patchy groundglass opacity seen in the perihilar regions bilaterally concerning for infiltrate. There is a tiny left pleural effusion. Degenerative changes seen within the spine.  ABDOMEN: The liver is heterogeneous in appearance. There is calcification seen within the spleen. Old healed granulomatous disease as well seen within the liver. The pancreas is homogeneous. The kidneys and adrenal glands are within normal limits. No abdominal or retroperitoneal lymphadenopathy. No free fluid or free air. There is stool seen scattered diffusely throughout the colon.  PELVIS: The pelvic organs are unremarkable. The pelvic portions of the gastrointestinal tract are within normal limits. No free fluid or free air. No abnormal mass or fluid collection is identified. Degenerative changes seen within the spine and pelvis.      Impression: Increased perihilar markings bilaterally suggesting bilateral infiltrate with small left pleural effusion. The cardiac chambers are enlarged with increased stool seen scattered diffusely throughout the colon. No evidence of obvious obstruction or gross free intraperitoneal air.  D:  09/24/2021 E:  09/24/2021        XR Chest 1 View    Result Date: 9/24/2021  EXAMINATION: XR CHEST 1 VW-09/24/2021:  INDICATION: Weak/Dizzy/AMS, triage protocol.  COMPARISON: NONE.  FINDINGS: Portable chest reveals the heart to be borderline enlarged. Increased pulmonary vascularity bilaterally. No definite pleural effusion or pneumothorax. Degenerative changes seen within the spine. Increased pulmonary vascularity bilaterally.      Impression: The heart is enlarged with increased pulmonary vascularity bilaterally. No evidence of acute parenchymal disease.  D:  09/24/2021 E:  09/24/2021          Results for orders placed in visit on 07/28/16    SCANNED - ECHOCARDIOGRAM      Assessment/Plan   Assessment &  Plan       Cardiomegaly    Mental disorder    History of seizure disorder    Hyperlipidemia    Pneumonia of both lower lobes due to infectious organism    Chronic diastolic CHF (congestive heart failure) (HCC)      Aspiration pneumonia  --previously treated for aspiration pneumonia several times per care giver  --currently on modified diet   --CT chest with bilateral infiltrates and tiny left pleural effusion  --Started on IV Zosyn and vancomycin in ED  --MRSA PCR pending, if negative will stop vancomycin  --SLP consultation   --NPO tonight     Seizure disorder  --Continue Keppra and Dilantin  --last seizure was one month ago  --follows with Dr Fulton     Diastolic heart failure  Cardiomegaly  --ASA    Hyperlipidemia  --Continue statin    DVT prophylaxis:  Heparin     CODE STATUS:    Level Of Support Discussed With: Health Care Surrogate  Code Status: CPR  Medical Interventions (Level of Support Prior to Arrest): Full      This note has been completed as part of a split-shared workflow.   Signature: Electronically signed by MONIKA Zaragoza, 09/24/21, 5:29 PM EDT.    Attending   Admission Attestation       I have seen and examined the patient, performing an independent face-to-face diagnostic evaluation with plan of care reviewed and developed with the advanced practice clinician (APC).      Brief Summary Statement:   Rao Alcazar is a 76 y.o. 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    He is non verbal and unable to provide history.  His caregiver is in the ER and can provide some but limited history.  He looks dehydrated and chronically ill and is tachycardic in the ER on my visit.   He breathes with his mouth wide open.    Remainder of detailed HPI is as noted by APC and has been reviewed and/or edited by me for completeness.    Attending Physical Exam:    Constitutional:  sleeping, chronically ill appearing  Eyes: PERRLA, sclerae anicteric, no conjunctival injection  HENT: NCAT, dry tongue  Neck: Supple, no JVD, trachea midline  Respiratory: Clear to auscultation bilaterally, nonlabored respirations   Cardiovascular: tachy, s1 and s2  Gastrointestinal: Positive bowel sounds, soft, nontender, nondistended  Musculoskeletal: No bilateral ankle edema, no clubbing or cyanosis to extremities  Psychiatric: flat affect, not communicating  Skin: dry, + skin tenting    Brief Assessment/Plan :  See detailed assessment and plan developed with APC which I have reviewed and/or edited for completeness.      Admission Status: I believe that this patient meets INPATIENT status due to fever of unclear etiology and probable aspiration.  I feel patient’s risk for adverse outcomes and need for care warrant INPATIENT evaluation and I predict the patient’s care encounter to likely last beyond 2 midnights.        Johnathan Sanchez MD  09/24/21                          Electronically signed by Johnathan Sanchez MD at 09/24/21 2211          Emergency Department Notes      Wilfredo Felton DO at 09/24/21 1147            [place]    EMERGENCY DEPARTMENT ENCOUNTER      Pt Name: Rao Alcazar  MRN: [id number]  YOB: 1945  Date of evaluation: 9/24/2021  Provider: Wilfredo Felton DO    CHIEF COMPLAINT       Chief Complaint   Patient presents with   • Fever         HISTORY OF PRESENT ILLNESS  (Location/Symptom, Timing/Onset, Context/Setting, Quality, Duration, Modifying Factors, Severity.)   Rao Alcazar is a 76 y.o. male who presents to the emergency department via EMS from his caregiver was concerned about increasing congestion shortness of breath, fever, concern for possible aspiration type pneumonia.  The patient is nonverbal thus does not provide additional history which makes my HPI significantly limited.  The caregiver states the patient is on supplemental oxygen at home, lately they have  been concerned about increased shivering and tremors, has an underlying history of seizure activity, have not witnessed any full-blown seizure episodes.  They do note low-grade fever, no obvious source of infection.  They do note increased cough and congestion with no significant sputum production.  Patient usually sleeps with his mouth open throughout the day and there is a concern about recurrent aspiration pneumonia.  Remainder history is limited secondary to patient is nonverbal state.      Nursing notes were reviewed.    REVIEW OF SYSTEMS    (2-9 systems for level 4, 10 or more for level 5)   ROS:  General: Positive fever, chills, generalized weakness  Cardiovascular:  No chest pain, no palpitations  Respiratory:  No shortness of breath, + cough, no wheezing  Gastrointestinal:  No pain, no nausea, no vomiting, no diarrhea    Except as noted above unable to fully perform review of systems secondary to patient's limited verbal ability.          PAST MEDICAL HISTORY     Past Medical History:   Diagnosis Date   • Atypical chest pain    • Cardiomegaly    • CHF (congestive heart failure) (CMS/HCC)    • History of panic attacks    • History of seizure disorder    • Mental disorder    • Mental retardation    • Panic attacks    • Seizures (CMS/HCC)          SURGICAL HISTORY       Past Surgical History:   Procedure Laterality Date   • EXPLORATORY LAPAROTOMY           CURRENT MEDICATIONS       Current Facility-Administered Medications:   •  sodium chloride 0.9 % flush 10 mL, 10 mL, Intravenous, PRN, Wilfredo Felton, DO    Current Outpatient Medications:   •  acetaminophen (PAIN RELIEVER) 325 MG tablet, Take 1-2 tablets by mouth every 4 (four) hours as needed., Disp: , Rfl:   •  aspirin 81 MG tablet, Take 1 tablet by mouth daily., Disp: , Rfl:   •  Calcium Carb-Cholecalciferol (CALCIUM 600 + D) 600-200 MG-UNIT tablet, Take 1 tablet by mouth 2 (two) times a day., Disp: , Rfl:   •  clonazePAM (KLONOPIN) 0.5 MG tablet,  Take 0.5 tablets by mouth 3 (three) times a day., Disp: , Rfl:   •  hydrophor (AQUAPHOR) ointment ointment, Apply  topically to the appropriate area as directed Every 12 (Twelve) Hours., Disp: , Rfl:   •  levETIRAcetam (KEPPRA) 500 MG tablet, Take 4 tablets by mouth 2 (two) times a day., Disp: , Rfl:   •  levothyroxine (SYNTHROID) 25 MCG tablet, Take 1 tablet by mouth daily., Disp: , Rfl:   •  loperamide (IMODIUM) 2 MG capsule, Take 2 mg by mouth 4 (Four) Times a Day As Needed for Diarrhea., Disp: , Rfl:   •  Menthol-Zinc Oxide (CALMOSEPTINE EX), Apply  topically., Disp: , Rfl:   •  mupirocin (BACTROBAN) 2 % ointment, Apply  topically to the appropriate area as directed 3 (Three) Times a Day., Disp: , Rfl:   •  phenytoin extended (DILANTIN) 200 MG ER capsule, Take 200 mg by mouth 2 (Two) Times a Day., Disp: , Rfl:   •  Polyethylene Glycol 3350 (MIRALAX PO), Take  by mouth As Needed., Disp: , Rfl:   •  ranitidine (ZANTAC) 300 MG tablet, Take 1 tablet by mouth 2 (two) times a day., Disp: , Rfl:   •  senna-docusate (SENOKOT S) 8.6-50 MG per tablet, Take 1 tablet by mouth Daily., Disp: , Rfl:   •  simvastatin (ZOCOR) 20 MG tablet, Take 1 tablet by mouth daily., Disp: , Rfl:   •  tamsulosin (FLOMAX) 0.4 MG capsule 24 hr capsule, Take 1 capsule by mouth daily., Disp: , Rfl:     ALLERGIES     Patient has no known allergies.    FAMILY HISTORY       Family History   Family history unknown: Yes          SOCIAL HISTORY       Social History     Socioeconomic History   • Marital status: Single     Spouse name: Not on file   • Number of children: Not on file   • Years of education: Not on file   • Highest education level: Not on file   Tobacco Use   • Smoking status: Never Smoker   • Smokeless tobacco: Never Used   Substance and Sexual Activity   • Alcohol use: No   • Drug use: No         PHYSICAL EXAM    (up to 7 for level 4, 8 or more for level 5)     Vitals:    09/24/21 1141 09/24/21 1300 09/24/21 1330 09/24/21 1400   BP:  "108/58 117/59 103/56 99/60   BP Location:  Right arm Right arm Right arm   Patient Position:  Lying Lying Lying   Pulse: 89 77 73 71   Resp: 22 18 18 18   Temp: 99.3 °F (37.4 °C)      TempSrc: Oral      SpO2: 94% 93% 96% 95%   Weight: 83.9 kg (185 lb)      Height: 180.3 cm (71\")          Physical Exam  General : Patient is sitting upright in the bed, appears to have multiple underlying issues with underlying neurological and developmental delay disorders.  Patient is sitting with his head back on the bed, mouth open, dry oral mucosa, drooling out of his mouth.  HEENT: Pupils are equally round and reactive to light, EOMI, conjunctivae clear, sclerae white  Neck: Neck is supple, full range of motion, trachea midline  Cardiac: Heart regular rate, rhythm, no murmurs, rubs, or gallops  Lungs: Lungs decreased breath sound bilaterally, scattered rhonchi diffusely throughout the lung fields  Chest wall: There is no tenderness to palpation over the chest wall or over ribs  Abdomen: Abdomen is soft, nontender, nondistended. There are no firm or pulsatile masses, no rebound rigidity or guarding.   Musculoskeletal: No significant edema, limited movement, patient not following commands.  Neuro: Patient not following commands, unable to fully perform neurological examination at this time.  Dermatology: Skin is warm and dry  Psych: Unable to assess      DIAGNOSTIC RESULTS     EKG: All EKG's are interpreted by the Emergency Department Physician who either signs or Co-signs this chart in the absence of a cardiologist.    ECG 12 Lead   Final Result   Test Reason : Weak/Dizzy/AMS protocol   Blood Pressure :   */*   mmHG   Vent. Rate :  86 BPM     Atrial Rate :  86 BPM      P-R Int : 176 ms          QRS Dur :  96 ms       QT Int : 362 ms       P-R-T Axes : -18 -39  73 degrees      QTc Int : 433 ms         Normal sinus rhythm   Left axis deviation   Abnormal ECG   No previous ECGs available   Confirmed by ANTHONY MENDOZA MD ([id number]) on " 9/24/2021 11:47:01 AM      Referred By: ED MD           Confirmed By: ANTHONY MENDOZA MD          RADIOLOGY:   Non-plain film images such as CT, Ultrasound and MRI are read by the radiologist. Plain radiographic images are visualized and preliminarily interpreted by the emergency physician with the below findings:      [] Radiologist's Report Reviewed:  CT Chest Without Contrast Diagnostic   Preliminary Result   Increased perihilar markings bilaterally suggesting   bilateral infiltrate with small left pleural effusion. The cardiac   chambers are enlarged with increased stool seen scattered diffusely   throughout the colon. No evidence of obvious obstruction or gross range   peritoneal air.              CT Abdomen Pelvis Without Contrast   Preliminary Result   Increased perihilar markings bilaterally suggesting   bilateral infiltrate with small left pleural effusion. The cardiac   chambers are enlarged with increased stool seen scattered diffusely   throughout the colon. No evidence of obvious obstruction or gross range   peritoneal air.              XR Chest 1 View   Preliminary Result   The heart is enlarged with increased pulmonary vascularity   bilaterally. No evidence of acute parenchymal disease.       D:  09/24/2021   E:  09/24/2021                        ED BEDSIDE ULTRASOUND:   Performed by ED Physician - none    LABS:    I have reviewed and interpreted all of the currently available lab results from this visit (if applicable):  Results for orders placed or performed during the hospital encounter of 09/24/21   COVID-19 and FLU A/B PCR - Swab, Nasopharynx    Specimen: Nasopharynx; Swab   Result Value Ref Range    COVID19 Not Detected Not Detected - Ref. Range    Influenza A PCR Not Detected Not Detected    Influenza B PCR Not Detected Not Detected   Comprehensive Metabolic Panel    Specimen: Blood   Result Value Ref Range    Glucose 162 (H) 65 - 99 mg/dL    BUN 16 8 - 23 mg/dL    Creatinine 0.93 0.76 - 1.27  mg/dL    Sodium 138 136 - 145 mmol/L    Potassium 4.6 3.5 - 5.2 mmol/L    Chloride 99 98 - 107 mmol/L    CO2 29.0 22.0 - 29.0 mmol/L    Calcium 9.0 8.6 - 10.5 mg/dL    Total Protein 8.0 6.0 - 8.5 g/dL    Albumin 3.70 3.50 - 5.20 g/dL    ALT (SGPT) 7 1 - 41 U/L    AST (SGOT) 10 1 - 40 U/L    Alkaline Phosphatase 181 (H) 39 - 117 U/L    Total Bilirubin 0.3 0.0 - 1.2 mg/dL    eGFR Non African Amer 79 >60 mL/min/1.73    Globulin 4.3 gm/dL    A/G Ratio 0.9 g/dL    BUN/Creatinine Ratio 17.2 7.0 - 25.0    Anion Gap 10.0 5.0 - 15.0 mmol/L   Troponin    Specimen: Blood   Result Value Ref Range    Troponin T <0.010 0.000 - 0.030 ng/mL   Magnesium    Specimen: Blood   Result Value Ref Range    Magnesium 1.9 1.6 - 2.4 mg/dL   Urinalysis With Microscopic If Indicated (No Culture) - Urine, Catheter    Specimen: Urine, Catheter   Result Value Ref Range    Color, UA Yellow Yellow, Straw    Appearance, UA Clear Clear    pH, UA 7.5 5.0 - 8.0    Specific Gravity, UA 1.020 1.001 - 1.030    Glucose, UA Negative Negative    Ketones, UA Negative Negative    Bilirubin, UA Negative Negative    Blood, UA Negative Negative    Protein, UA Negative Negative    Leuk Esterase, UA Negative Negative    Nitrite, UA Negative Negative    Urobilinogen, UA 1.0 E.U./dL 0.2 - 1.0 E.U./dL   CBC Auto Differential    Specimen: Blood   Result Value Ref Range    WBC 18.24 (H) 3.40 - 10.80 10*3/mm3    RBC 4.95 4.14 - 5.80 10*6/mm3    Hemoglobin 15.6 13.0 - 17.7 g/dL    Hematocrit 47.2 37.5 - 51.0 %    MCV 95.4 79.0 - 97.0 fL    MCH 31.5 26.6 - 33.0 pg    MCHC 33.1 31.5 - 35.7 g/dL    RDW 13.2 12.3 - 15.4 %    RDW-SD 46.9 37.0 - 54.0 fl    MPV 9.4 6.0 - 12.0 fL    Platelets 260 140 - 450 10*3/mm3    Neutrophil % 83.0 (H) 42.7 - 76.0 %    Lymphocyte % 10.3 (L) 19.6 - 45.3 %    Monocyte % 6.0 5.0 - 12.0 %    Eosinophil % 0.1 (L) 0.3 - 6.2 %    Basophil % 0.2 0.0 - 1.5 %    Immature Grans % 0.4 0.0 - 0.5 %    Neutrophils, Absolute 15.15 (H) 1.70 - 7.00 10*3/mm3     "Lymphocytes, Absolute 1.88 0.70 - 3.10 10*3/mm3    Monocytes, Absolute 1.09 (H) 0.10 - 0.90 10*3/mm3    Eosinophils, Absolute 0.01 0.00 - 0.40 10*3/mm3    Basophils, Absolute 0.03 0.00 - 0.20 10*3/mm3    Immature Grans, Absolute 0.08 (H) 0.00 - 0.05 10*3/mm3    nRBC 0.0 0.0 - 0.2 /100 WBC   Lactic Acid, Plasma    Specimen: Blood   Result Value Ref Range    Lactate 1.9 0.5 - 2.0 mmol/L   BNP    Specimen: Blood   Result Value Ref Range    proBNP 197.8 0.0 - 900.0 pg/mL   Procalcitonin    Specimen: Blood   Result Value Ref Range    Procalcitonin 0.41 (H) 0.00 - 0.25 ng/mL   ECG 12 Lead   Result Value Ref Range    QT Interval 362 ms    QTC Interval 433 ms   Green Top (Gel)   Result Value Ref Range    Extra Tube Hold for add-ons.    Lavender Top   Result Value Ref Range    Extra Tube hold for add-on    Gold Top - SST   Result Value Ref Range    Extra Tube Hold for add-ons.    Light Blue Top   Result Value Ref Range    Extra Tube hold for add-on         All other labs were within normal range or not returned as of this dictation.      EMERGENCY DEPARTMENT COURSE and DIFFERENTIAL DIAGNOSIS/MDM:   Vitals:    Vitals:    09/24/21 1141 09/24/21 1300 09/24/21 1330 09/24/21 1400   BP: 108/58 117/59 103/56 99/60   BP Location:  Right arm Right arm Right arm   Patient Position:  Lying Lying Lying   Pulse: 89 77 73 71   Resp: 22 18 18 18   Temp: 99.3 °F (37.4 °C)      TempSrc: Oral      SpO2: 94% 93% 96% 95%   Weight: 83.9 kg (185 lb)      Height: 180.3 cm (71\")               Patient with multiple comorbidities, underlying seizure disorder, mental delay and neuro disorders who presents with concern for cough congestion, fevers, concern for underlying aspiration pneumonia.  The patient certainly at higher risk for aspiration pneumonitis given that he has diffuse rhonchi, mouth open, likely not tolerating secretions appropriately.  We did obtain IV, labs, imaging for further evaluation.  White count 18.24, left shift, cultures lactic " and broad-spectrum antibiotics were initiated.  Chest x-ray stable, CT scan does reveal bilateral infiltrate pneumonia, cardiomegaly.  We will treat the patient for bilateral pneumonia, aspiration, broad-spectrum antibiotics.  He is requiring extra supplemental oxygen compared to his baseline.  Will plan for admission to the hospital for further work-up treatment and evaluation.  Case discussed with hospitalist team for admission.      MEDICATIONS ADMINISTERED IN ED:  Medications   sodium chloride 0.9 % flush 10 mL (has no administration in time range)   vancomycin 1750 mg/500 mL 0.9% NS IVPB ([facility]) (1,750 mg Intravenous New Bag 21 1435)   piperacillin-tazobactam (ZOSYN) 3.375 g in iso-osmotic dextrose 50 ml (premix) (0 g Intravenous Stopped 21 1454)       PROCEDURES:  Procedures    CRITICAL CARE TIME    Total Critical Care time was 0 minutes, excluding separately reportable procedures.   There was a high probability of clinically significant/life threatening deterioration in the patient's condition which required my urgent intervention.      FINAL IMPRESSION      1. Pneumonia of both lower lobes due to infectious organism    2. Acute on chronic respiratory failure with hypoxia (HCC)          DISPOSITION/PLAN     ED Disposition     ED Disposition Condition Comment    Decision to Admit              Comment: Please note this report has been produced using speech recognition software.      Wilfredo Felton DO  Attending Emergency Physician               Wilfredo Felton DO  21 1531      Electronically signed by Wilfredo Felton DO at 21 1531          Physician Progress Notes (last 72 hours)      Trinidad Ingram APRN at 10/17/21 0842              [facility] Medicine Services  PROGRESS NOTE    Patient Name: Rao Alcazar  : 1945  MRN: [id number]    Date of Admission: 2021  Primary Care Physician: Provider, No Known    Subjective   Subjective      CC:  F/U dysphagia     HPI:  Chart reviewed.  Large BM overnight.  Eating 100% of his meals with good appetite.  No overnight issues.     ROS:  Unable to obtain a reliable ROS due to mental status, which appears to be his baseline.     Objective   Objective     Vital Signs:   Temp:  [99.4 °F (37.4 °C)] 99.4 °F (37.4 °C)  Heart Rate:  [85] 85  Resp:  [16] 16  BP: (142)/(63) 142/63     Physical Exam:  Constitutional: No acute distress, awake, alert, resting comfortably in bed, nurse at bedside  HENT: NCAT, mucous membranes moist, edentulous  Respiratory: Clear to auscultation bilaterally, respiratory effort normal on room air  Cardiovascular: RRR, no murmurs, rubs, or gallops  Gastrointestinal: Positive bowel sounds, soft, nontender, nondistended  Musculoskeletal: No bilateral ankle edema  Psychiatric: Appropriate affect, cooperative  Neurologic: Mental status appears baseline, Moves all extremties, speech difficult to understand  Skin: No rashes noted to exposed skin       Results Reviewed:  LAB RESULTS:          Lab 10/11/21  0521   SODIUM 136   POTASSIUM 4.2   CHLORIDE 100   CO2 27.0   ANION GAP 9.0   BUN 7*   CREATININE 0.44*   GLUCOSE 100*   CALCIUM 9.1   MAGNESIUM 1.9   PHOSPHORUS 2.7         Lab 10/11/21  0521   TOTAL PROTEIN 7.9   ALBUMIN 3.70   ALT (SGPT) 26   AST (SGOT) 26   BILIRUBIN 0.2   ALK PHOS 143*             Lab 10/11/21  0521   CHOLESTEROL 148   TRIGLYCERIDES 177*             Brief Urine Lab Results  (Last result in the past 365 days)      Color   Clarity   Blood   Leuk Est   Nitrite   Protein   CREAT   Urine HCG        09/24/21 1203 Yellow   Clear   Negative   Negative   Negative   Negative                 Microbiology Results Abnormal     Procedure Component Value - Date/Time    Blood Culture - Blood, Arm, Right [[id number]] Collected: 09/24/21 1150    Lab Status: Final result Specimen: Blood from Arm, Right Updated: 09/29/21 1231     Blood Culture No growth at 5 days    Blood Culture - Blood,  Arm, Left [[id number]] Collected: 09/24/21 1150    Lab Status: Final result Specimen: Blood from Arm, Left Updated: 09/29/21 1231     Blood Culture No growth at 5 days    COVID PRE-OP / PRE-PROCEDURE SCREENING ORDER (NO ISOLATION) - Swab, Nasopharynx [[id number]]  (Normal) Collected: 09/24/21 1208    Lab Status: Final result Specimen: Swab from Nasopharynx Updated: 09/24/21 1236    Narrative:      The following orders were created for panel order COVID PRE-OP / PRE-PROCEDURE SCREENING ORDER (NO ISOLATION) - Swab, Nasopharynx.  Procedure                               Abnormality         Status                     ---------                               -----------         ------                     COVID-19 and FLU A/B PCR...[[id number]]  Normal              Final result                 Please view results for these tests on the individual orders.    COVID-19 and FLU A/B PCR - Swab, Nasopharynx [[id number]]  (Normal) Collected: 09/24/21 1208    Lab Status: Final result Specimen: Swab from Nasopharynx Updated: 09/24/21 1236     COVID19 Not Detected     Influenza A PCR Not Detected     Influenza B PCR Not Detected    Narrative:      Fact sheet for providers: https://www.fda.gov/media/327089/download    Fact sheet for patients: https://www.fda.gov/media/151635/download    Test performed by PCR.          No radiology results from the last 24 hrs    Results for orders placed during the hospital encounter of 09/24/21    Adult Transthoracic Echo Complete W/ Cont if Necessary Per Protocol    Interpretation Summary  · Normal left ventricular systolic function, estimated EF 60%.  · Aortic sclerosis without aortic stenosis.  · Trace aortic insufficiency.  · Trace mitral regurgitation, trace tricuspid regurgitation, normal RVSP.      I have reviewed the medications:  Scheduled Meds:aspirin, 81 mg, Oral, Daily   Or  aspirin, 300 mg, Rectal, Daily  atorvastatin, 10 mg, Oral, Nightly  castor oil-balsam peru, 1 application, Topical,  Q12H  heparin (porcine), 5,000 Units, Subcutaneous, Q8H  levETIRAcetam, 2,000 mg, Oral, Q12H  levothyroxine, 25 mcg, Oral, Q AM  palliative care oral rinse, , Mouth/Throat, TID - RT  phenytoin, 125 mg, Oral, TID  senna-docusate sodium, 2 tablet, Oral, BID      Continuous Infusions:   PRN Meds:.•  acetaminophen  •  senna-docusate sodium **AND** polyethylene glycol **AND** bisacodyl **AND** bisacodyl    Assessment/Plan   Assessment & Plan     Active Hospital Problems    Diagnosis  POA   • Pneumonia of both lower lobes due to infectious organism [J18.9]  Yes   • Chronic diastolic CHF (congestive heart failure) (HCC) [I50.32]  Unknown   • Hyperlipidemia [E78.5]  Yes   • Mental disorder [F99]  Yes   • History of seizure disorder [Z86.69]  Not Applicable   • Cardiomegaly [I51.7]  Yes      Resolved Hospital Problems   No resolved problems to display.        Brief Hospital Course to date:  Rao Alcazar is a 76 y.o. male patient with severe intellectual disability, nonverbal at baseline (guardian of state), with history of dyslipidemia, seizure disorder and chronic hypoxic respiratory failure on home oxygen at 2 L/min presented to the hospital with fever and cough and was found to have bilateral basal pneumonia.     This patient's problems and plans were partially entered by my partner and updated as appropriate by me 10/17/21.     GOC  -Pt DNR/DNI. He is on a comfort diet. Plan is LTC with hospice.     Aspiration pneumonia  Dysphagia   -CT chest showed bilateral pulmonary infiltrates and pleural effusion, concerning for community acquired pneumonia  -Has completed course of IV abx.  -Continue comfort diet. Patient with good appetite, eating 100% of his meals.      Seizure disorder  -Continue Keppra and Dilantin  -Seizure precautions     Diastolic heart failure, not in decompensation  -echo EF 60%, aortic stenosis, normal RVSP   -Compensated. Continue aspirin     Hyperlipidemia  -Continue statin     Severe intellectual  disability   --Stable at baseline    DVT prophylaxis:  Medical DVT prophylaxis orders are present.       AM-PAC 6 Clicks Score (PT): 8 (10/16/21 0800)    Disposition: I expect the patient to be discharged to LTC with hospice when arranged. Awaiting calls back from facilities.       CODE STATUS:   Code Status and Medical Interventions:   Ordered at: 10/05/21 1420     Code Status:    No CPR     Medical Interventions (Level of Support Prior to Arrest):    Comfort Measures       MONIKA Venegas  10/17/21                Electronically signed by Trinidad Ingram APRN at 10/17/21 1224     Jing Tran APRN at 10/16/21 1550              [facility] Medicine Services  PROGRESS NOTE    Patient Name: Rao Alcazar  : 1945  MRN: [id number]    Date of Admission: 2021  Primary Care Physician: Provider, No Known    Subjective   Subjective     CC:  F/U dysphagia     HPI:  No new concerns from staff. Patient appears comfortable in bed. Asking for pudding.     ROS:  UTO a reliable ROS     Objective   Objective     Vital Signs:   Temp:  [98.3 °F (36.8 °C)] 98.3 °F (36.8 °C)  Heart Rate:  [87] 87  Resp:  [16] 16  BP: (124)/(67) 124/67     Physical Exam:  Constitutional: No acute distress, awake, alert sitting upright in bed   HENT: NCAT, mucous membranes moist, edentulous   Respiratory: Clear to auscultation bilaterally, respiratory effort normal   Cardiovascular: RRR, no murmurs, rubs, or gallops  Gastrointestinal: Positive bowel sounds, soft, nontender, nondistended  Musculoskeletal: No bilateral ankle edema  Psychiatric: Appropriate affect, cooperative, calm   Neurologic: CRABTREE, speech difficult to understand  Skin: No rashes  No change from 10/15    Results Reviewed:  LAB RESULTS:          Lab 10/11/21  0521   SODIUM 136   POTASSIUM 4.2   CHLORIDE 100   CO2 27.0   ANION GAP 9.0   BUN 7*   CREATININE 0.44*   GLUCOSE 100*   CALCIUM 9.1   MAGNESIUM 1.9   PHOSPHORUS 2.7         Lab 10/11/21  0521    TOTAL PROTEIN 7.9   ALBUMIN 3.70   ALT (SGPT) 26   AST (SGOT) 26   BILIRUBIN 0.2   ALK PHOS 143*             Lab 10/11/21  0521   CHOLESTEROL 148   TRIGLYCERIDES 177*             Brief Urine Lab Results  (Last result in the past 365 days)      Color   Clarity   Blood   Leuk Est   Nitrite   Protein   CREAT   Urine HCG        09/24/21 1203 Yellow   Clear   Negative   Negative   Negative   Negative                 Microbiology Results Abnormal     Procedure Component Value - Date/Time    Blood Culture - Blood, Arm, Right [[id number]] Collected: 09/24/21 1150    Lab Status: Final result Specimen: Blood from Arm, Right Updated: 09/29/21 1231     Blood Culture No growth at 5 days    Blood Culture - Blood, Arm, Left [[id number]] Collected: 09/24/21 1150    Lab Status: Final result Specimen: Blood from Arm, Left Updated: 09/29/21 1231     Blood Culture No growth at 5 days    COVID PRE-OP / PRE-PROCEDURE SCREENING ORDER (NO ISOLATION) - Swab, Nasopharynx [[id number]]  (Normal) Collected: 09/24/21 1208    Lab Status: Final result Specimen: Swab from Nasopharynx Updated: 09/24/21 1236    Narrative:      The following orders were created for panel order COVID PRE-OP / PRE-PROCEDURE SCREENING ORDER (NO ISOLATION) - Swab, Nasopharynx.  Procedure                               Abnormality         Status                     ---------                               -----------         ------                     COVID-19 and FLU A/B PCR...[[id number]]  Normal              Final result                 Please view results for these tests on the individual orders.    COVID-19 and FLU A/B PCR - Swab, Nasopharynx [[id number]]  (Normal) Collected: 09/24/21 1208    Lab Status: Final result Specimen: Swab from Nasopharynx Updated: 09/24/21 1236     COVID19 Not Detected     Influenza A PCR Not Detected     Influenza B PCR Not Detected    Narrative:      Fact sheet for providers: https://www.fda.gov/media/919695/download    Fact sheet for patients:  https://www.fda.gov/media/583429/download    Test performed by PCR.          No radiology results from the last 24 hrs    Results for orders placed during the hospital encounter of 09/24/21    Adult Transthoracic Echo Complete W/ Cont if Necessary Per Protocol    Interpretation Summary  · Normal left ventricular systolic function, estimated EF 60%.  · Aortic sclerosis without aortic stenosis.  · Trace aortic insufficiency.  · Trace mitral regurgitation, trace tricuspid regurgitation, normal RVSP.      I have reviewed the medications:  Scheduled Meds:aspirin, 81 mg, Oral, Daily   Or  aspirin, 300 mg, Rectal, Daily  atorvastatin, 10 mg, Oral, Nightly  castor oil-balsam peru, 1 application, Topical, Q12H  heparin (porcine), 5,000 Units, Subcutaneous, Q8H  levETIRAcetam, 2,000 mg, Oral, Q12H  levothyroxine, 25 mcg, Oral, Q AM  palliative care oral rinse, , Mouth/Throat, TID - RT  phenytoin, 125 mg, Oral, TID  senna-docusate sodium, 2 tablet, Oral, BID      Continuous Infusions:   PRN Meds:.•  acetaminophen  •  senna-docusate sodium **AND** polyethylene glycol **AND** bisacodyl **AND** bisacodyl    Assessment/Plan   Assessment & Plan     Active Hospital Problems    Diagnosis  POA   • Pneumonia of both lower lobes due to infectious organism [J18.9]  Yes   • Chronic diastolic CHF (congestive heart failure) (HCC) [I50.32]  Unknown   • Hyperlipidemia [E78.5]  Yes   • Mental disorder [F99]  Yes   • History of seizure disorder [Z86.69]  Not Applicable   • Cardiomegaly [I51.7]  Yes      Resolved Hospital Problems   No resolved problems to display.        Brief Hospital Course to date:  Rao Alcazar is a 76 y.o. male patient with severe intellectual disability, nonverbal at baseline (guardian of state), with history of dyslipidemia, seizure disorder and chronic hypoxic respiratory failure on home oxygen at 2 L/min presented to the hospital with fever and cough and was found to have bilateral basal pneumonia.     This patient's  problems and plans were partially entered by my partner and updated as appropriate by me 10/16/21.     GOC  -Pt DNR/DNI. He is on a comfort diet. Plan is LTC with hospice.     Aspiration pneumonia  Dysphagia   -CT chest showed bilateral pulmonary infiltrates and pleural effusion, concerning for community acquired pneumonia  -Has completed course of IV abx.  -Continue comfort diet.     Seizure disorder  -Continue Keppra and Dilantin  -Seizure precautions     Diastolic heart failure, not in decompensation  -echo EF 60%, aortic stenosis, normal RVSP   -Compensated. Continue aspirin     Hyperlipidemia  -Continue statin     Severe intellectual disability   --Stable to baseline    DVT prophylaxis:  Medical DVT prophylaxis orders are present.       AM-PAC 6 Clicks Score (PT): 8 (10/16/21 0800)    Disposition: I expect the patient to be discharged to LTC with hospice when arranged.     CODE STATUS:   Code Status and Medical Interventions:   Ordered at: 10/05/21 1420     Code Status:    No CPR     Medical Interventions (Level of Support Prior to Arrest):    Comfort Measures       MONIKA Zaragoza  10/16/21                Electronically signed by Jing Tran APRN at 10/16/21 1552     Jing Tran APRN at 10/15/21 1435              [facility] Medicine Services  PROGRESS NOTE    Patient Name: Rao Alcazar  : 1945  MRN: [id number]    Date of Admission: 2021  Primary Care Physician: Provider, No Known    Subjective   Subjective     CC:  F/U dysphagia     HPI:  Patient alone in room watching TV. NAD. No new concerns from staff. Asking for pudding and bubblegum.     ROS:  UTO a reliable ROS     Objective   Objective     Vital Signs:   Temp:  [97.7 °F (36.5 °C)] 97.7 °F (36.5 °C)  Heart Rate:  [89] 89  Resp:  [18] 18  BP: (139)/(74) 139/74     Physical Exam:  Constitutional: No acute distress, awake, alert sitting upright in bed   HENT: NCAT, mucous membranes moist, edentulous    Respiratory: Clear to auscultation bilaterally, respiratory effort normal   Cardiovascular: RRR, no murmurs, rubs, or gallops  Gastrointestinal: Positive bowel sounds, soft, nontender, nondistended  Musculoskeletal: No bilateral ankle edema  Psychiatric: Appropriate affect, cooperative, calm   Neurologic: CRABTREE, speech difficult to understand  Skin: No rashes    Results Reviewed:  LAB RESULTS:          Lab 10/11/21  0521   SODIUM 136   POTASSIUM 4.2   CHLORIDE 100   CO2 27.0   ANION GAP 9.0   BUN 7*   CREATININE 0.44*   GLUCOSE 100*   CALCIUM 9.1   MAGNESIUM 1.9   PHOSPHORUS 2.7         Lab 10/11/21  0521   TOTAL PROTEIN 7.9   ALBUMIN 3.70   ALT (SGPT) 26   AST (SGOT) 26   BILIRUBIN 0.2   ALK PHOS 143*             Lab 10/11/21  0521   CHOLESTEROL 148   TRIGLYCERIDES 177*             Brief Urine Lab Results  (Last result in the past 365 days)      Color   Clarity   Blood   Leuk Est   Nitrite   Protein   CREAT   Urine HCG        09/24/21 1203 Yellow   Clear   Negative   Negative   Negative   Negative                 Microbiology Results Abnormal     Procedure Component Value - Date/Time    Blood Culture - Blood, Arm, Right [[id number]] Collected: 09/24/21 1150    Lab Status: Final result Specimen: Blood from Arm, Right Updated: 09/29/21 1231     Blood Culture No growth at 5 days    Blood Culture - Blood, Arm, Left [[id number]] Collected: 09/24/21 1150    Lab Status: Final result Specimen: Blood from Arm, Left Updated: 09/29/21 1231     Blood Culture No growth at 5 days    COVID PRE-OP / PRE-PROCEDURE SCREENING ORDER (NO ISOLATION) - Swab, Nasopharynx [[id number]]  (Normal) Collected: 09/24/21 1208    Lab Status: Final result Specimen: Swab from Nasopharynx Updated: 09/24/21 1236    Narrative:      The following orders were created for panel order COVID PRE-OP / PRE-PROCEDURE SCREENING ORDER (NO ISOLATION) - Swab, Nasopharynx.  Procedure                               Abnormality         Status                      ---------                               -----------         ------                     COVID-19 and FLU A/B PCR...[[id number]]  Normal              Final result                 Please view results for these tests on the individual orders.    COVID-19 and FLU A/B PCR - Swab, Nasopharynx [[id number]]  (Normal) Collected: 09/24/21 1208    Lab Status: Final result Specimen: Swab from Nasopharynx Updated: 09/24/21 1236     COVID19 Not Detected     Influenza A PCR Not Detected     Influenza B PCR Not Detected    Narrative:      Fact sheet for providers: https://www.fda.gov/media/204684/download    Fact sheet for patients: https://www.fda.gov/media/680389/download    Test performed by PCR.          No radiology results from the last 24 hrs    Results for orders placed during the hospital encounter of 09/24/21    Adult Transthoracic Echo Complete W/ Cont if Necessary Per Protocol    Interpretation Summary  · Normal left ventricular systolic function, estimated EF 60%.  · Aortic sclerosis without aortic stenosis.  · Trace aortic insufficiency.  · Trace mitral regurgitation, trace tricuspid regurgitation, normal RVSP.      I have reviewed the medications:  Scheduled Meds:aspirin, 81 mg, Oral, Daily   Or  aspirin, 300 mg, Rectal, Daily  atorvastatin, 10 mg, Oral, Nightly  castor oil-balsam peru, 1 application, Topical, Q12H  heparin (porcine), 5,000 Units, Subcutaneous, Q8H  levETIRAcetam, 2,000 mg, Oral, Q12H  levothyroxine, 25 mcg, Oral, Q AM  palliative care oral rinse, , Mouth/Throat, TID - RT  phenytoin, 125 mg, Oral, TID  senna-docusate sodium, 2 tablet, Oral, BID      Continuous Infusions:   PRN Meds:.•  acetaminophen  •  senna-docusate sodium **AND** polyethylene glycol **AND** bisacodyl **AND** bisacodyl    Assessment/Plan   Assessment & Plan     Active Hospital Problems    Diagnosis  POA   • Pneumonia of both lower lobes due to infectious organism [J18.9]  Yes   • Chronic diastolic CHF (congestive heart failure)  ([facility]) [I50.32]  Unknown   • Hyperlipidemia [E78.5]  Yes   • Mental disorder [F99]  Yes   • History of seizure disorder [Z86.69]  Not Applicable   • Cardiomegaly [I51.7]  Yes      Resolved Hospital Problems   No resolved problems to display.        Brief Hospital Course to date:  Rao Alcazar is a 76 y.o. male patient with severe intellectual disability, nonverbal at baseline (guardian of state), with history of dyslipidemia, seizure disorder and chronic hypoxic respiratory failure on home oxygen at 2 L/min presented to the hospital with fever and cough and was found to have bilateral basal pneumonia.     This patient's problems and plans were partially entered by my partner and updated as appropriate by me 10/15/21.  All problems are new to me today      GOC  -Pt DNR/DNI. He is on a comfort diet. Plan is LTC with hospice.     Aspiration pneumonia  Dysphagia   -CT chest showed bilateral pulmonary infiltrates and pleural effusion, concerning for community acquired pneumonia  -Has completed course of IV abx.  -Continue comfort diet.     Seizure disorder  -Continue Keppra and Dilantin  -Seizure precautions     Diastolic heart failure, not in decompensation  -echo EF 60%, aortic stenosis, normal RVSP   -Compensated. Continue aspirin     Hyperlipidemia  -Continue statin     Severe intellectual disability   --Stable to baseline    DVT prophylaxis:  Medical DVT prophylaxis orders are present.       AM-PAC 6 Clicks Score (PT): 8 (10/15/21 0800)    Disposition: I expect the patient to be discharged to LTC with hospice when arranged.     CODE STATUS:   Code Status and Medical Interventions:   Ordered at: 10/05/21 1420     Code Status:    No CPR     Medical Interventions (Level of Support Prior to Arrest):    Comfort Measures       MONIKA Zaragoza  10/15/21                Electronically signed by Jing Tran APRN at 10/15/21 1440       Consult Notes (last 72 hours)  Notes from 10/15/21 1131 through 10/18/21 1131    No notes of this type exist for this encounter.

## 2021-10-18 NOTE — PROGRESS NOTES
Continued Stay Note  [facility]     Patient Name: Rao Alcazar  MRN: [id number]  Today's Date: 10/18/2021    Admit Date: 9/24/2021     Discharge Plan     Row Name 10/18/21 1228       Plan    Plan LTC with hospice    Plan Comments Per conversation with NALINI, pt has been accepted to [facility].  Pt to dc via ambulance tomorrow, October 19, at 16:00.  Per chart review, pt has history of O2 use but does not currently require it.  As pt is state guardian, EMS/DNR has been faxed for signature.  Once EMS/DNR has been sent back, I will place it on the chart.  Please call [phone] if I can be of further assistance.    Row Name 10/18/21 1140       Plan    Plan Comments MSW was contacted by Nicole at [facility]. Pt has been offered a bed there tomorrow. Pt will need a new Covid test. Ambulance has been arranged for 4:00pm tomorrow (Tuesday 10/19). Pt will also need a PASRR and MSW will assist with this form to be signed by physician and faxed to [facility].               Discharge Codes    No documentation.               Expected Discharge Date and Time     Expected Discharge Date Expected Discharge Time    Oct 15, 2021             Fernando Patricia RN

## 2021-10-18 NOTE — PLAN OF CARE
Goal Outcome Evaluation:               Patient awake throughout shift. Unable to understand him when he speaks. Incontinent.  Awaiting Placement.

## 2021-10-19 PROCEDURE — 25010000002 HEPARIN (PORCINE) PER 1000 UNITS: Performed by: NURSE PRACTITIONER

## 2021-10-19 PROCEDURE — 99238 HOSP IP/OBS DSCHRG MGMT 30/<: CPT | Performed by: INTERNAL MEDICINE

## 2021-10-19 RX ORDER — PHENYTOIN 125 MG/5ML
125 SUSPENSION ORAL 3 TIMES DAILY
Start: 2021-10-19

## 2021-10-19 RX ADMIN — CASTOR OIL AND BALSAM, PERU 1 APPLICATION: 788; 87 OINTMENT TOPICAL at 09:10

## 2021-10-19 RX ADMIN — MINERAL OIL: 1000 LIQUID ORAL at 14:57

## 2021-10-19 RX ADMIN — ASPIRIN 81 MG CHEWABLE TABLET 81 MG: 81 TABLET CHEWABLE at 09:08

## 2021-10-19 RX ADMIN — LEVETIRACETAM 2000 MG: 100 SOLUTION ORAL at 11:00

## 2021-10-19 RX ADMIN — DOCUSATE SODIUM 50 MG AND SENNOSIDES 8.6 MG 2 TABLET: 8.6; 5 TABLET, FILM COATED ORAL at 09:08

## 2021-10-19 RX ADMIN — HEPARIN SODIUM 5000 UNITS: 5000 INJECTION, SOLUTION INTRAVENOUS; SUBCUTANEOUS at 14:57

## 2021-10-19 RX ADMIN — HEPARIN SODIUM 5000 UNITS: 5000 INJECTION, SOLUTION INTRAVENOUS; SUBCUTANEOUS at 05:08

## 2021-10-19 RX ADMIN — PHENYTOIN 125 MG: 125 SUSPENSION ORAL at 09:08

## 2021-10-19 RX ADMIN — LEVOTHYROXINE SODIUM 25 MCG: 25 TABLET ORAL at 05:08

## 2021-10-19 RX ADMIN — MINERAL OIL: 1000 LIQUID ORAL at 09:10

## 2021-10-19 NOTE — PROGRESS NOTES
Continued Stay Note  [facility]     Patient Name: Rao Alcazar  MRN: [id number]  Today's Date: 10/19/2021    Admit Date: 9/24/2021     Discharge Plan     Row Name 10/19/21 1039       Plan    Plan LTC with hospice    Plan Comments Plan remains for pt to dc to [facility] today for LTC with hospice.  Home hospice team working with guardian for hospice admission.  Awaiting return of EMS/DNR for transport, if it is not received pt will transport as a full code; explained this to guardian yesterday who verbalized understanding.  Please, call [phone] if I can be of further assistance.               Discharge Codes    No documentation.               Expected Discharge Date and Time     Expected Discharge Date Expected Discharge Time    Oct 19, 2021             Fernando Patricia RN

## 2021-10-19 NOTE — PLAN OF CARE
Goal Outcome Evaluation:  Plan of Care Reviewed With: patient        Progress: no change  Outcome Summary: VSS. PAtient had adequate UOP and a BM during the night. Remains on dysphagia two diet. Will continue to monitor.

## 2021-10-19 NOTE — CASE MANAGEMENT/SOCIAL WORK
Case Management Discharge Note      Final Note: MSW VErified with Coretta at [facility] that pt still has a bed and good to discharge to their facility today via ambulance at 4:00pm. MSW faxed PASRR, discharge summary, and negative COvid test to [facility]. Report to be called to [phone]. Pt's state Guardian Veronica, aware of discharge today to [facility] as well. [facility] form in pt's chartlett.         Selected Continued Care - Admitted Since 9/24/2021     Destination    No services have been selected for the patient.              Durable Medical Equipment    No services have been selected for the patient.              Dialysis/Infusion    No services have been selected for the patient.              Home Medical Care    No services have been selected for the patient.              Therapy    No services have been selected for the patient.              Community Resources    No services have been selected for the patient.              Community & DME    No services have been selected for the patient.                       Final Discharge Disposition Code: 51 - hospice medical facility

## 2021-10-19 NOTE — DISCHARGE PLACEMENT REQUEST
"Rao Reyes (76 y.o. Male)             Date of Birth Social Security Number Address Home Phone MRN    1945  [address] [facility] [address] [phone] [id number]    T[employer] Marital Status             Non-Scientology Single       Admission Date Admission Type Admitting Provider Attending Provider Department, Room/Bed    9/24/21 Emergency Sapphire Rose MD Hunter, Sarah M, MD [facility] 5B, N538/1    Discharge Date Discharge Disposition Discharge Destination           Home or Self Care              Attending Provider: Sapphire Rose MD    Allergies: No Known Allergies    Isolation: None   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)   Code Status: No CPR   Advance Care Planning Activity    Ht: 180 cm (70.87\")   Wt: 83.9 kg (185 lb)    Admission Cmt: None   Principal Problem: None                Active Insurance as of 9/24/2021     Primary Coverage     Payor Plan Insurance Group Employer/Plan Group    MEDICARE MEDICARE A & B      Payor Plan Address Payor Plan Phone Number Payor Plan Fax Number Effective Dates    [address] [phone]  6/1/2011 - None Entered    [facility] [address]       Subscriber Name Subscriber Birth Date Member ID       RAO REYES 1945 [id number]           Secondary Coverage     Payor Plan Insurance Group Employer/Plan Group    [place]      Payor Plan Address Payor Plan Phone Number Payor Plan Fax Number Effective Dates    [address] [phone]  9/28/2017 - None Entered    [place] [address]       Subscriber Name Subscriber Birth Date Member ID       RAO REYES 1945 [id number]                 Emergency Contacts      (Rel.) Home Phone Work Phone Mobile Phone    FRANTZ JARQUIN (Guardian) -- [phone]                 Discharge Summary      Sapphire Rose MD at 10/19/21 1033              [facility]  DISCHARGE SUMMARY    Patient Name: Rao HERNANDEZ " Ottoniel  : 1945  MRN: [id number]    Date of Admission: 2021 11:41 AM  Date of Discharge:  10/19/2021  Primary Care Physician: Provider, No Known    Consults     Date and Time Order Name Status Description    2021  1:12 PM Inpatient Palliative Care MD Consult Completed           Hospital Course     Presenting Problem:   Pneumonia of both lower lobes due to infectious organism [J18.9]    Active Hospital Problems    Diagnosis  POA   • Pneumonia of both lower lobes due to infectious organism [J18.9]  Yes   • Chronic diastolic CHF (congestive heart failure) ([facility]) [I50.32]  Unknown   • Hyperlipidemia [E78.5]  Yes   • Mental disorder [F99]  Yes   • History of seizure disorder [Z86.69]  Not Applicable   • Cardiomegaly [I51.7]  Yes      Resolved Hospital Problems   No resolved problems to display.          Hospital Course:  Rao Alcazar is a 76 y.o. male patient with severe intellectual disability, nonverbal at baseline ([facility]), with history of dyslipidemia, seizure disorder and chronic hypoxic respiratory failure on home oxygen at 2 L/min presented to the hospital with fever and cough and was found to have bilateral basal pneumonia. Ultimately patient was discharged to facility with hospice 10/19/21     This patient's problems and plans were partially entered by my partner and updated as appropriate by me 10/18/21.     GOC  -Pt DNR/DNI. He is on a comfort diet. Plan is LTC with hospice.     Aspiration pneumonia  Dysphagia   -CT chest showed bilateral pulmonary infiltrates and pleural effusion, concerning for community acquired pneumonia  -Has completed course of IV abx.  -Continue comfort diet. Patient with good appetite, eating 100% of his meals.      Seizure disorder  -Continue Keppra and Dilantin  -Seizure precautions     Diastolic heart failure  -echo EF 60%, aortic stenosis, normal RVSP   -Compensated. Continue aspirin     Hyperlipidemia  -Continue statin     Severe intellectual disability    --Stable at baseline         Discharge Follow Up Recommendations for outpatient labs/diagnostics:  Per hospice    Day of Discharge     HPI:   Resting in bed. Speech unintelligible.     Review of Systems  uto    Vital Signs:   Temp:  [98 °F (36.7 °C)] 98 °F (36.7 °C)  Heart Rate:  [81] 81  Resp:  [20] 20  BP: (155)/(74) 155/74     Physical Exam:  GEN: NAD, resting in bed, awake  HEENT: on room air, atraumatic, normocephalic  NECK: supple, no masses  RESP: on room air, normal effort  CV: on tele, sinus rhythm  PSYCH: normal affect, appropriate  NEURO: awake, alert, no focal deficits noted  MSK: no edema noted  SKIN: no rashes noted       Pertinent  and/or Most Recent Results     LAB RESULTS:                              Brief Urine Lab Results  (Last result in the past 365 days)      Color   Clarity   Blood   Leuk Est   Nitrite   Protein   CREAT   Urine HCG        09/24/21 1203 Yellow   Clear   Negative   Negative   Negative   Negative               Microbiology Results (last 10 days)     Procedure Component Value - Date/Time    COVID PRE-OP / PRE-PROCEDURE SCREENING ORDER (NO ISOLATION) - Swab, Nasopharynx [[id number]]  (Normal) Collected: 10/18/21 1543    Lab Status: Final result Specimen: Swab from Nasopharynx Updated: 10/18/21 1704    Narrative:      The following orders were created for panel order COVID PRE-OP / PRE-PROCEDURE SCREENING ORDER (NO ISOLATION) - Swab, Nasopharynx.  Procedure                               Abnormality         Status                     ---------                               -----------         ------                     COVID-19,CEPHEID/EARNEST,LE...[[id number]]  Normal              Final result                 Please view results for these tests on the individual orders.    COVID-19,CEPHEID/EARNEST,MASON IN-HOUSE(OR EMERGENT/ADD-ON),NP SWAB IN TRANSPORT MEDIA 3-4 HR TAT - Swab, Nasopharynx [[id number]]  (Normal) Collected: 10/18/21 7123    Lab Status: Final result Specimen: Swab from  Nasopharynx Updated: 10/18/21 1704     COVID19 Not Detected    Narrative:      Fact sheet for providers: https://www.fda.gov/media/703424/download     Fact sheet for patients: https://www.fda.gov/media/731335/download  Fact sheet for providers: https://www.fda.gov/media/344325/download     Fact sheet for patients: https://www.fda.gov/media/403863/download          No radiology results for the last 10 days            Results for orders placed during the hospital encounter of 09/24/21    Adult Transthoracic Echo Complete W/ Cont if Necessary Per Protocol    Interpretation Summary  · Normal left ventricular systolic function, estimated EF 60%.  · Aortic sclerosis without aortic stenosis.  · Trace aortic insufficiency.  · Trace mitral regurgitation, trace tricuspid regurgitation, normal RVSP.      Plan for Follow-up of Pending Labs/Results: na    Discharge Details        Discharge Medications      New Medications      Instructions Start Date   PALLIATIVE CARE ORAL RINSE (MASON)   90 mL, Mouth/Throat, 3 Times Daily - RT      phenytoin 125 MG/5ML suspension  Commonly known as: DILANTIN   125 mg, Oral, 3 Times Daily         Continue These Medications      Instructions Start Date   aspirin 81 MG tablet   1 tablet, Oral, Daily      CALMOSEPTINE EX   Apply externally      guaiFENesin 600 MG 12 hr tablet  Commonly known as: MUCINEX   600 mg, Oral, 2 Times Daily      Keppra 1000 MG tablet  Generic drug: levETIRAcetam   2,000 mg, Oral, 2 Times Daily      MIRALAX PO   17 g, Oral, Daily      omeprazole 20 MG capsule  Commonly known as: priLOSEC   20 mg, Oral, Daily      Synthroid 25 MCG tablet  Generic drug: levothyroxine   1 tablet, Oral, Daily      Zocor 20 MG tablet  Generic drug: simvastatin   1 tablet, Oral, Daily         Stop These Medications    Calcium 600 + D 600-200 MG-UNIT tablet  Generic drug: Calcium Carb-Cholecalciferol     Flomax 0.4 MG capsule 24 hr capsule  Generic drug: tamsulosin     hydrOXYzine pamoate 25 MG  capsule  Commonly known as: VISTARIL     KlonoPIN 0.5 MG tablet  Generic drug: clonazePAM     montelukast 10 MG tablet  Commonly known as: SINGULAIR     Onfi 10 MG tablet  Generic drug: cloBAZam     phenytoin extended 200 MG ER capsule  Commonly known as: DILANTIN            No Known Allergies      Discharge Disposition:  Home or Self Care    Diet:  Hospital:  Diet Order   Procedures   • Diet Dysphagia; II - Pureed; Nectar / Syrup Thick; No Straws; Cardiac       Activity:  Per hopice    Restrictions or Other Recommendations:  Per hospice       CODE STATUS:    Code Status and Medical Interventions:   Ordered at: 10/05/21 1420     Code Status:    No CPR     Medical Interventions (Level of Support Prior to Arrest):    Comfort Measures       No future appointments.    Additional Instructions for the Follow-ups that You Need to Schedule     Discharge Follow-up with Specified Provider: hospice   As directed      To: hospice                     Sapphire Rose MD  10/19/21      Time Spent on Discharge:  I spent  25  minutes on this discharge activity which included: face-to-face encounter with the patient, reviewing the data in the system, coordination of the care with the nursing staff as well as consultants, documentation, and entering orders.            Electronically signed by Sapphire Rose MD at 10/19/21 5347

## 2021-10-19 NOTE — DISCHARGE PLACEMENT REQUEST
"Rao Reyes (76 y.o. Male)             Date of Birth Social Security Number Address Home Phone MRN    1945  [address] [facility] [id number] [phone] [id number]    Alevism Marital Status             Non-Hinduism Single       Admission Date Admission Type Admitting Provider Attending Provider Department, Room/Bed    9/24/21 Emergency Sapphire Rose MD Hunter, Sarah M, MD [facility] 5B, N538/1    Discharge Date Discharge Disposition Discharge Destination           Home or Self Care              Attending Provider: Sapphire Rose MD    Allergies: No Known Allergies    Isolation: None   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)   Code Status: No CPR   Advance Care Planning Activity    Ht: 180 cm (70.87\")   Wt: 83.9 kg (185 lb)    Admission Cmt: None   Principal Problem: None                Active Insurance as of 9/24/2021     Primary Coverage     Payor Plan Insurance Group Employer/Plan Group    MEDICARE MEDICARE A & B      Payor Plan Address Payor Plan Phone Number Payor Plan Fax Number Effective Dates    [address] [phone]  6/1/2011 - None Entered    [facility] [id number]       Subscriber Name Subscriber Birth Date Member ID       RAO REYES 1945 [id number]           Secondary Coverage     Payor Plan Insurance Group Employer/Plan Group    [place]      Payor Plan Address Payor Plan Phone Number Payor Plan Fax Number Effective Dates    [address] [phone]  9/28/2017 - None Entered    [place] [address]       Subscriber Name Subscriber Birth Date Member ID       RAO REYES 1945 [id number]                 Emergency Contacts      (Rel.) Home Phone Work Phone Mobile Phone    FRANTZ JARQIUN (Guardian) -- [phone]                 Discharge Summary      Sapphire Rose MD at 10/19/21 1033              [facility] Medicine [facility]  DISCHARGE SUMMARY    Patient Name: Rao HERNANDEZ " Ottoniel  : 1945  MRN: [id number]    Date of Admission: 2021 11:41 AM  Date of Discharge:  10/19/2021  Primary Care Physician: Provider, No Known    Consults     Date and Time Order Name Status Description    2021  1:12 PM Inpatient Palliative Care MD Consult Completed           Hospital Course     Presenting Problem:   Pneumonia of both lower lobes due to infectious organism [J18.9]    Active Hospital Problems    Diagnosis  POA   • Pneumonia of both lower lobes due to infectious organism [J18.9]  Yes   • Chronic diastolic CHF (congestive heart failure) (HCC) [I50.32]  Unknown   • Hyperlipidemia [E78.5]  Yes   • Mental disorder [F99]  Yes   • History of seizure disorder [Z86.69]  Not Applicable   • Cardiomegaly [I51.7]  Yes      Resolved Hospital Problems   No resolved problems to display.          Hospital Course:  Rao Alcazar is a 76 y.o. male patient with severe intellectual disability, nonverbal at baseline ([facility]ard of [facility]), with history of dyslipidemia, seizure disorder and chronic hypoxic respiratory failure on home oxygen at 2 L/min presented to the hospital with fever and cough and was found to have bilateral basal pneumonia. Ultimately patient was discharged to facility with hospice 10/19/21     This patient's problems and plans were partially entered by my partner and updated as appropriate by me 10/18/21.     GOC  -Pt DNR/DNI. He is on a comfort diet. Plan is LTC with hospice.     Aspiration pneumonia  Dysphagia   -CT chest showed bilateral pulmonary infiltrates and pleural effusion, concerning for community acquired pneumonia  -Has completed course of IV abx.  -Continue comfort diet. Patient with good appetite, eating 100% of his meals.      Seizure disorder  -Continue Keppra and Dilantin  -Seizure precautions     Diastolic heart failure  -echo EF 60%, aortic stenosis, normal RVSP   -Compensated. Continue aspirin     Hyperlipidemia  -Continue statin     Severe intellectual disability    --Stable at baseline         Discharge Follow Up Recommendations for outpatient labs/diagnostics:  Per hospice    Day of Discharge     HPI:   Resting in bed. Speech unintelligible.     Review of Systems  uto    Vital Signs:   Temp:  [98 °F (36.7 °C)] 98 °F (36.7 °C)  Heart Rate:  [81] 81  Resp:  [20] 20  BP: (155)/(74) 155/74     Physical Exam:  GEN: NAD, resting in bed, awake  HEENT: on room air, atraumatic, normocephalic  NECK: supple, no masses  RESP: on room air, normal effort  CV: on tele, sinus rhythm  PSYCH: normal affect, appropriate  NEURO: awake, alert, no focal deficits noted  MSK: no edema noted  SKIN: no rashes noted       Pertinent  and/or Most Recent Results     LAB RESULTS:                              Brief Urine Lab Results  (Last result in the past 365 days)      Color   Clarity   Blood   Leuk Est   Nitrite   Protein   CREAT   Urine HCG        09/24/21 1203 Yellow   Clear   Negative   Negative   Negative   Negative               Microbiology Results (last 10 days)     Procedure Component Value - Date/Time    COVID PRE-OP / PRE-PROCEDURE SCREENING ORDER (NO ISOLATION) - Swab, Nasopharynx [[id number]]  (Normal) Collected: 10/18/21 1543    Lab Status: Final result Specimen: Swab from Nasopharynx Updated: 10/18/21 1704    Narrative:      The following orders were created for panel order COVID PRE-OP / PRE-PROCEDURE SCREENING ORDER (NO ISOLATION) - Swab, Nasopharynx.  Procedure                               Abnormality         Status                     ---------                               -----------         ------                     COVID-19,CEPHEID/EARNEST,LE...[[id number]]  Normal              Final result                 Please view results for these tests on the individual orders.    COVID-19,CEPHEID/EARNEST,MASON IN-HOUSE(OR EMERGENT/ADD-ON),NP SWAB IN TRANSPORT MEDIA 3-4 HR TAT - Swab, Nasopharynx [[id number]]  (Normal) Collected: 10/18/21 2223    Lab Status: Final result Specimen: Swab from  Nasopharynx Updated: 10/18/21 1704     COVID19 Not Detected    Narrative:      Fact sheet for providers: https://www.fda.gov/media/906765/download     Fact sheet for patients: https://www.fda.gov/media/319156/download  Fact sheet for providers: https://www.fda.gov/media/638330/download     Fact sheet for patients: https://www.fda.gov/media/582759/download          No radiology results for the last 10 days            Results for orders placed during the hospital encounter of 09/24/21    Adult Transthoracic Echo Complete W/ Cont if Necessary Per Protocol    Interpretation Summary  · Normal left ventricular systolic function, estimated EF 60%.  · Aortic sclerosis without aortic stenosis.  · Trace aortic insufficiency.  · Trace mitral regurgitation, trace tricuspid regurgitation, normal RVSP.      Plan for Follow-up of Pending Labs/Results: na    Discharge Details        Discharge Medications      New Medications      Instructions Start Date   PALLIATIVE CARE ORAL RINSE (MASON)   90 mL, Mouth/Throat, 3 Times Daily - RT      phenytoin 125 MG/5ML suspension  Commonly known as: DILANTIN   125 mg, Oral, 3 Times Daily         Continue These Medications      Instructions Start Date   aspirin 81 MG tablet   1 tablet, Oral, Daily      CALMOSEPTINE EX   Apply externally      guaiFENesin 600 MG 12 hr tablet  Commonly known as: MUCINEX   600 mg, Oral, 2 Times Daily      Keppra 1000 MG tablet  Generic drug: levETIRAcetam   2,000 mg, Oral, 2 Times Daily      MIRALAX PO   17 g, Oral, Daily      omeprazole 20 MG capsule  Commonly known as: priLOSEC   20 mg, Oral, Daily      Synthroid 25 MCG tablet  Generic drug: levothyroxine   1 tablet, Oral, Daily      Zocor 20 MG tablet  Generic drug: simvastatin   1 tablet, Oral, Daily         Stop These Medications    Calcium 600 + D 600-200 MG-UNIT tablet  Generic drug: Calcium Carb-Cholecalciferol     Flomax 0.4 MG capsule 24 hr capsule  Generic drug: tamsulosin     hydrOXYzine pamoate 25 MG  capsule  Commonly known as: VISTARIL     KlonoPIN 0.5 MG tablet  Generic drug: clonazePAM     montelukast 10 MG tablet  Commonly known as: SINGULAIR     Onfi 10 MG tablet  Generic drug: cloBAZam     phenytoin extended 200 MG ER capsule  Commonly known as: DILANTIN            No Known Allergies      Discharge Disposition:  Home or Self Care    Diet:  Hospital:  Diet Order   Procedures   • Diet Dysphagia; II - Pureed; Nectar / Syrup Thick; No Straws; Cardiac       Activity:  Per hopice    Restrictions or Other Recommendations:  Per hospice       CODE STATUS:    Code Status and Medical Interventions:   Ordered at: 10/05/21 1420     Code Status:    No CPR     Medical Interventions (Level of Support Prior to Arrest):    Comfort Measures       No future appointments.    Additional Instructions for the Follow-ups that You Need to Schedule     Discharge Follow-up with Specified Provider: hospice   As directed      To: hospice                     Sapphire Rose MD  10/19/21      Time Spent on Discharge:  I spent  25  minutes on this discharge activity which included: face-to-face encounter with the patient, reviewing the data in the system, coordination of the care with the nursing staff as well as consultants, documentation, and entering orders.            Electronically signed by Sapphire Rose MD at 10/19/21 7332

## 2021-10-19 NOTE — DISCHARGE SUMMARY
[facility] Medicine Services  DISCHARGE SUMMARY    Patient Name: Rao Alcazar  : 1945  MRN: [id number]    Date of Admission: 2021 11:41 AM  Date of Discharge:  10/19/2021  Primary Care Physician: Provider, No Known    Consults     Date and Time Order Name Status Description    2021  1:12 PM Inpatient Palliative Care MD Consult Completed           Hospital Course     Presenting Problem:   Pneumonia of both lower lobes due to infectious organism [J18.9]    Active Hospital Problems    Diagnosis  POA   • Pneumonia of both lower lobes due to infectious organism [J18.9]  Yes   • Chronic diastolic CHF (congestive heart failure) (HCC) [I50.32]  Unknown   • Hyperlipidemia [E78.5]  Yes   • Mental disorder [F99]  Yes   • History of seizure disorder [Z86.69]  Not Applicable   • Cardiomegaly [I51.7]  Yes      Resolved Hospital Problems   No resolved problems to display.          Hospital Course:  Rao Alcazar is a 76 y.o. male patient with severe intellectual disability, nonverbal at baseline (goddard of [facility]), with history of dyslipidemia, seizure disorder and chronic hypoxic respiratory failure on home oxygen at 2 L/min presented to the hospital with fever and cough and was found to have bilateral basal pneumonia. Ultimately patient was discharged to facility with hospice 10/19/21     This patient's problems and plans were partially entered by my partner and updated as appropriate by me 10/18/21.     GOC  -Pt DNR/DNI. He is on a comfort diet. Plan is LTC with hospice.     Aspiration pneumonia  Dysphagia   -CT chest showed bilateral pulmonary infiltrates and pleural effusion, concerning for community acquired pneumonia  -Has completed course of IV abx.  -Continue comfort diet. Patient with good appetite, eating 100% of his meals.      Seizure disorder  -Continue Keppra and Dilantin  -Seizure precautions     Diastolic heart failure  -echo EF 60%, aortic stenosis, normal RVSP    -Compensated. Continue aspirin     Hyperlipidemia  -Continue statin     Severe intellectual disability   --Stable at baseline         Discharge Follow Up Recommendations for outpatient labs/diagnostics:  Per hospice    Day of Discharge     HPI:   Resting in bed. Speech unintelligible.     Review of Systems  uto    Vital Signs:   Temp:  [98 °F (36.7 °C)] 98 °F (36.7 °C)  Heart Rate:  [81] 81  Resp:  [20] 20  BP: (155)/(74) 155/74     Physical Exam:  GEN: NAD, resting in bed, awake  HEENT: on room air, atraumatic, normocephalic  NECK: supple, no masses  RESP: on room air, normal effort  CV: on tele, sinus rhythm  PSYCH: normal affect, appropriate  NEURO: awake, alert, no focal deficits noted  MSK: no edema noted  SKIN: no rashes noted       Pertinent  and/or Most Recent Results     LAB RESULTS:                              Brief Urine Lab Results  (Last result in the past 365 days)      Color   Clarity   Blood   Leuk Est   Nitrite   Protein   CREAT   Urine HCG        09/24/21 1203 Yellow   Clear   Negative   Negative   Negative   Negative               Microbiology Results (last 10 days)     Procedure Component Value - Date/Time    COVID PRE-OP / PRE-PROCEDURE SCREENING ORDER (NO ISOLATION) - Swab, Nasopharynx [[id number]]  (Normal) Collected: 10/18/21 1543    Lab Status: Final result Specimen: Swab from Nasopharynx Updated: 10/18/21 1704    Narrative:      The following orders were created for panel order COVID PRE-OP / PRE-PROCEDURE SCREENING ORDER (NO ISOLATION) - Swab, Nasopharynx.  Procedure                               Abnormality         Status                     ---------                               -----------         ------                     COVID-19,CEPHEID/EARNEST,LE...[[id number]]  Normal              Final result                 Please view results for these tests on the individual orders.    COVID-19,CEPHEID/EARNEST,MASON IN-HOUSE(OR EMERGENT/ADD-ON),NP SWAB IN TRANSPORT MEDIA 3-4 HR TAT - Swab,  Nasopharynx [[id number]]  (Normal) Collected: 10/18/21 1543    Lab Status: Final result Specimen: Swab from Nasopharynx Updated: 10/18/21 1704     COVID19 Not Detected    Narrative:      Fact sheet for providers: https://www.fda.gov/media/485126/download     Fact sheet for patients: https://www.fda.gov/media/030357/download  Fact sheet for providers: https://www.fda.gov/media/894973/download     Fact sheet for patients: https://www.fda.gov/media/875819/download          No radiology results for the last 10 days            Results for orders placed during the hospital encounter of 09/24/21    Adult Transthoracic Echo Complete W/ Cont if Necessary Per Protocol    Interpretation Summary  · Normal left ventricular systolic function, estimated EF 60%.  · Aortic sclerosis without aortic stenosis.  · Trace aortic insufficiency.  · Trace mitral regurgitation, trace tricuspid regurgitation, normal RVSP.      Plan for Follow-up of Pending Labs/Results: na    Discharge Details        Discharge Medications      New Medications      Instructions Start Date   PALLIATIVE CARE ORAL RINSE (MASON)   90 mL, Mouth/Throat, 3 Times Daily - RT      phenytoin 125 MG/5ML suspension  Commonly known as: DILANTIN   125 mg, Oral, 3 Times Daily         Continue These Medications      Instructions Start Date   aspirin 81 MG tablet   1 tablet, Oral, Daily      CALMOSEPTINE EX   Apply externally      guaiFENesin 600 MG 12 hr tablet  Commonly known as: MUCINEX   600 mg, Oral, 2 Times Daily      Keppra 1000 MG tablet  Generic drug: levETIRAcetam   2,000 mg, Oral, 2 Times Daily      MIRALAX PO   17 g, Oral, Daily      omeprazole 20 MG capsule  Commonly known as: priLOSEC   20 mg, Oral, Daily      Synthroid 25 MCG tablet  Generic drug: levothyroxine   1 tablet, Oral, Daily      Zocor 20 MG tablet  Generic drug: simvastatin   1 tablet, Oral, Daily         Stop These Medications    Calcium 600 + D 600-200 MG-UNIT tablet  Generic drug: Calcium  Carb-Cholecalciferol     Flomax 0.4 MG capsule 24 hr capsule  Generic drug: tamsulosin     hydrOXYzine pamoate 25 MG capsule  Commonly known as: VISTARIL     KlonoPIN 0.5 MG tablet  Generic drug: clonazePAM     montelukast 10 MG tablet  Commonly known as: SINGULAIR     Onfi 10 MG tablet  Generic drug: cloBAZam     phenytoin extended 200 MG ER capsule  Commonly known as: DILANTIN            No Known Allergies      Discharge Disposition:  Home or Self Care    Diet:  Hospital:  Diet Order   Procedures   • Diet Dysphagia; II - Pureed; Nectar / Syrup Thick; No Straws; Cardiac       Activity:  Per hopice    Restrictions or Other Recommendations:  Per hospice       CODE STATUS:    Code Status and Medical Interventions:   Ordered at: 10/05/21 1420     Code Status:    No CPR     Medical Interventions (Level of Support Prior to Arrest):    Comfort Measures       No future appointments.    Additional Instructions for the Follow-ups that You Need to Schedule     Discharge Follow-up with Specified Provider: hospice   As directed      To: hospice                     Sapphire Rose MD  10/19/21      Time Spent on Discharge:  I spent  25  minutes on this discharge activity which included: face-to-face encounter with the patient, reviewing the data in the system, coordination of the care with the nursing staff as well as consultants, documentation, and entering orders.

## 2023-05-09 NOTE — SIGNIFICANT NOTE
Received approval Hospice with comfort measures from [facility] Department for Aging and Independent Living. SAY Red DO and QUITA Vázquez DO notified in addition to Hospice SW SAY Alcazar CSW and RODNEY Lebron CSW.  Will need to change code status to comfort measures, hospice consult for support at d/c.  Approval faxed to HIM for inclusion in EMR and copy placed in chartlet.  
home

## 2024-01-16 NOTE — PLAN OF CARE
A user error has taken place: encounter opened in error, closed for administrative reasons.     Goal Outcome Evaluation:            Pt turned q2h.Pt side rails padded. RN will continue to monitor pt